# Patient Record
Sex: FEMALE | Race: BLACK OR AFRICAN AMERICAN | NOT HISPANIC OR LATINO | Employment: UNEMPLOYED | URBAN - METROPOLITAN AREA
[De-identification: names, ages, dates, MRNs, and addresses within clinical notes are randomized per-mention and may not be internally consistent; named-entity substitution may affect disease eponyms.]

---

## 2017-01-22 ENCOUNTER — HOSPITAL ENCOUNTER (EMERGENCY)
Facility: HOSPITAL | Age: 37
Discharge: HOME | End: 2017-01-22
Attending: SURGERY
Payer: COMMERCIAL

## 2017-01-22 VITALS
WEIGHT: 195 LBS | DIASTOLIC BLOOD PRESSURE: 79 MMHG | BODY MASS INDEX: 32.45 KG/M2 | SYSTOLIC BLOOD PRESSURE: 132 MMHG | HEART RATE: 76 BPM | RESPIRATION RATE: 16 BRPM | TEMPERATURE: 98 F

## 2017-01-22 DIAGNOSIS — B86 SCABIES: Primary | ICD-10-CM

## 2017-01-22 PROCEDURE — 99283 EMERGENCY DEPT VISIT LOW MDM: CPT

## 2017-01-22 RX ORDER — PERMETHRIN 50 MG/G
CREAM TOPICAL
Qty: 60 G | Refills: 0 | Status: SHIPPED | OUTPATIENT
Start: 2017-01-22 | End: 2017-02-01

## 2017-01-23 NOTE — ED PROVIDER NOTES
"[facility]                                        January 22, 2017                     Chief Complaint  36 y.o. female with Rash (rash to hands)    History of Present Illness  Epifanio Wallace presents to the emergency room with a rash this week  Patient states her daughter brought of a rash after sleeping at friend's house  Patient on exam has scabies bites on the hands, extremities and trunk today  Patient has no signs of cellulitis, no signs of purulent drainage or abscess  Patient states the "rash" itches, motor and son had the exact same "rash"  Patient is afebrile and stable, stating "I think we all have bedbugs"     The history is provided by the patient    Past Medical History   -- Asthma    -- Chronic pelvic pain in female    -- Fibromyalgia    -- GERD (gastroesophageal reflux disease)    -- Osteoarthritis      Past Surgical History   -- Tubal ligation     -- Appendectomy     -- Co 2 laser     -- Pelvic laparoscopy     -- Tonsillectomy     -- Adenoidectomy     -- Colonoscopy        No Known Allergies     Review of Systems and Physical Exam     Review of Systems  -- Constitution - no fever, denies fatigue, no weakness, no chills  -- Eyes - no tearing or redness, no visual disturbance  -- Ear, Nose - no tinnitus or earache, no nasal congestion or discharge  -- Mouth,Throat - no sore throat, no toothache, normal voice, normal swallowing  -- Respiratory - denies cough and congestion, no shortness of breath, no PATEL  -- Cardiovascular - denies chest pain, no palpitations, denies claudication  -- Gastrointestinal - denies abdominal pain, nausea, vomiting, or diarrhea  -- Musculoskeletal - denies back pain, negative for myalgias and arthralgias   -- Neurological - no headache, denies weakness or seizure; no LOC  -- Skin - rash with 2 children, "we have bedbugs"    Vital Signs  -- Her blood pressure is 132/79 and her pulse is 76. Her respiration is 16.      Physical Exam  -- Nursing note and " vitals reviewed  -- Constitutional: Appears well-developed and well-nourished  -- Head: Atraumatic. Normocephalic. No obvious abnormality  -- Eyes: Pupils are equal and reactive to light. Normal conjunctiva and lids  -- Cardiac: Normal rate, regular rhythm and normal heart sounds  -- Pulmonary: Normal respiratory effort, breath sounds clear to auscultation  -- Abdominal: Soft, no tenderness. Normal bowel sounds. Normal liver edge  -- Musculoskeletal: Normal range of motion, no effusions. Joints stable   -- Neurological: No focal deficits. Showed good interaction with staff  -- Vascular: Posterior tibial, dorsalis pedis and radial pulses 2+ bilaterally    -- Lymphatics: No cervical or peripheral lymphadenopathy. No edema noted  -- Skin: Scabies bites on the trunk and extremities    Emergency Room Course     Diagnosis  -- The encounter diagnosis was Scabies.    Disposition and Plan  -- Disposition: home  -- Condition: stable  -- Follow-up: Patient to follow up with Alka Mccullough NP in 1-2 days.  -- I advised the patient that we have found no life threatening condition today  -- At this time, I believe the patient is clinically stable for discharge.   -- The patient acknowledges that close follow up with a MD is required   -- Patient agrees to comply with all instruction and direction given in the ER    This note is dictated on Dragon Natural Speaking word recognition program.  There are word recognition mistakes that are occasionally missed on review.                Antonino Erickson MD  01/22/17 4461

## 2017-01-23 NOTE — DISCHARGE INSTRUCTIONS
"  Scabies  Scabies is an infection of the skin caused by a tiny parasitic insect, or mite, which is too small to see directly. It can be seen under a microscope, but it is usually recognized only by the rash and symptoms it causes. This can make it difficult to diagnose since the signs and symptoms can be similar to other diseases.  The scabies mite tunnels under the skin creating a small burrow, where it deposits its eggs. These then lau and grow into adults. They then create new burrows over the next 1 to 2 weeks. The mites die in about 4 to 6 weeks.  Causes  Scabies is spread by direct skin contact. Casual, very brief contact is usually not enough. For this reason, it is more common in places with crowded living conditions such as households, institutions, schools, and nursing homes. Immunocompromised people are also at increased risk.  Symptoms   It usually takes 2 to 4 weeks to develop symptoms after you have been infected. Often, there are few "classic" signs of scabies, but there are things to look for. Remember, many things can cause the same symptoms, but are not scabies.  · It can start (or spread) anywhere but it most common on the hands, feet, armpits, thighs, abdomen, buttocks, and groin area.  · Itching usually starts in one spot, and then spreads.  · Itching can be worse at night or after a hot bath or shower.  · Redness  · Rash caused by an allergic reaction to the scabies saliva or feces  · Bumps or nodules  · [place], which look like fine lines a half an inch to a few inches long. Most often burrows are seen in the web spaces between the fingers, wrist creases, and hands and are not caused by scratching.  Preventing spread to others  Scabies is highly contagious. It is easily spread by close personal contact or by sharing bed linens or clothing used by an infected person.  It may take 4-6 weeks for symptoms to appear after being exposed. Everyone living in the house with an infected person, as " well as sexual partners of an infected person, should be treated at the same time. After the first treatment, you will no longer be contagious and you may return to work, school or .  Home care  Clothing care  · Machine wash in hot water all sheets, towels, pillowcases, underwear, pajamas and any other clothing recently worn. Use the hot cycle of a dryer or use a hot iron to sterilize.  · Items that are difficult to wash such as coats, jackets, blankets and spreads can be sealed in a plastic trash bag for four days. (The insects die after three days off the human body.)  Medical treatment  Medications used to treat scabies are called scabicides because they kill the scabies mites. Scabicides are only available with a doctor's prescription.  Here are some general instructions for use of these medications:  · Always follow instructions provided by the doctor and pharmacist as well as the printed instructions that come with the medication.  · Use the cream on your body when your skin is cool and dry, not after a hot shower or bath.  · Usually the cream is put on your whole body from the chin all the way down to the toes.  · Leave the cream or lotion on for the recommended amount of time. This is usually 8 to 12 hours.  · Do not leave cream or lotion on the skin longer than directed and do not use more than recommended.  · Clean clothes should be worn after the treatment.  · If you wash your hands after using the cream, you will need to reapply the cream to your hands.  · If you are breast-feeding, wash off your nipples before feeding, and then re-apply the cream after breastfeeding.  · For babies or infants, you can put mittens on their hands to prevent licking the cream or lotion, or scratching themselves because of the itching.  Precautions  · Do not use the medication around your eyes. If it gets in your eyes, wash them out thoroughly.  · Do not use the medication inside the nose, ear, mouth, vagina, the tip  of the penis, or on the eyebrows or eyelashes.  · Pregnant or breastfeeding women and children under 2 years of age should not use the medication until discussing it with your doctor. This won't prevent treatment, but you may be given additional instructions.  The most common causes of failed treatment are:   · Not following the directions correctly  · Not repeating the treatment when necessary  · Not cleaning the house and clothes  · Not having everyone in the house treated  Medications  Itching probably causes the most discomfort. Benadryl (diphenhydramine) is an antihistamine available at drug stores. Use the oral Benedryl, not the cream. Unless a prescription antihistamine was given, Benadryl may be used to reduce itching if large areas of skin are involved. Do not use Benadryl if you have glaucoma or if you are a man with trouble urinating due to an enlarged prostate.  If you were given antibiotics due to a bacterial infection, take them until they are finished. It is important to finish the antibiotics even if the wound looks better to make sure the infection has cleared.  Follow-up care  Follow up with your doctor or as directed if your symptoms do not improve after 1 week, or if new burrows or rashes appear.  When to seek medical care  Get prompt medical attention if any of the following occur:  · Increasing redness of the skin  · Yellow-brown crusts or drainage from the sores  · Other signs of infection, increasing redness, swelling, pain, or pus  · Fever of 100.4°F (38ºC) or higher, or as directed by your health care provider  © 8296-9486 [employer]. [address] [place] [address], [place] [address]. All rights reserved. This information is not intended as a substitute for professional medical care. Always follow your healthcare professional's instructions.

## 2017-04-17 ENCOUNTER — HOSPITAL ENCOUNTER (EMERGENCY)
Facility: HOSPITAL | Age: 37
Discharge: HOME | End: 2017-04-17
Attending: FAMILY MEDICINE
Payer: COMMERCIAL

## 2017-04-17 VITALS
RESPIRATION RATE: 18 BRPM | SYSTOLIC BLOOD PRESSURE: 146 MMHG | DIASTOLIC BLOOD PRESSURE: 73 MMHG | BODY MASS INDEX: 32.12 KG/M2 | WEIGHT: 196 LBS | TEMPERATURE: 99 F | HEART RATE: 89 BPM

## 2017-04-17 DIAGNOSIS — R10.31 CHRONIC GROIN PAIN, RIGHT: Primary | ICD-10-CM

## 2017-04-17 DIAGNOSIS — G89.29 CHRONIC GROIN PAIN, RIGHT: Primary | ICD-10-CM

## 2017-04-17 PROCEDURE — 96372 THER/PROPH/DIAG INJ SC/IM: CPT

## 2017-04-17 PROCEDURE — 63600175 PHARM REV CODE 636 W HCPCS: Performed by: FAMILY MEDICINE

## 2017-04-17 PROCEDURE — 99283 EMERGENCY DEPT VISIT LOW MDM: CPT

## 2017-04-17 RX ORDER — KETOROLAC TROMETHAMINE 30 MG/ML
60 INJECTION, SOLUTION INTRAMUSCULAR; INTRAVENOUS
Status: COMPLETED | OUTPATIENT
Start: 2017-04-17 | End: 2017-04-17

## 2017-04-17 RX ORDER — DICLOFENAC SODIUM 75 MG/1
75 TABLET, DELAYED RELEASE ORAL 2 TIMES DAILY
Qty: 20 TABLET | Refills: 1 | Status: SHIPPED | OUTPATIENT
Start: 2017-04-17 | End: 2017-08-27

## 2017-04-17 RX ADMIN — KETOROLAC TROMETHAMINE 60 MG: 30 INJECTION, SOLUTION INTRAMUSCULAR at 05:04

## 2017-04-17 NOTE — ED PROVIDER NOTES
Encounter Date: 4/17/2017       History     Chief Complaint   Patient presents with    Groin Pain     Review of patient's allergies indicates:  No Known Allergies  Patient is a 36 y.o. female presenting with the following complaint: general illness. The history is provided by the patient. No  was used.   General Illness    The current episode started several weeks ago. The problem has been unchanged. The pain is at a severity of 5/10. Nothing relieves the symptoms. Nothing aggravates the symptoms. Pertinent negatives include no fever, no diarrhea, no nausea, no vomiting, no muscle aches, no cough, no shortness of breath and no rash. She has received no recent medical care.     Patient complains of pain in the right groin and right SI joint off and on for the past 3 years.  She's had workup including lab work and MRI with no diagnosis.  This time it became slightly worse and she came to the ED.  No fever chills nausea or vomiting.  The pain is sharp in nature.  Standing makes it worse any movement makes it worse.    Past Medical History:   Diagnosis Date    Asthma     Chronic pelvic pain in female     Fibromyalgia     GERD (gastroesophageal reflux disease)     Osteoarthritis      Past Surgical History:   Procedure Laterality Date    ADENOIDECTOMY      APPENDECTOMY      laparoscopic    co 2 laser      to cervix about 5 years ago    COLONOSCOPY N/A 12/21/2016    Procedure: COLONOSCOPY;  Surgeon: Aylin García MD;  [facility];  Service: Endoscopy;  Laterality: N/A;    PELVIC LAPAROSCOPY  8/11/14    normal    TONSILLECTOMY      TUBAL LIGATION      UPPER GASTROINTESTINAL ENDOSCOPY       Family History   Problem Relation Age of Onset    Lupus Mother     Hypertension Father      Social History   Substance Use Topics    Smoking status: Never Smoker    Smokeless tobacco: Never Used    Alcohol use 0.0 oz/week     0 Standard drinks or equivalent per week      Comment:  occasionally      Review of Systems   Constitutional: Negative for fever.   Respiratory: Negative for cough and shortness of breath.    Gastrointestinal: Negative for diarrhea, nausea and vomiting.   Skin: Negative for rash.       Physical Exam   Initial Vitals   BP Pulse Resp Temp SpO2   04/17/17 1657 04/17/17 1657 04/17/17 1657 04/17/17 1657 --   146/73 89 18 98.5 °F (36.9 °C)      Physical Exam    Nursing note and vitals reviewed.  Constitutional: She appears well-developed and well-nourished.   HENT:   Head: Normocephalic and atraumatic.   Eyes: Conjunctivae and EOM are normal. Pupils are equal, round, and reactive to light.   Cardiovascular: Normal rate and regular rhythm.   Pulmonary/Chest: Breath sounds normal.   Abdominal: Soft. Bowel sounds are normal.       Musculoskeletal:        Back:    Neurological: She is alert and oriented to person, place, and time.   Psychiatric: She has a normal mood and affect.         ED Course   Procedures  Labs Reviewed - No data to display                            ED Course     Clinical Impression:   The encounter diagnosis was Chronic groin pain, right.          Sreekanth Cain MD  04/17/17 8278

## 2017-04-17 NOTE — ED AVS SNAPSHOT
[facility] [address] [place] [address]               Epifanio Wallace   2017  4:57 PM   ED    Description:  Female : 1980   Department:  [facility]           Your Care was Coordinated By:     Provider Role From To    Sreekanth Cain MD Attending Provider 17 1360 --      Reason for Visit     Groin Pain           Diagnoses this Visit        Comments    Chronic groin pain, right    -  Primary       ED Disposition     ED Disposition Condition Comment    Discharge             To Do List           Follow-up Information     Follow up with Alka Mccullough NP. Schedule an appointment as soon as possible for a visit in 2 days.    Specialty:  Family Medicine    Why:  If symptoms worsen    Contact information:     [address]  [place] [address]  [phone]         These Medications        Disp Refills Start End    diclofenac (VOLTAREN) 75 MG EC tablet 20 tablet 1 2017     Take 1 tablet (75 mg total) by mouth 2 (two) times daily. - Oral    Ph[facility]acy: [facility] [address] Ph #: [phone]         [facility]     [facility] Nurse Care Line -  Assistance  Unless otherwise directed by your provider, please contact [facility], our nurse care line that is available for  assistance.     Registered nurses in the [facility] provide: appointment scheduling, clinical advisement, health education, and other advisory services.  Call: [phone] (toll free)               Medications           Message regarding Medications     Verify the changes and/or additions to your medication regime listed below are the same as discussed with your clinician today.  If any of these changes or additions are incorrect, please notify your healthcare provider.        START taking these NEW medications        Refills    diclofenac (VOLTAREN) 75 MG EC tablet 1    Sig:  Take 1 tablet (75 mg total) by mouth 2 (two) times daily.    Class: Normal    Route: Oral      These medications were administered today        Dose Freq    ketorolac injection 60 mg 60 mg ED 1 Time    Sig: Inject 2 mLs (60 mg total) into the muscle ED 1 Time.    Class: Normal    Route: Intramuscular           Verify that the below list of medications is an accurate representation of the medications you are currently taking.  If none reported, the list may be blank. If incorrect, please contact your healthcare provider. Carry this list with you in case of emergency.           Current Medications     albuterol (ACCUNEB) 0.63 mg/3 mL Nebu Take 3 mLs (0.63 mg total) by nebulization every 6 (six) hours as needed.    albuterol 90 mcg/actuation inhaler Inhale 2 puffs into the lungs every 6 (six) hours as needed for Wheezing.    desonide (DESOWEN) 0.05 % lotion Apply topically 2 (two) times daily.    diclofenac (VOLTAREN) 75 MG EC tablet Take 1 tablet (75 mg total) by mouth 2 (two) times daily.    furosemide (LASIX) 20 MG tablet Take 1 tablet (20 mg total) by mouth daily as needed.    hydrocodone-acetaminophen 5-325mg (NORCO) 5-325 mg per tablet Take 1 tablet by mouth every 4 to 6 hours as needed.    linaclotide 290 mcg Cap Take 1 capsule (290 mcg total) by mouth once daily.    pantoprazole (PROTONIX) 40 MG tablet Take 1 tablet (40 mg total) by mouth once daily.    potassium chloride SA (K-DUR,KLOR-CON) 20 MEQ tablet Take 1 tablet (20 mEq total) by mouth once daily.    promethazine (PHENERGAN) 25 MG tablet Take 1 tablet (25 mg total) by mouth every 6 (six) hours as needed for Nausea.           Clinical Reference Information           Your Vitals Were     BP Pulse Temp Resp Weight Last Period    146/73 89 98.5 °F (36.9 °C) 18 88.9 kg (196 lb) 04/16/2017 (Approximate)    BMI                32.12 kg/m2          Allergies as of 4/17/2017     No Known Allergies      Immunizations Administered on Date of Encounter - 4/17/2017      None      ED Micro, Lab, POCT     None      ED Imaging Orders     None       [facility] complies with applicable Federal civil rights laws and does not discriminate on the basis of race, color, national origin, age, disability, or sex.        Language Assistance Services     ATTENTION: Language assistance services are available, free of charge. Please call [phone].      ATENCIÓN: Si habla español, tiene a gonzalez disposición servicios gratuitos de asistencia lingüística. Llame al [phone].     CHÚ Ý: N?u b?n nói Ti?ng Vi?t, có các d?ch v? h? tr? ngôn ng? mi?n phí dành cho b?n. G?i s? [phone].

## 2017-08-27 ENCOUNTER — HOSPITAL ENCOUNTER (EMERGENCY)
Facility: HOSPITAL | Age: 37
Discharge: HOME | End: 2017-08-27
Attending: EMERGENCY MEDICINE
Payer: COMMERCIAL

## 2017-08-27 VITALS
DIASTOLIC BLOOD PRESSURE: 70 MMHG | SYSTOLIC BLOOD PRESSURE: 135 MMHG | RESPIRATION RATE: 15 BRPM | TEMPERATURE: 98 F | BODY MASS INDEX: 29.83 KG/M2 | WEIGHT: 182 LBS | HEART RATE: 75 BPM | OXYGEN SATURATION: 98 %

## 2017-08-27 DIAGNOSIS — N39.0 URINARY TRACT INFECTION WITHOUT HEMATURIA, SITE UNSPECIFIED: Primary | ICD-10-CM

## 2017-08-27 LAB
B-HCG UR QL: NEGATIVE
BACTERIA #/AREA URNS HPF: ABNORMAL /HPF
BILIRUB UR QL STRIP: NEGATIVE
CLARITY UR: CLEAR
COLOR UR: YELLOW
GLUCOSE UR QL STRIP: NEGATIVE
HGB UR QL STRIP: NEGATIVE
KETONES UR QL STRIP: NEGATIVE
LEUKOCYTE ESTERASE UR QL STRIP: ABNORMAL
MICROSCOPIC COMMENT: ABNORMAL
NITRITE UR QL STRIP: NEGATIVE
PH UR STRIP: 7 [PH] (ref 5–8)
PROT UR QL STRIP: NEGATIVE
RBC #/AREA URNS HPF: 1 /HPF (ref 0–4)
SP GR UR STRIP: 1.02 (ref 1–1.03)
SQUAMOUS #/AREA URNS HPF: 10 /HPF
URN SPEC COLLECT METH UR: ABNORMAL
UROBILINOGEN UR STRIP-ACNC: 1 EU/DL
WBC #/AREA URNS HPF: 20 /HPF (ref 0–5)

## 2017-08-27 PROCEDURE — 81000 URINALYSIS NONAUTO W/SCOPE: CPT

## 2017-08-27 PROCEDURE — 81025 URINE PREGNANCY TEST: CPT

## 2017-08-27 PROCEDURE — 63600175 PHARM REV CODE 636 W HCPCS: Performed by: EMERGENCY MEDICINE

## 2017-08-27 PROCEDURE — 99283 EMERGENCY DEPT VISIT LOW MDM: CPT | Mod: 25

## 2017-08-27 PROCEDURE — 96372 THER/PROPH/DIAG INJ SC/IM: CPT

## 2017-08-27 PROCEDURE — 25000003 PHARM REV CODE 250: Performed by: EMERGENCY MEDICINE

## 2017-08-27 RX ORDER — KETOROLAC TROMETHAMINE 30 MG/ML
60 INJECTION, SOLUTION INTRAMUSCULAR; INTRAVENOUS
Status: COMPLETED | OUTPATIENT
Start: 2017-08-27 | End: 2017-08-27

## 2017-08-27 RX ORDER — KETOROLAC TROMETHAMINE 10 MG/1
10 TABLET, FILM COATED ORAL EVERY 6 HOURS
Qty: 20 TABLET | Refills: 0 | Status: SHIPPED | OUTPATIENT
Start: 2017-08-27 | End: 2017-09-06

## 2017-08-27 RX ORDER — SULFAMETHOXAZOLE AND TRIMETHOPRIM 800; 160 MG/1; MG/1
1 TABLET ORAL 2 TIMES DAILY
Qty: 14 TABLET | Refills: 0 | Status: SHIPPED | OUTPATIENT
Start: 2017-08-27 | End: 2017-09-03

## 2017-08-27 RX ORDER — SULFAMETHOXAZOLE AND TRIMETHOPRIM 800; 160 MG/1; MG/1
1 TABLET ORAL
Status: COMPLETED | OUTPATIENT
Start: 2017-08-27 | End: 2017-08-27

## 2017-08-27 RX ADMIN — SULFAMETHOXAZOLE AND TRIMETHOPRIM 1 TABLET: 800; 160 TABLET ORAL at 09:08

## 2017-08-27 RX ADMIN — KETOROLAC TROMETHAMINE 60 MG: 30 INJECTION, SOLUTION INTRAMUSCULAR at 09:08

## 2017-08-28 NOTE — ED PROVIDER NOTES
[facility]                                                  Chief Complaint  36 y.o. female with Muscle Pain (left sided rib pain x 4 days)    History of Present Illness  Epifanio Wallace presents to the emergency room with several day history of left-sided rib pain.  Patient denies urinary symptoms.  She denies pregnancy.  She denies trauma or fall.  She has no fever or difficulty breathing.      Past Medical History:   Diagnosis Date    Asthma     Chronic pelvic pain in female     Delayed gastric emptying     Fibromyalgia     GERD (gastroesophageal reflux disease)     Osteoarthritis      Past Surgical History:   Procedure Laterality Date    ADENOIDECTOMY      APPENDECTOMY      laparoscopic    co 2 laser      to cervix about 5 years ago    COLONOSCOPY N/A 12/21/2016    Procedure: COLONOSCOPY;  Surgeon: Aylin García MD;  [facility];  Service: Endoscopy;  Laterality: N/A;    PELVIC LAPAROSCOPY  8/11/14    normal    TONSILLECTOMY      TUBAL LIGATION      UPPER GASTROINTESTINAL ENDOSCOPY        Review of patient's allergies indicates:  No Known Allergies     Review of Systems and Physical Exam     Review of Systems  -- Constitution - no fever, denies fatigue, no weakness, no chills  -- Eyes - no tearing or redness, no visual disturbance  -- Ear, Nose - no tinnitus or earache, no nasal congestion or discharge  -- Mouth,Throat - no sore throat, no toothache, normal voice, normal swallowing  -- Respiratory - denies cough and congestion, no shortness of breath, no wheezing  -- Cardiovascular - denies chest pain, no palpitations, denies claudication  -- Gastrointestinal - denies abdominal pain, nausea, vomiting, or diarrhea  -- Genitourinary - no dysuria, no denies flank pain, no hematuria or frequency   -- Musculoskeletal - denies back pain, negative for myalgias and arthralgias  reports left-sided rib pain  -- Neurological - no headache, denies weakness or seizure;  no LOC  -- Skin - denies pallor, rash, or changes in skin. no hives or welts noted    Vital Signs   weight is 82.6 kg (182 lb). Her oral temperature is 98.3 °F (36.8 °C). Her blood pressure is 137/74 and her pulse is 77. Her respiration is 16 and oxygen saturation is 98%.      Physical Exam  -- Nursing note and vitals reviewed  -- Constitutional: Appears well-developed and well-nourished  -- Head: Atraumatic. Normocephalic. No obvious abnormality  -- Eyes: Pupils are equal and reactive to light. Normal conjunctiva and lids  -- Nose: Nose normal in appearance, nares grossly normal. No discharge  -- Throat: Mucous membranes moist, pharynx normal, normal tonsils. No lesions   -- Ears: External ears and TM normal bilaterally. Normal hearing and no drainage  -- Neck: Normal range of motion. Neck supple. No masses, trachea midline  -- Cardiac: Normal rate, regular rhythm and normal heart sounds  -- Pulmonary: Normal respiratory effort, breath sounds clear to auscultation  -- Abdominal: Soft, no tenderness. Normal bowel sounds. Normal liver edge  -- Musculoskeletal: Normal range of motion, no effusions. Joints stable left-sided ribs and soft tissue diffusely tender to palpation over multiple areas  -- Neurological: No focal deficits. Showed good interaction with staff  -- Vascular: Posterior tibial, dorsalis pedis and radial pulses 2+ bilaterally    -- Lymphatics: No cervical or peripheral lymphadenopathy. No edema noted  -- Skin: Warm and dry. No evidence of rash or cellulitis  -- Psychiatric: Normal mood and affect. Bedside behavior is appropriate    Emergency Room Course     Treatment and Evaluation  1.  Physical exam consistent with musculoskeletal pain  2.  Her pregnancy test negative  3.  Urinalysis positive for leukocytes and bacteria  4.  Toradol 60 IM  5.  Bactrim DS one tablet  7.  DC home follow up PCP    Abnormal lab values  Labs Reviewed   URINALYSIS   PREGNANCY TEST, URINE RAPID             Diagnosis  --  UTI    Disposition and Plan  -- Disposition: home  -- Condition: stable  -- Follow-up: Patient to follow up with Alka Mccullough NP in 1-2 days.  -- I advised the patient that we have found no life threatening condition today  -- At this time, I believe the patient is clinically stable for discharge.   -- The patient acknowledges that close follow up with a MD is required   -- Patient agrees to comply with all instruction and direction given in the ER  -- Patient counseled on strict return precautions as discussed       Michelle Sanabria MD  08/27/17 1595

## 2017-09-04 ENCOUNTER — HOSPITAL ENCOUNTER (EMERGENCY)
Facility: HOSPITAL | Age: 37
Discharge: HOME | End: 2017-09-04
Attending: FAMILY MEDICINE
Payer: COMMERCIAL

## 2017-09-04 VITALS
SYSTOLIC BLOOD PRESSURE: 120 MMHG | BODY MASS INDEX: 29.83 KG/M2 | DIASTOLIC BLOOD PRESSURE: 70 MMHG | WEIGHT: 182 LBS | RESPIRATION RATE: 17 BRPM | TEMPERATURE: 98 F | HEART RATE: 80 BPM

## 2017-09-04 DIAGNOSIS — R10.9 LEFT FLANK PAIN: ICD-10-CM

## 2017-09-04 DIAGNOSIS — R10.9 CHRONIC ABDOMINAL PAIN: Primary | ICD-10-CM

## 2017-09-04 DIAGNOSIS — G89.29 CHRONIC ABDOMINAL PAIN: Primary | ICD-10-CM

## 2017-09-04 LAB
ALBUMIN SERPL BCP-MCNC: 3.3 G/DL
ALP SERPL-CCNC: 67 U/L
ALT SERPL W/O P-5'-P-CCNC: 13 U/L
AMORPH CRY URNS QL MICRO: ABNORMAL
ANION GAP SERPL CALC-SCNC: 7 MMOL/L
AST SERPL-CCNC: 12 U/L
B-HCG UR QL: NEGATIVE
BACTERIA #/AREA URNS HPF: ABNORMAL /HPF
BASOPHILS # BLD AUTO: 0.02 K/UL
BASOPHILS NFR BLD: 0.2 %
BILIRUB SERPL-MCNC: 0.3 MG/DL
BILIRUB UR QL STRIP: NEGATIVE
BUN SERPL-MCNC: 10 MG/DL
CALCIUM SERPL-MCNC: 8.8 MG/DL
CHLORIDE SERPL-SCNC: 110 MMOL/L
CLARITY UR: ABNORMAL
CO2 SERPL-SCNC: 24 MMOL/L
COLOR UR: YELLOW
CREAT SERPL-MCNC: 0.7 MG/DL
DIFFERENTIAL METHOD: ABNORMAL
EOSINOPHIL # BLD AUTO: 0.5 K/UL
EOSINOPHIL NFR BLD: 5.7 %
ERYTHROCYTE [DISTWIDTH] IN BLOOD BY AUTOMATED COUNT: 18.2 %
EST. GFR  (AFRICAN AMERICAN): >60 ML/MIN/1.73 M^2
EST. GFR  (NON AFRICAN AMERICAN): >60 ML/MIN/1.73 M^2
GLUCOSE SERPL-MCNC: 93 MG/DL
GLUCOSE UR QL STRIP: NEGATIVE
HCT VFR BLD AUTO: 34.8 %
HGB BLD-MCNC: 11.2 G/DL
HGB UR QL STRIP: ABNORMAL
KETONES UR QL STRIP: NEGATIVE
LEUKOCYTE ESTERASE UR QL STRIP: NEGATIVE
LYMPHOCYTES # BLD AUTO: 2.8 K/UL
LYMPHOCYTES NFR BLD: 34.6 %
MCH RBC QN AUTO: 25.1 PG
MCHC RBC AUTO-ENTMCNC: 32.2 G/DL
MCV RBC AUTO: 78 FL
MICROSCOPIC COMMENT: ABNORMAL
MONOCYTES # BLD AUTO: 0.7 K/UL
MONOCYTES NFR BLD: 8.7 %
NEUTROPHILS # BLD AUTO: 4.1 K/UL
NEUTROPHILS NFR BLD: 50.8 %
NITRITE UR QL STRIP: NEGATIVE
PH UR STRIP: 6 [PH] (ref 5–8)
PLATELET # BLD AUTO: 378 K/UL
PMV BLD AUTO: 10.1 FL
POTASSIUM SERPL-SCNC: 3.6 MMOL/L
PROT SERPL-MCNC: 7.7 G/DL
PROT UR QL STRIP: NEGATIVE
RBC # BLD AUTO: 4.47 M/UL
RBC #/AREA URNS HPF: >100 /HPF (ref 0–4)
SODIUM SERPL-SCNC: 141 MMOL/L
SP GR UR STRIP: 1.01 (ref 1–1.03)
SQUAMOUS #/AREA URNS HPF: 1 /HPF
URN SPEC COLLECT METH UR: ABNORMAL
UROBILINOGEN UR STRIP-ACNC: 1 EU/DL
WBC # BLD AUTO: 8.01 K/UL
WBC #/AREA URNS HPF: 1 /HPF (ref 0–5)

## 2017-09-04 PROCEDURE — 25500020 PHARM REV CODE 255: Performed by: FAMILY MEDICINE

## 2017-09-04 PROCEDURE — 99284 EMERGENCY DEPT VISIT MOD MDM: CPT | Mod: 25

## 2017-09-04 PROCEDURE — 81025 URINE PREGNANCY TEST: CPT

## 2017-09-04 PROCEDURE — 80053 COMPREHEN METABOLIC PANEL: CPT

## 2017-09-04 PROCEDURE — 63600175 PHARM REV CODE 636 W HCPCS: Performed by: FAMILY MEDICINE

## 2017-09-04 PROCEDURE — 96374 THER/PROPH/DIAG INJ IV PUSH: CPT

## 2017-09-04 PROCEDURE — 25000003 PHARM REV CODE 250: Performed by: FAMILY MEDICINE

## 2017-09-04 PROCEDURE — 85025 COMPLETE CBC W/AUTO DIFF WBC: CPT

## 2017-09-04 PROCEDURE — 36415 COLL VENOUS BLD VENIPUNCTURE: CPT

## 2017-09-04 PROCEDURE — 81000 URINALYSIS NONAUTO W/SCOPE: CPT

## 2017-09-04 RX ORDER — DICYCLOMINE HYDROCHLORIDE 20 MG/1
20 TABLET ORAL 4 TIMES DAILY PRN
Qty: 15 TABLET | Refills: 0 | Status: SHIPPED | OUTPATIENT
Start: 2017-09-04 | End: 2017-10-04

## 2017-09-04 RX ORDER — ONDANSETRON 4 MG/1
4 TABLET, ORALLY DISINTEGRATING ORAL EVERY 8 HOURS PRN
Qty: 20 TABLET | Refills: 0 | OUTPATIENT
Start: 2017-09-04 | End: 2018-11-14

## 2017-09-04 RX ORDER — KETOROLAC TROMETHAMINE 30 MG/ML
30 INJECTION, SOLUTION INTRAMUSCULAR; INTRAVENOUS
Status: COMPLETED | OUTPATIENT
Start: 2017-09-04 | End: 2017-09-04

## 2017-09-04 RX ORDER — ONDANSETRON 4 MG/1
4 TABLET, ORALLY DISINTEGRATING ORAL
Status: COMPLETED | OUTPATIENT
Start: 2017-09-04 | End: 2017-09-04

## 2017-09-04 RX ADMIN — ONDANSETRON 4 MG: 4 TABLET, ORALLY DISINTEGRATING ORAL at 05:09

## 2017-09-04 RX ADMIN — IOHEXOL 30 ML: 350 INJECTION, SOLUTION INTRAVENOUS at 03:09

## 2017-09-04 RX ADMIN — KETOROLAC TROMETHAMINE 30 MG: 30 INJECTION, SOLUTION INTRAMUSCULAR at 03:09

## 2017-09-04 RX ADMIN — IOHEXOL 75 ML: 350 INJECTION, SOLUTION INTRAVENOUS at 05:09

## 2017-09-04 NOTE — ED PROVIDER NOTES
Encounter Date: 9/4/2017       History     Chief Complaint   Patient presents with    Flank Pain     The history is provided by the patient. No  was used.   Abdominal Pain   The current episode started several weeks ago. The onset of the illness was gradual. The problem has been gradually worsening. The abdominal pain is located in the left flank. The abdominal pain does not radiate. The severity of the abdominal pain is 7/10. The abdominal pain is relieved by nothing. The abdominal pain is exacerbated by movement and certain positions. The other symptoms of the illness include vomiting and vaginal bleeding. The other symptoms of the illness do not include fever, fatigue, jaundice, melena, nausea, diarrhea, dysuria, hematemesis, hematochezia or vaginal discharge.   Vaginal bleeding was first noticed more than 2 days ago.   The vomiting began today. Vomiting occurs 2 to 5 times per day. The emesis contains stomach contents.   The patient states that she believes she is currently not pregnant. The patient has not had a change in bowel habit. Symptoms associated with the illness do not include chills, anorexia, diaphoresis, heartburn, constipation, urgency, hematuria, frequency or back pain.     Patient reports 3 weeks of intermittent left flank pain.  Pain is burning and sharp in nature.  It is gotten worse in the last 2 or 3 days.  Patient was seen approximately 10 days ago given antibiotics for urinary tract infection which did not improve the symptoms.  No fever or chills.  Nausea but vomiting ×4 today.  No diarrhea.  Only medication she reports is Tylenol or Aleve and Motrin  Patient is a history of chronic pain issues.  Patient is currently having her menstrual cycle at this time.    Review of patient's allergies indicates:  No Known Allergies  Past Medical History:   Diagnosis Date    Asthma     Chronic pelvic pain in female     Delayed gastric emptying     Fibromyalgia     GERD  (gastroesophageal reflux disease)     Osteoarthritis      Past Surgical History:   Procedure Laterality Date    ADENOIDECTOMY      APPENDECTOMY      laparoscopic    co 2 laser      to cervix about 5 years ago    COLONOSCOPY N/A 12/21/2016    Procedure: COLONOSCOPY;  Surgeon: Aylin García MD;  Location: [facility];  Service: Endoscopy;  Laterality: N/A;    PELVIC LAPAROSCOPY  8/11/14    normal    TONSILLECTOMY      TUBAL LIGATION      UPPER GASTROINTESTINAL ENDOSCOPY       Family History   Problem Relation Age of Onset    Lupus Mother     Hypertension Father      Social History   Substance Use Topics    Smoking status: Never Smoker    Smokeless tobacco: Never Used    Alcohol use 0.0 oz/week      Comment: occasionally      Review of Systems   Constitutional: Negative for chills, diaphoresis, fatigue and fever.   Gastrointestinal: Positive for abdominal pain and vomiting. Negative for anorexia, constipation, diarrhea, heartburn, hematemesis, hematochezia, jaundice, melena and nausea.   Genitourinary: Positive for vaginal bleeding. Negative for dysuria, frequency, hematuria, urgency and vaginal discharge.   Musculoskeletal: Negative for back pain.       Physical Exam     Initial Vitals [09/04/17 0241]   BP Pulse Resp Temp SpO2   119/72 84 17 97.6 °F (36.4 °C) --      MAP       87.67         Physical Exam    Nursing note and vitals reviewed.  Constitutional: She appears well-developed and well-nourished.   Overweight female in no acute distress.   HENT:   Head: Normocephalic and atraumatic.   Right Ear: External ear normal.   Left Ear: External ear normal.   Nose: Nose normal.   Mouth/Throat: Oropharynx is clear and moist.   Eyes: Conjunctivae and EOM are normal. Pupils are equal, round, and reactive to light.   Neck: Normal range of motion. Neck supple.   Cardiovascular: Normal rate, regular rhythm and normal heart sounds.   Pulmonary/Chest: Breath sounds normal.   Abdominal: Soft. Bowel sounds are  normal. She exhibits no distension and no mass. There is no hepatosplenomegaly. There is tenderness. There is no rebound, no guarding, no tenderness at McBurney's point and negative Gallegos's sign.       Musculoskeletal: Normal range of motion.   Neurological: She is alert and oriented to person, place, and time. She has normal strength.   Skin: Skin is warm and dry.   Psychiatric: She has a normal mood and affect.         ED Course   Procedures  Labs Reviewed   CBC W/ AUTO DIFFERENTIAL - Abnormal; Notable for the following:        Result Value    Hemoglobin 11.2 (*)     Hematocrit 34.8 (*)     MCV 78 (*)     MCH 25.1 (*)     RDW 18.2 (*)     Platelets 378 (*)     All other components within normal limits   COMPREHENSIVE METABOLIC PANEL - Abnormal; Notable for the following:     Albumin 3.3 (*)     Anion Gap 7 (*)     All other components within normal limits   URINALYSIS - Abnormal; Notable for the following:     Appearance, UA Hazy (*)     Occult Blood UA 2+ (*)     All other components within normal limits   URINALYSIS MICROSCOPIC - Abnormal; Notable for the following:     RBC, UA >100 (*)     All other components within normal limits   PREGNANCY TEST, URINE RAPID                               ED Course      Clinical Impression:   The primary encounter diagnosis was Chronic abdominal pain. A diagnosis of Left flank pain was also pertinent to this visit.          I took over this patient from Dr. Cain at 6 AM shift change  Patient complains of left-sided abdominal pain last couple days  Normal white count and electrolytes, negative pregnancy test noted  Patient had a CT scan with no acute findings noted today in the ER  Review of patient's chart shows chronic abdominal pain issues   Patient has been to gastroenterology at [facility]; other workups  Patient is encouraged to follow up with her PCP this week                   Antonino Erickson MD  09/04/17 7611

## 2017-11-26 ENCOUNTER — HOSPITAL ENCOUNTER (EMERGENCY)
Facility: HOSPITAL | Age: 37
Discharge: HOME | End: 2017-11-26
Attending: SURGERY
Payer: COMMERCIAL

## 2017-11-26 VITALS
DIASTOLIC BLOOD PRESSURE: 92 MMHG | HEART RATE: 76 BPM | RESPIRATION RATE: 18 BRPM | TEMPERATURE: 97 F | SYSTOLIC BLOOD PRESSURE: 148 MMHG | WEIGHT: 193 LBS | BODY MASS INDEX: 31.63 KG/M2

## 2017-11-26 DIAGNOSIS — R10.9 LEFT FLANK PAIN: ICD-10-CM

## 2017-11-26 DIAGNOSIS — R10.9 CHRONIC FLANK PAIN: Primary | ICD-10-CM

## 2017-11-26 DIAGNOSIS — G89.29 CHRONIC FLANK PAIN: Primary | ICD-10-CM

## 2017-11-26 LAB
ALBUMIN SERPL BCP-MCNC: 3.4 G/DL
ALP SERPL-CCNC: 67 U/L
ALT SERPL W/O P-5'-P-CCNC: 12 U/L
AMPHET+METHAMPHET UR QL: NEGATIVE
ANION GAP SERPL CALC-SCNC: 6 MMOL/L
AST SERPL-CCNC: 12 U/L
B-HCG UR QL: NEGATIVE
BACTERIA #/AREA URNS HPF: NORMAL /HPF
BARBITURATES UR QL SCN>200 NG/ML: NEGATIVE
BASOPHILS # BLD AUTO: 0.02 K/UL
BASOPHILS NFR BLD: 0.3 %
BENZODIAZ UR QL SCN>200 NG/ML: NEGATIVE
BILIRUB SERPL-MCNC: 0.3 MG/DL
BILIRUB UR QL STRIP: NEGATIVE
BUN SERPL-MCNC: 8 MG/DL
BZE UR QL SCN: NEGATIVE
CALCIUM SERPL-MCNC: 8.9 MG/DL
CANNABINOIDS UR QL SCN: NEGATIVE
CHLORIDE SERPL-SCNC: 109 MMOL/L
CK MB SERPL-MCNC: 0.8 NG/ML
CK MB SERPL-RTO: 0.4 %
CK SERPL-CCNC: 200 U/L
CK SERPL-CCNC: 200 U/L
CLARITY UR: CLEAR
CO2 SERPL-SCNC: 25 MMOL/L
COLOR UR: YELLOW
CREAT SERPL-MCNC: 0.7 MG/DL
CREAT UR-MCNC: 151.3 MG/DL
DIFFERENTIAL METHOD: ABNORMAL
EOSINOPHIL # BLD AUTO: 0.5 K/UL
EOSINOPHIL NFR BLD: 8.1 %
ERYTHROCYTE [DISTWIDTH] IN BLOOD BY AUTOMATED COUNT: 18.1 %
EST. GFR  (AFRICAN AMERICAN): >60 ML/MIN/1.73 M^2
EST. GFR  (NON AFRICAN AMERICAN): >60 ML/MIN/1.73 M^2
GLUCOSE SERPL-MCNC: 89 MG/DL
GLUCOSE UR QL STRIP: NEGATIVE
HCT VFR BLD AUTO: 33.5 %
HGB BLD-MCNC: 10.5 G/DL
HGB UR QL STRIP: NEGATIVE
KETONES UR QL STRIP: NEGATIVE
LEUKOCYTE ESTERASE UR QL STRIP: ABNORMAL
LIPASE SERPL-CCNC: 21 U/L
LYMPHOCYTES # BLD AUTO: 1.6 K/UL
LYMPHOCYTES NFR BLD: 26.5 %
MCH RBC QN AUTO: 23.9 PG
MCHC RBC AUTO-ENTMCNC: 31.3 G/DL
MCV RBC AUTO: 76 FL
METHADONE UR QL SCN>300 NG/ML: NEGATIVE
MICROSCOPIC COMMENT: NORMAL
MONOCYTES # BLD AUTO: 0.5 K/UL
MONOCYTES NFR BLD: 7.3 %
NEUTROPHILS # BLD AUTO: 3.6 K/UL
NEUTROPHILS NFR BLD: 57.8 %
NITRITE UR QL STRIP: NEGATIVE
OPIATES UR QL SCN: NEGATIVE
PCP UR QL SCN>25 NG/ML: NEGATIVE
PH UR STRIP: 6 [PH] (ref 5–8)
PLATELET # BLD AUTO: 414 K/UL
PMV BLD AUTO: 9.2 FL
POTASSIUM SERPL-SCNC: 3.8 MMOL/L
PROT SERPL-MCNC: 7.5 G/DL
PROT UR QL STRIP: NEGATIVE
RBC # BLD AUTO: 4.4 M/UL
RBC #/AREA URNS HPF: 0 /HPF (ref 0–4)
SODIUM SERPL-SCNC: 140 MMOL/L
SP GR UR STRIP: 1.02 (ref 1–1.03)
TOXICOLOGY INFORMATION: NORMAL
TROPONIN I SERPL DL<=0.01 NG/ML-MCNC: 0.01 NG/ML
URN SPEC COLLECT METH UR: ABNORMAL
UROBILINOGEN UR STRIP-ACNC: NEGATIVE EU/DL
WBC # BLD AUTO: 6.15 K/UL
WBC #/AREA URNS HPF: 2 /HPF (ref 0–5)

## 2017-11-26 PROCEDURE — 80307 DRUG TEST PRSMV CHEM ANLYZR: CPT

## 2017-11-26 PROCEDURE — 82553 CREATINE MB FRACTION: CPT

## 2017-11-26 PROCEDURE — 81025 URINE PREGNANCY TEST: CPT

## 2017-11-26 PROCEDURE — 96372 THER/PROPH/DIAG INJ SC/IM: CPT

## 2017-11-26 PROCEDURE — 63600175 PHARM REV CODE 636 W HCPCS: Performed by: SURGERY

## 2017-11-26 PROCEDURE — 83690 ASSAY OF LIPASE: CPT

## 2017-11-26 PROCEDURE — 85025 COMPLETE CBC W/AUTO DIFF WBC: CPT

## 2017-11-26 PROCEDURE — 80053 COMPREHEN METABOLIC PANEL: CPT

## 2017-11-26 PROCEDURE — 81000 URINALYSIS NONAUTO W/SCOPE: CPT

## 2017-11-26 PROCEDURE — 93005 ELECTROCARDIOGRAM TRACING: CPT

## 2017-11-26 PROCEDURE — 84484 ASSAY OF TROPONIN QUANT: CPT

## 2017-11-26 PROCEDURE — 99284 EMERGENCY DEPT VISIT MOD MDM: CPT | Mod: 25

## 2017-11-26 PROCEDURE — 36415 COLL VENOUS BLD VENIPUNCTURE: CPT

## 2017-11-26 PROCEDURE — 93010 ELECTROCARDIOGRAM REPORT: CPT | Mod: ,,, | Performed by: INTERNAL MEDICINE

## 2017-11-26 RX ORDER — TRAMADOL HYDROCHLORIDE 50 MG/1
50 TABLET ORAL EVERY 6 HOURS PRN
Qty: 20 TABLET | Refills: 0 | Status: SHIPPED | OUTPATIENT
Start: 2017-11-26 | End: 2017-12-06

## 2017-11-26 RX ORDER — ONDANSETRON 2 MG/ML
4 INJECTION INTRAMUSCULAR; INTRAVENOUS
Status: COMPLETED | OUTPATIENT
Start: 2017-11-26 | End: 2017-11-26

## 2017-11-26 RX ORDER — HYDROMORPHONE HYDROCHLORIDE 1 MG/ML
1 INJECTION, SOLUTION INTRAMUSCULAR; INTRAVENOUS; SUBCUTANEOUS
Status: COMPLETED | OUTPATIENT
Start: 2017-11-26 | End: 2017-11-26

## 2017-11-26 RX ADMIN — HYDROMORPHONE HYDROCHLORIDE 1 MG: 1 INJECTION, SOLUTION INTRAMUSCULAR; INTRAVENOUS; SUBCUTANEOUS at 11:11

## 2017-11-26 RX ADMIN — ONDANSETRON 4 MG: 2 INJECTION INTRAMUSCULAR; INTRAVENOUS at 11:11

## 2017-11-26 NOTE — ED NOTES
Patient resting at present.  Updated on plan of care.  Refer to flow sheet or progress note for assessment and vital signs.  Call bell in reach.  Bed locked and in lowest position, side rail up X1.  Patient in no acute distress.  Awaiting additional orders.  Will continue to monitor.

## 2017-11-26 NOTE — ED NOTES
No s/s adverse reaction to medications given.  Discharge instructions/escript instructions given to patient, she voiced understanding.  Discharged to home in stable condition/ambulatory w steady gait out of ER.

## 2017-11-26 NOTE — ED PROVIDER NOTES
[facility]                                     November 26, 2017                   Chief Complaint  37 y.o. female with Flank Pain (left)    History of Present Illness  Epifanio Grubbs presents to the emergency room with left flank pain today  Patient has had ongoing left flank pain and chronic pelvic pain issues for years now  Patient is also mental she has gastroparesis and fibromyalgia, denies dysuria today  Patient on exam has no flank pain or abdominal pain, no suprapubic pain on exam  Patient has a completely benign physical exam, denies any STD risk on interview  The patient has had pelvic pain and left flank pain for years despite several workups    The history is provided by the patient     Past Medical History   -- Asthma     -- Chronic pelvic pain in female     -- Fibromyalgia     -- GERD (gastroesophageal reflux disease)     -- Osteoarthritis        Past Surgical History   -- Tubal ligation       -- Appendectomy       -- Co 2 laser       -- Pelvic laparoscopy       -- Tonsillectomy       -- Adenoidectomy       -- Colonoscopy          No Known Allergies     Review of Systems and Physical Exam     Review of Systems  -- Constitution - no fever, denies fatigue, no weakness, no chills  -- Eyes - no tearing or redness, no visual disturbance  -- Ear, Nose - no tinnitus or earache, no nasal congestion or discharge  -- Mouth,Throat - no sore throat, no toothache, normal voice, normal swallowing  -- Respiratory - denies cough and congestion, no shortness of breath, no PATEL  -- Cardiovascular - denies chest pain, no palpitations, denies claudication  -- Gastrointestinal - denies abdominal pain, nausea, vomiting, or diarrhea  -- Genitourinary - left flank pain, no hematuria or frequency   -- Musculoskeletal - denies back pain, negative for myalgias and arthralgias   -- Neurological - no headache, denies weakness or seizure; no LOC  -- Skin - denies pallor, rash, or changes in skin. no  hives or welts noted    Vital Signs  -- Her oral temperature is 97.3 °F (36.3 °C).   -- Her blood pressure is 148/92 and her pulse is 76.   -- Her respiration is 18.      Physical Exam  -- Nursing note and vitals reviewed  -- Head: Atraumatic. Normocephalic. No obvious abnormality  -- Eyes: Pupils are equal and reactive to light. Normal conjunctiva and lids  -- Cardiac: Normal rate, regular rhythm and normal heart sounds  -- Pulmonary: Normal respiratory effort, breath sounds clear to auscultation  -- Abdominal: Soft, no tenderness. Normal bowel sounds. Normal liver edge  -- Genitourinary: no flank pain on exam, no suprapubic pain by palpation   -- Musculoskeletal: Normal range of motion, no effusions. Joints stable   -- Neurological: No focal deficits. Showed good interaction with staff  -- Vascular: Posterior tibial, dorsalis pedis and radial pulses 2+ bilaterally      Emergency Room Course     Labs  --    -- K 3.8   --    -- CO2 25   -- BUN 8   -- CREATININE 0.7   -- GLU 89   -- ALKPHOS 67   -- AST 12   -- ALT 12   -- BILITOT 0.3   -- ALBUMIN 3.4 (L)   -- PROT 7.5   -- WBC 6.15   -- HGB 10.5 (L)   -- HCT 33.5 (L)   --  (H)   --  (H)   --  (H)   -- CPKMB 0.8   -- TROPONINI 0.006     Urinalysis  -- Urinalysis performed during this ER visit showed no signs of infection      EKG  -- The EKG findings today were without concerning findings from baseline    Radiology  -- The CT stone protocol performed in the ER today was negative for acute pathology    Medications Given  -- IM 1 mg Dilaudid given today in the ER  -- IM 4 mg Zofran given today in the ER      Diagnosis  -- The primary encounter diagnosis was Chronic flank pain.   -- A diagnosis of Left flank pain was also pertinent to this visit.    Disposition and Plan  -- Disposition: home  -- Condition: stable  -- Follow-up: Patient to follow up with Alka Mccullough NP in 1-2 days.  -- I advised the patient that we have found no life  threatening condition today  -- At this time, I believe the patient is clinically stable for discharge.   -- The patient acknowledges that close follow up with a MD is required   -- Patient agrees to comply with all instruction and direction given in the ER    This note is dictated on Dragon Natural Speaking word recognition program.  There are word recognition mistakes that are occasionally missed on review.                  Antonino Erickson MD  11/26/17 6445

## 2017-12-28 ENCOUNTER — HOSPITAL ENCOUNTER (EMERGENCY)
Facility: HOSPITAL | Age: 37
Discharge: HOME | End: 2017-12-28
Attending: SURGERY
Payer: COMMERCIAL

## 2017-12-28 VITALS
SYSTOLIC BLOOD PRESSURE: 141 MMHG | HEART RATE: 77 BPM | DIASTOLIC BLOOD PRESSURE: 91 MMHG | RESPIRATION RATE: 18 BRPM | TEMPERATURE: 98 F

## 2017-12-28 DIAGNOSIS — K08.89 TOOTHACHE: Primary | ICD-10-CM

## 2017-12-28 PROCEDURE — 63600175 PHARM REV CODE 636 W HCPCS: Performed by: NURSE PRACTITIONER

## 2017-12-28 PROCEDURE — 99283 EMERGENCY DEPT VISIT LOW MDM: CPT | Mod: 25

## 2017-12-28 PROCEDURE — 96372 THER/PROPH/DIAG INJ SC/IM: CPT

## 2017-12-28 RX ORDER — AMOXICILLIN 875 MG/1
875 TABLET, FILM COATED ORAL 2 TIMES DAILY
Qty: 14 TABLET | Refills: 0 | Status: SHIPPED | OUTPATIENT
Start: 2017-12-28 | End: 2018-03-05

## 2017-12-28 RX ORDER — KETOROLAC TROMETHAMINE 30 MG/ML
60 INJECTION, SOLUTION INTRAMUSCULAR; INTRAVENOUS
Status: COMPLETED | OUTPATIENT
Start: 2017-12-28 | End: 2017-12-28

## 2017-12-28 RX ORDER — IBUPROFEN 800 MG/1
800 TABLET ORAL EVERY 6 HOURS PRN
Qty: 20 TABLET | Refills: 0 | Status: SHIPPED | OUTPATIENT
Start: 2017-12-28 | End: 2018-01-02

## 2017-12-28 RX ADMIN — KETOROLAC TROMETHAMINE 60 MG: 30 INJECTION, SOLUTION INTRAMUSCULAR at 12:12

## 2017-12-28 NOTE — DISCHARGE INSTRUCTIONS
**Our goal in the emergency department is to always give you outstanding care and exceptional service. You may receive a survey by mail or e-mail in the next week regarding your experience in our ED. We would greatly appreciate your completing and returning the survey. Your feedback provides us with a way to recognize our staff who give very good care and it helps us learn how to improve when your experience was below our aspiration of excellence.     **Return to the ER as needed.

## 2017-12-28 NOTE — ED PROVIDER NOTES
Encounter Date: 12/28/2017       History     Chief Complaint   Patient presents with    Dental Pain     c/o of toothache to left lower area, onset 3 days ago.     The history is provided by the patient.   Dental Pain   Primary symptoms do not include oral lesions, headaches, fever, shortness of breath, sore throat or cough. Primary symptoms comment: toothache to L lower. Illness onset: 1 week ago. The symptoms are worsening. The symptoms are new. The symptoms occur constantly (worse with chewing/eating; rated 8/10).   Additional symptoms include: gum tenderness and jaw pain. Additional symptoms do not include: gum swelling, facial swelling, trouble swallowing, pain with swallowing, drooling, ear pain and fatigue.   Patient reports that she has tried OTC tylenol/motrin with mild relief.     Review of patient's allergies indicates:  No Known Allergies  Past Medical History:   Diagnosis Date    Asthma     Chronic pelvic pain in female     Delayed gastric emptying     Fibromyalgia     GERD (gastroesophageal reflux disease)     Osteoarthritis      Past Surgical History:   Procedure Laterality Date    ADENOIDECTOMY      APPENDECTOMY      laparoscopic    co 2 laser      to cervix about 5 years ago    COLONOSCOPY N/A 12/21/2016    Procedure: COLONOSCOPY;  Surgeon: Aylin García MD;  Location: [facility];  Service: Endoscopy;  Laterality: N/A;    COLPOSCOPY      PELVIC LAPAROSCOPY  8/11/14    normal    TONSILLECTOMY      TUBAL LIGATION      UPPER GASTROINTESTINAL ENDOSCOPY       Family History   Problem Relation Age of Onset    Lupus Mother     Hypertension Father      Social History   Substance Use Topics    Smoking status: Never Smoker    Smokeless tobacco: Never Used    Alcohol use 0.0 oz/week      Comment: occasionally      Review of Systems   Constitutional: Negative for chills, fatigue and fever.   HENT: Positive for dental problem. Negative for congestion, drooling, ear pain, facial swelling,  rhinorrhea, sinus pressure, sore throat and trouble swallowing.    Eyes: Negative for pain, discharge, redness and visual disturbance.   Respiratory: Negative for cough, chest tightness, shortness of breath and wheezing.    Cardiovascular: Negative for chest pain, palpitations and leg swelling.   Gastrointestinal: Negative for abdominal pain, constipation, diarrhea, nausea and vomiting.   Genitourinary: Negative for difficulty urinating, dysuria, flank pain, frequency, hematuria and urgency.   Musculoskeletal: Negative for arthralgias, back pain and myalgias.   Skin: Negative for color change, pallor and rash.   Neurological: Negative for seizures, weakness and headaches.   Psychiatric/Behavioral: Negative.        Physical Exam     Initial Vitals [12/28/17 1218]   BP Pulse Resp Temp SpO2   (!) 141/91 77 18 98.2 °F (36.8 °C) --      MAP       107.67         Physical Exam    Nursing note and vitals reviewed.  HENT:   Head: Normocephalic and atraumatic.   Right Ear: External ear normal.   Left Ear: External ear normal.   Nose: Nose normal.   Mouth/Throat: Oropharynx is clear and moist.   No obvious oral lesion, oral swelling, dental abscess, caries, or cracked tooth.   Eyes: Conjunctivae, EOM and lids are normal. Pupils are equal, round, and reactive to light.   Neck: Normal range of motion. Neck supple.   Cardiovascular: Normal rate, regular rhythm, S1 normal, S2 normal and intact distal pulses.   Pulmonary/Chest: Effort normal and breath sounds normal. No respiratory distress.   Abdominal: Soft. Bowel sounds are normal. She exhibits no distension. There is no tenderness.   Musculoskeletal: Normal range of motion.   Lymphadenopathy:     She has no cervical adenopathy.   Neurological: She is alert and oriented to person, place, and time. She has normal strength.   Skin: Skin is warm and dry. Capillary refill takes less than 2 seconds. No rash noted.   Psychiatric: She has a normal mood and affect. Her speech is normal  and behavior is normal.         ED Course   Procedures  Labs Reviewed - No data to display              Medications   ketorolac injection 60 mg (60 mg Intramuscular Given 12/28/17 5941)                      ED Course      Clinical Impression:   The encounter diagnosis was Toothache.    New Prescriptions    AMOXICILLIN (AMOXIL) 875 MG TABLET    Take 1 tablet (875 mg total) by mouth 2 (two) times daily.    IBUPROFEN (ADVIL,MOTRIN) 800 MG TABLET    Take 1 tablet (800 mg total) by mouth every 6 (six) hours as needed for Pain.         Disposition:   Disposition: Discharged  Condition: Stable  The patient acknowledges that close follow up with medical provider is required. The patient agrees to comply with all instruction and directions given in the ER.                           Bella Herrera NP  12/28/17 8731

## 2018-07-12 ENCOUNTER — HOSPITAL ENCOUNTER (EMERGENCY)
Facility: HOSPITAL | Age: 38
Discharge: HOME | End: 2018-07-12
Attending: SURGERY
Payer: COMMERCIAL

## 2018-07-12 VITALS
OXYGEN SATURATION: 100 % | RESPIRATION RATE: 20 BRPM | SYSTOLIC BLOOD PRESSURE: 124 MMHG | TEMPERATURE: 99 F | DIASTOLIC BLOOD PRESSURE: 79 MMHG | HEART RATE: 80 BPM

## 2018-07-12 DIAGNOSIS — M25.571 RIGHT ANKLE PAIN: ICD-10-CM

## 2018-07-12 PROCEDURE — 96372 THER/PROPH/DIAG INJ SC/IM: CPT | Performed by: SURGERY

## 2018-07-12 PROCEDURE — 63600175 PHARM REV CODE 636 W HCPCS: Performed by: SURGERY

## 2018-07-12 PROCEDURE — 99283 EMERGENCY DEPT VISIT LOW MDM: CPT | Mod: 25 | Performed by: SURGERY

## 2018-07-12 RX ORDER — ONDANSETRON 2 MG/ML
4 INJECTION INTRAMUSCULAR; INTRAVENOUS
Status: COMPLETED | OUTPATIENT
Start: 2018-07-12 | End: 2018-07-12

## 2018-07-12 RX ORDER — KETOROLAC TROMETHAMINE 10 MG/1
10 TABLET, FILM COATED ORAL EVERY 6 HOURS PRN
Qty: 15 TABLET | Refills: 0 | Status: SHIPPED | OUTPATIENT
Start: 2018-07-12 | End: 2018-07-30

## 2018-07-12 RX ORDER — MORPHINE SULFATE 2 MG/ML
2 INJECTION, SOLUTION INTRAMUSCULAR; INTRAVENOUS
Status: COMPLETED | OUTPATIENT
Start: 2018-07-12 | End: 2018-07-12

## 2018-07-12 RX ORDER — CYCLOBENZAPRINE HCL 10 MG
10 TABLET ORAL 3 TIMES DAILY PRN
Qty: 10 TABLET | Refills: 0 | Status: SHIPPED | OUTPATIENT
Start: 2018-07-12 | End: 2018-07-17

## 2018-07-12 RX ADMIN — ONDANSETRON 4 MG: 2 INJECTION INTRAMUSCULAR; INTRAVENOUS at 03:07

## 2018-07-12 RX ADMIN — MORPHINE SULFATE 2 MG: 2 INJECTION, SOLUTION INTRAMUSCULAR; INTRAVENOUS at 03:07

## 2018-07-12 NOTE — ED PROVIDER NOTES
[facility]                                                 Chief Complaint  37 y.o. female with Ankle Pain (right ankle)    History of Present Illness  Epifanio Grubbs presents to the emergency room with right ankle pain  Patient states that she woke up with right ankle pain, denies any trauma or fall  Patient states she has had no history of right ankle pain but has chronic pain  Pt states she has suffers from osteoarthritis and fibromyalgia on a daily basis  Patient has a normal right ankle without bruising swelling tenderness or erythema  Patient has good distal pulses and capillary refill, all x-rays negative for fracture  Patient has no history of gout, no insect bite or complication on ER evaluation    The history is provided by the patient   device was not used during this ER visit    Past Medical History   -- Asthma     -- Chronic pelvic pain in female     -- Fibromyalgia     -- GERD (gastroesophageal reflux disease)     -- Osteoarthritis        Past Surgical History   -- Tubal ligation       -- Appendectomy       -- Co 2 laser       -- Pelvic laparoscopy       -- Tonsillectomy       -- Adenoidectomy       -- Colonoscopy       No Known Allergies     Review of Systems and Physical Exam      Review of Systems  -- Constitution - no fever, denies fatigue, no weakness, no chills  -- Eyes - no tearing or redness, no visual disturbance  -- Ear, Nose - no tinnitus or earache, no nasal congestion or discharge  -- Mouth,Throat - no sore throat, no toothache, normal voice, normal swallowing  -- Respiratory - denies cough and congestion, no shortness of breath, no PATEL  -- Cardiovascular - denies chest pain, no palpitations, denies claudication  -- Gastrointestinal - denies abdominal pain, nausea, vomiting, or diarrhea  -- Musculoskeletal - right ankle pain, negative for myalgias and arthralgias   -- Neurological - no headache, denies weakness or seizure; no LOC  -- Skin  - denies pallor, rash, or changes in skin. no hives or welts noted  -- Psychiatric - Denies SI or HI, no psychosis or fractured thought noted     /79  Pulse 80   Temp 98.5 °F (36.9 °C) (Oral)   Resp 20    Physical Exam  -- Nursing note and vitals reviewed  -- Constitutional: Appears well-developed and well-nourished  -- Head: Atraumatic. Normocephalic. No obvious abnormality  -- Eyes: Pupils are equal and reactive to light. Normal conjunctiva and lids  -- Cardiac: Normal rate, regular rhythm and normal heart sounds  -- Pulmonary: Normal respiratory effort, breath sounds clear to auscultation  -- Abdominal: Soft, no tenderness. Normal bowel sounds. Normal liver edge  -- Musculoskeletal: Normal range of motion, no effusions. Joints stable   -- Neurological: No focal deficits. Showed good interaction with staff  -- Vascular: Posterior tibial, dorsalis pedis and radial pulses 2+ bilaterally      Emergency Room Course      Treatment and Evaluation  -- Preliminary ER x-ray readings showed no evidence of fracture or dislocation  -- All x-rays are reviewed with a final disposition given by the radiologist  -- An ankle ace wrap is placed on the affected ankle by the CNA  -- Crutches were also given and taught for ambulation     -- IM 2 mg Morphine given in the ER  -- IM 4 mg Zofran given today in the ER       Diagnosis  -- The encounter diagnosis was Right ankle pain.    Disposition and Plan  -- Disposition: home  -- Condition: stable  -- Follow-up: Patient to follow up with Alka Mccullough NP in 1-2 days.  -- I advised the patient that we have found no life threatening condition today  -- At this time, I believe the patient is clinically stable for discharge.   -- The patient acknowledges that close follow up with a MD is required   -- Patient agrees to comply with all instruction and direction given in the ER    This note is dictated on Dragon Natural Speaking word recognition program.  There are word recognition  mistakes that are occasionally missed on review.          Antonino Erickson MD  07/12/18 6633

## 2018-11-12 PROBLEM — D50.9 IRON DEFICIENCY ANEMIA: Status: ACTIVE | Noted: 2018-11-12

## 2018-11-14 ENCOUNTER — HOSPITAL ENCOUNTER (EMERGENCY)
Facility: HOSPITAL | Age: 38
Discharge: HOME | End: 2018-11-14
Attending: SURGERY
Payer: COMMERCIAL

## 2018-11-14 VITALS
SYSTOLIC BLOOD PRESSURE: 130 MMHG | DIASTOLIC BLOOD PRESSURE: 77 MMHG | TEMPERATURE: 97 F | BODY MASS INDEX: 30.16 KG/M2 | HEART RATE: 71 BPM | HEIGHT: 65 IN | RESPIRATION RATE: 16 BRPM | OXYGEN SATURATION: 100 % | WEIGHT: 181 LBS

## 2018-11-14 DIAGNOSIS — R10.9 FLANK PAIN: ICD-10-CM

## 2018-11-14 DIAGNOSIS — R10.9 LEFT FLANK PAIN: ICD-10-CM

## 2018-11-14 DIAGNOSIS — F45.42 PAIN DISORDER ASSOCIATED WITH PSYCHOLOGICAL AND PHYSICAL FACTORS: Primary | ICD-10-CM

## 2018-11-14 LAB
ALBUMIN SERPL BCP-MCNC: 3.2 G/DL
ALP SERPL-CCNC: 58 U/L
ALT SERPL W/O P-5'-P-CCNC: 23 U/L
AMPHET+METHAMPHET UR QL: NEGATIVE
ANION GAP SERPL CALC-SCNC: 6 MMOL/L
AST SERPL-CCNC: 16 U/L
B-HCG UR QL: NEGATIVE
BARBITURATES UR QL SCN>200 NG/ML: NEGATIVE
BASOPHILS # BLD AUTO: 0.02 K/UL
BASOPHILS NFR BLD: 0.3 %
BENZODIAZ UR QL SCN>200 NG/ML: NEGATIVE
BILIRUB SERPL-MCNC: 0.3 MG/DL
BILIRUB UR QL STRIP: NEGATIVE
BUN SERPL-MCNC: 7 MG/DL
BZE UR QL SCN: NEGATIVE
CALCIUM SERPL-MCNC: 8.8 MG/DL
CANNABINOIDS UR QL SCN: NEGATIVE
CHLORIDE SERPL-SCNC: 110 MMOL/L
CK MB SERPL-MCNC: 0.6 NG/ML
CK MB SERPL-RTO: 1.8 %
CK SERPL-CCNC: 34 U/L
CK SERPL-CCNC: 34 U/L
CLARITY UR: CLEAR
CO2 SERPL-SCNC: 25 MMOL/L
COLOR UR: YELLOW
CREAT SERPL-MCNC: 0.7 MG/DL
CREAT UR-MCNC: 48.6 MG/DL
DIFFERENTIAL METHOD: ABNORMAL
EOSINOPHIL # BLD AUTO: 0.2 K/UL
EOSINOPHIL NFR BLD: 2.1 %
ERYTHROCYTE [DISTWIDTH] IN BLOOD BY AUTOMATED COUNT: 18.9 %
EST. GFR  (AFRICAN AMERICAN): >60 ML/MIN/1.73 M^2
EST. GFR  (NON AFRICAN AMERICAN): >60 ML/MIN/1.73 M^2
GLUCOSE SERPL-MCNC: 83 MG/DL
GLUCOSE UR QL STRIP: NEGATIVE
HCT VFR BLD AUTO: 33 %
HGB BLD-MCNC: 10.5 G/DL
HGB UR QL STRIP: NEGATIVE
KETONES UR QL STRIP: NEGATIVE
LEUKOCYTE ESTERASE UR QL STRIP: NEGATIVE
LIPASE SERPL-CCNC: 17 U/L
LYMPHOCYTES # BLD AUTO: 1.8 K/UL
LYMPHOCYTES NFR BLD: 24.9 %
MCH RBC QN AUTO: 24.5 PG
MCHC RBC AUTO-ENTMCNC: 31.8 G/DL
MCV RBC AUTO: 77 FL
METHADONE UR QL SCN>300 NG/ML: NEGATIVE
MONOCYTES # BLD AUTO: 0.5 K/UL
MONOCYTES NFR BLD: 7.1 %
NEUTROPHILS # BLD AUTO: 4.7 K/UL
NEUTROPHILS NFR BLD: 65.6 %
NITRITE UR QL STRIP: NEGATIVE
OPIATES UR QL SCN: NEGATIVE
PCP UR QL SCN>25 NG/ML: NEGATIVE
PH UR STRIP: 6 [PH] (ref 5–8)
PLATELET # BLD AUTO: 406 K/UL
PMV BLD AUTO: 9.1 FL
POTASSIUM SERPL-SCNC: 4.1 MMOL/L
PROT SERPL-MCNC: 7 G/DL
PROT UR QL STRIP: NEGATIVE
RBC # BLD AUTO: 4.28 M/UL
SODIUM SERPL-SCNC: 141 MMOL/L
SP GR UR STRIP: 1.01 (ref 1–1.03)
TOXICOLOGY INFORMATION: NORMAL
TROPONIN I SERPL DL<=0.01 NG/ML-MCNC: <0.006 NG/ML
URN SPEC COLLECT METH UR: NORMAL
UROBILINOGEN UR STRIP-ACNC: NEGATIVE EU/DL
WBC # BLD AUTO: 7.22 K/UL

## 2018-11-14 PROCEDURE — 81025 URINE PREGNANCY TEST: CPT

## 2018-11-14 PROCEDURE — 80053 COMPREHEN METABOLIC PANEL: CPT

## 2018-11-14 PROCEDURE — 82550 ASSAY OF CK (CPK): CPT

## 2018-11-14 PROCEDURE — 85025 COMPLETE CBC W/AUTO DIFF WBC: CPT

## 2018-11-14 PROCEDURE — 99284 EMERGENCY DEPT VISIT MOD MDM: CPT | Mod: 25

## 2018-11-14 PROCEDURE — 93010 ELECTROCARDIOGRAM REPORT: CPT | Mod: ,,, | Performed by: INTERNAL MEDICINE

## 2018-11-14 PROCEDURE — 82553 CREATINE MB FRACTION: CPT

## 2018-11-14 PROCEDURE — 96374 THER/PROPH/DIAG INJ IV PUSH: CPT

## 2018-11-14 PROCEDURE — 81003 URINALYSIS AUTO W/O SCOPE: CPT | Mod: 59

## 2018-11-14 PROCEDURE — 83690 ASSAY OF LIPASE: CPT

## 2018-11-14 PROCEDURE — 36415 COLL VENOUS BLD VENIPUNCTURE: CPT

## 2018-11-14 PROCEDURE — 96375 TX/PRO/DX INJ NEW DRUG ADDON: CPT

## 2018-11-14 PROCEDURE — 96376 TX/PRO/DX INJ SAME DRUG ADON: CPT

## 2018-11-14 PROCEDURE — 80307 DRUG TEST PRSMV CHEM ANLYZR: CPT

## 2018-11-14 PROCEDURE — 84484 ASSAY OF TROPONIN QUANT: CPT

## 2018-11-14 PROCEDURE — 63600175 PHARM REV CODE 636 W HCPCS: Performed by: SURGERY

## 2018-11-14 PROCEDURE — 93005 ELECTROCARDIOGRAM TRACING: CPT

## 2018-11-14 RX ORDER — MORPHINE SULFATE 2 MG/ML
2 INJECTION, SOLUTION INTRAMUSCULAR; INTRAVENOUS
Status: COMPLETED | OUTPATIENT
Start: 2018-11-14 | End: 2018-11-14

## 2018-11-14 RX ORDER — FAMOTIDINE 20 MG/1
20 TABLET, FILM COATED ORAL 2 TIMES DAILY
Qty: 20 TABLET | Refills: 0 | Status: SHIPPED | OUTPATIENT
Start: 2018-11-14 | End: 2019-01-07 | Stop reason: ALTCHOICE

## 2018-11-14 RX ORDER — IBUPROFEN 800 MG/1
800 TABLET ORAL EVERY 6 HOURS PRN
Qty: 20 TABLET | Refills: 0 | OUTPATIENT
Start: 2018-11-14 | End: 2020-10-11

## 2018-11-14 RX ORDER — ONDANSETRON 2 MG/ML
4 INJECTION INTRAMUSCULAR; INTRAVENOUS
Status: COMPLETED | OUTPATIENT
Start: 2018-11-14 | End: 2018-11-14

## 2018-11-14 RX ORDER — ONDANSETRON 4 MG/1
4 TABLET, ORALLY DISINTEGRATING ORAL EVERY 8 HOURS PRN
Qty: 20 TABLET | Refills: 0 | Status: SHIPPED | OUTPATIENT
Start: 2018-11-14 | End: 2019-05-13

## 2018-11-14 RX ADMIN — MORPHINE SULFATE 2 MG: 2 INJECTION, SOLUTION INTRAMUSCULAR; INTRAVENOUS at 11:11

## 2018-11-14 RX ADMIN — MORPHINE SULFATE 2 MG: 2 INJECTION, SOLUTION INTRAMUSCULAR; INTRAVENOUS at 08:11

## 2018-11-14 RX ADMIN — ONDANSETRON 4 MG: 2 INJECTION INTRAMUSCULAR; INTRAVENOUS at 08:11

## 2018-11-14 NOTE — ED NOTES
The patient is awake, alert and cooperative with a calm affect, patient is aware of environment, airway is open and patent, respirations are spontaneous, normal effort and rate noted, skin warm and dry, moves all extremities well, appearance: no apparent distress noted, bed placed in low position, side rails up x 2, call light is within reach of patient or family, explanation of care provided to pt,  plan of care: observe and reassure, position of comfort, patient offers no complaints at this time, awaiting results, will continue to monitor.

## 2018-11-14 NOTE — ED PROVIDER NOTES
[facility]                                                 Chief Complaint  38 y.o. female with Abdominal Pain    History of Present Illness  Epifanio Grubbs presents to the emergency room with left flank pain  Patient has had chronic left flank pain since 2013, no known diagnosis noted  Patient states she has chronic left flank pain and left lower quadrant pain monthly  Patient states that this is not related to menstrual cycle, history of appendectomy  History of tubal ligation and has had pelvic laparoscopy for possible diagnosis  Patient has a no flank pain on exam and a soft abdomen, afebrile and stable     The history is provided by the patient   device was not used during this ER visit    Past Medical History   -- Asthma     -- Chronic pelvic pain in female     -- Fibromyalgia     -- GERD (gastroesophageal reflux disease)     -- Osteoarthritis        Past Surgical History   -- Tubal ligation       -- Appendectomy       -- Co 2 laser       -- Pelvic laparoscopy       -- Tonsillectomy       -- Adenoidectomy       -- Colonoscopy          No Known Allergies     Review of Systems and Physical Exam      Review of Systems  -- Constitution - no fever, denies fatigue, no weakness, no chills  -- Eyes - no tearing or redness, no visual disturbance  -- Ear, Nose - no tinnitus or earache, no nasal congestion or discharge  -- Mouth,Throat - no sore throat, no toothache, normal voice, normal swallowing  -- Respiratory - denies cough and congestion, no shortness of breath, no PATEL  -- Cardiovascular - denies chest pain, no palpitations, denies claudication  -- Gastrointestinal - denies abdominal pain, nausea, vomiting, or diarrhea  -- Genitourinary - left flank pain, no hematuria, no STD risk  -- Musculoskeletal - denies back pain, negative for myalgias and arthralgias   -- Neurological - no headache, denies weakness or seizure; no LOC  -- Skin - denies pallor, rash, or  changes in skin. no hives or welts noted  -- Psychiatric - Denies SI or HI, no psychosis or fractured thought noted     Vital Signs  Her oral temperature is 96.9 °F (36.1 °C).   Her blood pressure is 130/77 and her pulse is 71.   Her respiration is 16 and oxygen saturation is 100%.     Physical Exam  -- Nursing note and vitals reviewed  -- Constitutional: Appears well-developed and well-nourished  -- Head: Atraumatic. Normocephalic. No obvious abnormality  -- Eyes: Pupils are equal and reactive to light. Normal conjunctiva and lids  -- Cardiac: Normal rate, regular rhythm and normal heart sounds  -- Pulmonary: Normal respiratory effort, breath sounds clear to auscultation  -- Abdominal: Soft, no tenderness. Normal bowel sounds. Normal liver edge  -- Genitourinary: no flank pain on exam, no suprapubic pain by palpation   -- Musculoskeletal: Normal range of motion, no effusions. Joints stable   -- Neurological: No focal deficits. Showed good interaction with staff  -- Vascular: Posterior tibial, dorsalis pedis and radial pulses 2+ bilaterally      Emergency Room Course      Lab Results     K 4.1      CO2 25   BUN 7   CREATININE 0.7   GLU 83   ALKPHOS 58   AST 16   ALT 23   BILITOT 0.3   ALBUMIN 3.2 (L)   PROT 7.0   WBC 7.22   HGB 10.5 (L)   HCT 33.0 (L)    (H)   CPK 34   CPK 34   CPKMB 0.6   TROPONINI <0.006   MG 2.0     Urinalysis  -- Urinalysis performed during this ER visit showed no signs of infection    -- The urine pregnancy test today was negative; patient informed of the results     US GYN  Slightly thickened endometrial stripe with echogenic focus in the endometrial cavity in the upper uterine segment measuring 1.3 cm, possibly a polyp or blood clot.  GYN follow-up is recommended.   3 cm right adnexal cyst.     Medications Given  ondansetron injection 4 mg (4 mg Intramuscular Given 11/14/18 0817)   morphine injection 2 mg (2 mg Intramuscular Given 11/14/18 0817)   morphine injection 2 mg (2  mg Intramuscular Given 11/14/18 7690)     Diagnosis  -- Pain disorder associated with psychological and physical factors  -- Left flank pain and Flank pain were also pertinent to this visit.    Disposition and Plan  -- Disposition: home  -- Condition: stable  -- Follow-up: Patient to follow up with Alka Mccullough NP in 1-2 days.  -- I advised the patient that we have found no life threatening condition today  -- At this time, I believe the patient is clinically stable for discharge.   -- The patient acknowledges that close follow up with a MD is required   -- Patient agrees to comply with all instruction and direction given in the ER    This note is dictated on Dragon Natural Speaking word recognition program.  There are word recognition mistakes that are occasionally missed on review.         Antonino Erickson MD  11/14/18 4765

## 2018-11-14 NOTE — ED NOTES
Hourly Rounding complete. Pt resting quietly in room respirations even and unlabored CÉSAR pt has no needs or complaints at this time bed locked and in lowest position call bell in reach pt instructed to call for needs

## 2018-11-14 NOTE — ED NOTES
The patient was seen, evaluated and discharged by Dr Erickson. All questions were asked and/or answered and the pt was discharged with written and verbal instructions.  Discharged to home/self care.    - Condition at discharge: Good  - Mode of Discharge: Ambulatory  - The patient left the ED accompanied by a family member.  - The discharge instructions were discussed with the patient.Pt instructed to avoid driving today because she received Morphine for pain, family at bedside  - They state an understanding of the discharge instructions.  - Walked pt to the discharge station.

## 2018-11-20 PROBLEM — M54.17 LUMBOSACRAL RADICULOPATHY: Status: ACTIVE | Noted: 2018-11-20

## 2018-11-20 PROBLEM — M51.26 PROTRUDED LUMBAR DISC: Status: ACTIVE | Noted: 2018-11-20

## 2018-12-27 ENCOUNTER — HOSPITAL ENCOUNTER (EMERGENCY)
Facility: HOSPITAL | Age: 38
Discharge: HOME | End: 2018-12-27
Attending: SURGERY
Payer: COMMERCIAL

## 2018-12-27 VITALS
HEART RATE: 75 BPM | WEIGHT: 185 LBS | OXYGEN SATURATION: 98 % | SYSTOLIC BLOOD PRESSURE: 164 MMHG | TEMPERATURE: 97 F | BODY MASS INDEX: 30.79 KG/M2 | RESPIRATION RATE: 20 BRPM | DIASTOLIC BLOOD PRESSURE: 89 MMHG

## 2018-12-27 DIAGNOSIS — G43.911 INTRACTABLE MIGRAINE WITH STATUS MIGRAINOSUS, UNSPECIFIED MIGRAINE TYPE: Primary | ICD-10-CM

## 2018-12-27 PROCEDURE — 96372 THER/PROPH/DIAG INJ SC/IM: CPT

## 2018-12-27 PROCEDURE — 63600175 PHARM REV CODE 636 W HCPCS: Performed by: NURSE PRACTITIONER

## 2018-12-27 PROCEDURE — 99284 EMERGENCY DEPT VISIT MOD MDM: CPT | Mod: 25

## 2018-12-27 PROCEDURE — 63600175 PHARM REV CODE 636 W HCPCS: Performed by: SURGERY

## 2018-12-27 RX ORDER — KETOROLAC TROMETHAMINE 30 MG/ML
60 INJECTION, SOLUTION INTRAMUSCULAR; INTRAVENOUS
Status: COMPLETED | OUTPATIENT
Start: 2018-12-27 | End: 2018-12-27

## 2018-12-27 RX ORDER — MORPHINE SULFATE 4 MG/ML
4 INJECTION, SOLUTION INTRAMUSCULAR; INTRAVENOUS
Status: COMPLETED | OUTPATIENT
Start: 2018-12-27 | End: 2018-12-27

## 2018-12-27 RX ORDER — BUTALBITAL, ACETAMINOPHEN AND CAFFEINE 50; 325; 40 MG/1; MG/1; MG/1
1 TABLET ORAL EVERY 4 HOURS PRN
Qty: 15 TABLET | Refills: 0 | Status: SHIPPED | OUTPATIENT
Start: 2018-12-27 | End: 2019-01-26

## 2018-12-27 RX ORDER — ONDANSETRON 2 MG/ML
4 INJECTION INTRAMUSCULAR; INTRAVENOUS
Status: COMPLETED | OUTPATIENT
Start: 2018-12-27 | End: 2018-12-27

## 2018-12-27 RX ADMIN — MORPHINE SULFATE 4 MG: 4 INJECTION, SOLUTION INTRAMUSCULAR; INTRAVENOUS at 07:12

## 2018-12-27 RX ADMIN — KETOROLAC TROMETHAMINE 60 MG: 30 INJECTION, SOLUTION INTRAMUSCULAR; INTRAVENOUS at 07:12

## 2018-12-27 RX ADMIN — ONDANSETRON 4 MG: 2 INJECTION INTRAMUSCULAR; INTRAVENOUS at 07:12

## 2018-12-28 NOTE — ED NOTES
Patient provided D/C instructions at the bedside.  Patient was provided the opportunity to review D/C summary and diagnosis.  Patient has no further questions or concerns in regards to treatment/care they have received today in the emergency department.  Patient acknowledged discharge teachings and follow-up appointment as directed.  Patient had steady gait while exiting the emergency department.

## 2018-12-28 NOTE — DISCHARGE INSTRUCTIONS
**Follow up with PCP in 24-48 hours. Return to ER with worsening of symptoms.     **Our goal in the emergency department is to always give you outstanding care and exceptional service. You may receive a survey by mail or e-mail in the next week regarding your experience in our ED. We would greatly appreciate your completing and returning the survey. Your feedback provides us with a way to recognize our staff who give very good care and it helps us learn how to improve when your experience was below our aspiration of excellence.

## 2018-12-28 NOTE — ED TRIAGE NOTES
38 y.o. female presents to ER   Chief Complaint   Patient presents with    Headache   On and off headache with nausea since 12/25, unrelieved by motrin or tylenol. No acute distress noted.

## 2019-04-02 ENCOUNTER — HOSPITAL ENCOUNTER (EMERGENCY)
Facility: HOSPITAL | Age: 39
Discharge: HOME | End: 2019-04-03
Attending: EMERGENCY MEDICINE
Payer: COMMERCIAL

## 2019-04-02 DIAGNOSIS — Z79.899: Primary | ICD-10-CM

## 2019-04-02 DIAGNOSIS — R52 GENERALIZED BODY ACHES: ICD-10-CM

## 2019-04-02 LAB
B-HCG UR QL: NEGATIVE
BASOPHILS # BLD AUTO: 0.01 K/UL (ref 0–0.2)
BASOPHILS NFR BLD: 0.2 % (ref 0–1.9)
BILIRUB UR QL STRIP: NEGATIVE
CLARITY UR: CLEAR
COLOR UR: YELLOW
DIFFERENTIAL METHOD: ABNORMAL
EOSINOPHIL # BLD AUTO: 0.1 K/UL (ref 0–0.5)
EOSINOPHIL NFR BLD: 0.9 % (ref 0–8)
ERYTHROCYTE [DISTWIDTH] IN BLOOD BY AUTOMATED COUNT: 14.4 % (ref 11.5–14.5)
GLUCOSE UR QL STRIP: NEGATIVE
HCT VFR BLD AUTO: 38.3 % (ref 37–48.5)
HGB BLD-MCNC: 12.7 G/DL (ref 12–16)
HGB UR QL STRIP: NEGATIVE
INFLUENZA A, MOLECULAR: NEGATIVE
INFLUENZA B, MOLECULAR: NEGATIVE
KETONES UR QL STRIP: NEGATIVE
LEUKOCYTE ESTERASE UR QL STRIP: NEGATIVE
LYMPHOCYTES # BLD AUTO: 1 K/UL (ref 1–4.8)
LYMPHOCYTES NFR BLD: 17.5 % (ref 18–48)
MCH RBC QN AUTO: 29.7 PG (ref 27–31)
MCHC RBC AUTO-ENTMCNC: 33.2 G/DL (ref 32–36)
MCV RBC AUTO: 90 FL (ref 82–98)
MONOCYTES # BLD AUTO: 0.3 K/UL (ref 0.3–1)
MONOCYTES NFR BLD: 5.9 % (ref 4–15)
NEUTROPHILS # BLD AUTO: 4.1 K/UL (ref 1.8–7.7)
NEUTROPHILS NFR BLD: 75.5 % (ref 38–73)
NITRITE UR QL STRIP: NEGATIVE
PH UR STRIP: 7 [PH] (ref 5–8)
PLATELET # BLD AUTO: 223 K/UL (ref 150–350)
PMV BLD AUTO: 9.8 FL (ref 9.2–12.9)
PROT UR QL STRIP: ABNORMAL
RBC # BLD AUTO: 4.28 M/UL (ref 4–5.4)
SP GR UR STRIP: 1.02 (ref 1–1.03)
SPECIMEN SOURCE: NORMAL
URN SPEC COLLECT METH UR: ABNORMAL
UROBILINOGEN UR STRIP-ACNC: ABNORMAL EU/DL
WBC # BLD AUTO: 5.44 K/UL (ref 3.9–12.7)

## 2019-04-02 PROCEDURE — 85025 COMPLETE CBC W/AUTO DIFF WBC: CPT

## 2019-04-02 PROCEDURE — 25000003 PHARM REV CODE 250: Performed by: EMERGENCY MEDICINE

## 2019-04-02 PROCEDURE — 80053 COMPREHEN METABOLIC PANEL: CPT

## 2019-04-02 PROCEDURE — 81025 URINE PREGNANCY TEST: CPT

## 2019-04-02 PROCEDURE — 36415 COLL VENOUS BLD VENIPUNCTURE: CPT

## 2019-04-02 PROCEDURE — 81003 URINALYSIS AUTO W/O SCOPE: CPT

## 2019-04-02 PROCEDURE — 99284 EMERGENCY DEPT VISIT MOD MDM: CPT | Mod: 25

## 2019-04-02 PROCEDURE — 87502 INFLUENZA DNA AMP PROBE: CPT

## 2019-04-02 PROCEDURE — 96372 THER/PROPH/DIAG INJ SC/IM: CPT

## 2019-04-02 RX ORDER — IBUPROFEN 600 MG/1
600 TABLET ORAL
Status: COMPLETED | OUTPATIENT
Start: 2019-04-03 | End: 2019-04-03

## 2019-04-02 RX ORDER — IBUPROFEN 600 MG/1
600 TABLET ORAL
Status: DISCONTINUED | OUTPATIENT
Start: 2019-04-02 | End: 2019-04-02

## 2019-04-02 RX ORDER — ACETAMINOPHEN 500 MG
1000 TABLET ORAL
Status: COMPLETED | OUTPATIENT
Start: 2019-04-02 | End: 2019-04-02

## 2019-04-02 RX ADMIN — ACETAMINOPHEN 1000 MG: 500 TABLET, FILM COATED ORAL at 10:04

## 2019-04-03 VITALS
HEART RATE: 74 BPM | TEMPERATURE: 99 F | WEIGHT: 189.63 LBS | RESPIRATION RATE: 20 BRPM | BODY MASS INDEX: 31.55 KG/M2 | DIASTOLIC BLOOD PRESSURE: 61 MMHG | OXYGEN SATURATION: 99 % | SYSTOLIC BLOOD PRESSURE: 113 MMHG

## 2019-04-03 LAB
ALBUMIN SERPL BCP-MCNC: 3.3 G/DL (ref 3.5–5.2)
ALP SERPL-CCNC: 67 U/L (ref 55–135)
ALT SERPL W/O P-5'-P-CCNC: 13 U/L (ref 10–44)
ANION GAP SERPL CALC-SCNC: 7 MMOL/L (ref 8–16)
AST SERPL-CCNC: 10 U/L (ref 10–40)
BILIRUB SERPL-MCNC: 0.4 MG/DL (ref 0.1–1)
BUN SERPL-MCNC: 7 MG/DL (ref 6–20)
CALCIUM SERPL-MCNC: 8.7 MG/DL (ref 8.7–10.5)
CHLORIDE SERPL-SCNC: 112 MMOL/L (ref 95–110)
CO2 SERPL-SCNC: 19 MMOL/L (ref 23–29)
CREAT SERPL-MCNC: 0.7 MG/DL (ref 0.5–1.4)
EST. GFR  (AFRICAN AMERICAN): >60 ML/MIN/1.73 M^2
EST. GFR  (NON AFRICAN AMERICAN): >60 ML/MIN/1.73 M^2
GLUCOSE SERPL-MCNC: 103 MG/DL (ref 70–110)
POTASSIUM SERPL-SCNC: 3.4 MMOL/L (ref 3.5–5.1)
PROT SERPL-MCNC: 6.7 G/DL (ref 6–8.4)
SODIUM SERPL-SCNC: 138 MMOL/L (ref 136–145)

## 2019-04-03 PROCEDURE — 63600175 PHARM REV CODE 636 W HCPCS: Performed by: EMERGENCY MEDICINE

## 2019-04-03 PROCEDURE — 25000003 PHARM REV CODE 250: Performed by: EMERGENCY MEDICINE

## 2019-04-03 RX ORDER — CEFTRIAXONE 1 G/1
1 INJECTION, POWDER, FOR SOLUTION INTRAMUSCULAR; INTRAVENOUS
Status: COMPLETED | OUTPATIENT
Start: 2019-04-03 | End: 2019-04-03

## 2019-04-03 RX ORDER — CYCLOBENZAPRINE HCL 10 MG
10 TABLET ORAL 3 TIMES DAILY PRN
Qty: 15 TABLET | Refills: 0 | Status: SHIPPED | OUTPATIENT
Start: 2019-04-03 | End: 2019-04-08

## 2019-04-03 RX ORDER — OSELTAMIVIR PHOSPHATE 75 MG/1
75 CAPSULE ORAL 2 TIMES DAILY
Qty: 10 CAPSULE | Refills: 0 | Status: SHIPPED | OUTPATIENT
Start: 2019-04-03 | End: 2019-04-08

## 2019-04-03 RX ADMIN — IBUPROFEN 600 MG: 600 TABLET ORAL at 12:04

## 2019-04-03 RX ADMIN — CEFTRIAXONE SODIUM 1 G: 1 INJECTION, POWDER, FOR SOLUTION INTRAMUSCULAR; INTRAVENOUS at 12:04

## 2019-04-03 NOTE — ED PROVIDER NOTES
Encounter Date: 4/2/2019       History     Chief Complaint   Patient presents with    Generalized Body Aches     The history is provided by the patient.   General Illness    The current episode started today. The problem has been unchanged. The pain is at a severity of 2/10. Nothing relieves the symptoms. Nothing aggravates the symptoms. Associated symptoms include a fever and muscle aches. Pertinent negatives include no double vision, no abdominal pain, no constipation, no diarrhea, no nausea, no vomiting, no congestion, no ear discharge, no ear pain, no headaches, no mouth sores, no rhinorrhea, no stridor, no swollen glands, no neck pain, no neck stiffness, no cough, no shortness of breath, no rash, no discharge, no pain and no eye redness.     Review of patient's allergies indicates:  No Known Allergies  Past Medical History:   Diagnosis Date    Asthma     Bulging lumbar disc 2015    Chronic pelvic pain in female     Delayed gastric emptying     Fibromyalgia     GERD (gastroesophageal reflux disease)     Lumbosacral radiculopathy     Osteoarthritis      Past Surgical History:   Procedure Laterality Date    ADENOIDECTOMY      APPENDECTOMY      laparoscopic    co 2 laser      to cervix about 5 years ago    COLONOSCOPY N/A 12/21/2016    [facility] at [facility]    COLPOSCOPY      ESOPHAGOGASTRODUODENOSCOPY (EGD) N/A 12/21/2016    Performed by [facility] at [facility] ENDO    LAPAROSCOPY-DIAGNOSTIC N/A 8/11/2014    Performed by Emmett Macedo [facility] at [facility] OR    PELVIC LAPAROSCOPY  8/11/14    normal    TONSILLECTOMY      TUBAL LIGATION      UPPER GASTROINTESTINAL ENDOSCOPY       Family History   Problem Relation Age of Onset    Lupus Mother     Hypertension Father      Social History     Tobacco Use    Smoking status: Never Smoker    Smokeless tobacco: Never Used   Substance Use Topics    Alcohol use: No     Alcohol/week: 0.0 oz    Drug use: No     Review of Systems    Constitutional: Positive for fever.   HENT: Negative for congestion, ear discharge, ear pain, mouth sores and rhinorrhea.    Eyes: Negative for double vision, pain, discharge and redness.   Respiratory: Negative for cough, shortness of breath and stridor.    Gastrointestinal: Negative for abdominal pain, constipation, diarrhea, nausea and vomiting.   Genitourinary: Negative for dysuria and enuresis.   Musculoskeletal: Negative for back pain and neck pain.   Skin: Negative for rash.   Neurological: Negative for seizures and headaches.       Physical Exam     Initial Vitals [04/02/19 2240]   BP Pulse Resp Temp SpO2   133/80 (!) 116 20 (!) 101 °F (38.3 °C) 98 %      MAP       --         Physical Exam    Nursing note and vitals reviewed.  Constitutional: She appears well-developed and well-nourished. She is not diaphoretic. No distress.   HENT:   Head: Normocephalic and atraumatic.   Mouth/Throat: No oropharyngeal exudate.   Eyes: EOM are normal. Pupils are equal, round, and reactive to light.   Neck: Normal range of motion. Neck supple. No JVD present.   Pulmonary/Chest: Breath sounds normal. No respiratory distress. She has no wheezes. She has no rhonchi. She has no rales.   Abdominal: Soft. Bowel sounds are normal. She exhibits no distension. There is no tenderness. There is no rebound and no guarding.   Musculoskeletal: Normal range of motion. She exhibits no edema or tenderness.   Neurological: She is alert and oriented to person, place, and time. No sensory deficit.   Skin: No rash noted.         ED Course   Procedures  Labs Reviewed   INFLUENZA A & B BY MOLECULAR   URINALYSIS   PREGNANCY TEST, URINE RAPID          Imaging Results    None                               Clinical Impression:       ICD-10-CM ICD-9-CM   1. Long term current use of aralen Z79.899 V58.69   2. Generalized body aches R52 780.96         Disposition:   Disposition: Discharged  Condition: Stable                        Chriss Case  MD  04/03/19 0014

## 2019-05-13 ENCOUNTER — HOSPITAL ENCOUNTER (EMERGENCY)
Facility: HOSPITAL | Age: 39
Discharge: HOME | End: 2019-05-13
Attending: SURGERY
Payer: COMMERCIAL

## 2019-05-13 VITALS
SYSTOLIC BLOOD PRESSURE: 123 MMHG | OXYGEN SATURATION: 99 % | HEART RATE: 70 BPM | TEMPERATURE: 97 F | RESPIRATION RATE: 16 BRPM | DIASTOLIC BLOOD PRESSURE: 82 MMHG

## 2019-05-13 DIAGNOSIS — G43.809 OTHER MIGRAINE WITHOUT STATUS MIGRAINOSUS, NOT INTRACTABLE: ICD-10-CM

## 2019-05-13 DIAGNOSIS — R53.83 FATIGUE: ICD-10-CM

## 2019-05-13 DIAGNOSIS — F41.9 ANXIETY: Primary | ICD-10-CM

## 2019-05-13 LAB
ALBUMIN SERPL BCP-MCNC: 4 G/DL (ref 3.5–5.2)
ALP SERPL-CCNC: 75 U/L (ref 55–135)
ALT SERPL W/O P-5'-P-CCNC: 20 U/L (ref 10–44)
ANION GAP SERPL CALC-SCNC: 11 MMOL/L (ref 8–16)
APTT BLDCRRT: 26.4 SEC (ref 21–32)
AST SERPL-CCNC: 14 U/L (ref 10–40)
BASOPHILS # BLD AUTO: 0.01 K/UL (ref 0–0.2)
BASOPHILS NFR BLD: 0.1 % (ref 0–1.9)
BILIRUB SERPL-MCNC: 0.5 MG/DL (ref 0.1–1)
BNP SERPL-MCNC: 36 PG/ML (ref 0–99)
BUN SERPL-MCNC: 8 MG/DL (ref 6–20)
CALCIUM SERPL-MCNC: 9.7 MG/DL (ref 8.7–10.5)
CHLORIDE SERPL-SCNC: 108 MMOL/L (ref 95–110)
CK MB SERPL-MCNC: 0.5 NG/ML (ref 0.1–6.5)
CK MB SERPL-MCNC: 0.5 NG/ML (ref 0.1–6.5)
CK MB SERPL-RTO: 1.1 % (ref 0–5)
CK MB SERPL-RTO: 1.2 % (ref 0–5)
CK SERPL-CCNC: 41 U/L (ref 20–180)
CK SERPL-CCNC: 41 U/L (ref 20–180)
CK SERPL-CCNC: 44 U/L (ref 20–180)
CK SERPL-CCNC: 44 U/L (ref 20–180)
CO2 SERPL-SCNC: 20 MMOL/L (ref 23–29)
CREAT SERPL-MCNC: 0.7 MG/DL (ref 0.5–1.4)
D DIMER PPP IA.FEU-MCNC: 0.34 MG/L FEU
DIFFERENTIAL METHOD: NORMAL
EOSINOPHIL # BLD AUTO: 0.2 K/UL (ref 0–0.5)
EOSINOPHIL NFR BLD: 2.3 % (ref 0–8)
ERYTHROCYTE [DISTWIDTH] IN BLOOD BY AUTOMATED COUNT: 14.2 % (ref 11.5–14.5)
EST. GFR  (AFRICAN AMERICAN): >60 ML/MIN/1.73 M^2
EST. GFR  (NON AFRICAN AMERICAN): >60 ML/MIN/1.73 M^2
GLUCOSE SERPL-MCNC: 96 MG/DL (ref 70–110)
HCT VFR BLD AUTO: 41.9 % (ref 37–48.5)
HGB BLD-MCNC: 14 G/DL (ref 12–16)
INR PPP: 1 (ref 0.8–1.2)
LYMPHOCYTES # BLD AUTO: 1.9 K/UL (ref 1–4.8)
LYMPHOCYTES NFR BLD: 25.3 % (ref 18–48)
MAGNESIUM SERPL-MCNC: 1.9 MG/DL (ref 1.6–2.6)
MCH RBC QN AUTO: 29.7 PG (ref 27–31)
MCHC RBC AUTO-ENTMCNC: 33.4 G/DL (ref 32–36)
MCV RBC AUTO: 89 FL (ref 82–98)
MONOCYTES # BLD AUTO: 0.4 K/UL (ref 0.3–1)
MONOCYTES NFR BLD: 5.5 % (ref 4–15)
NEUTROPHILS # BLD AUTO: 5 K/UL (ref 1.8–7.7)
NEUTROPHILS NFR BLD: 66.8 % (ref 38–73)
PHOSPHATE SERPL-MCNC: 1.7 MG/DL (ref 2.7–4.5)
PLATELET # BLD AUTO: 311 K/UL (ref 150–350)
PMV BLD AUTO: 9.9 FL (ref 9.2–12.9)
POTASSIUM SERPL-SCNC: 3.2 MMOL/L (ref 3.5–5.1)
PROT SERPL-MCNC: 7.9 G/DL (ref 6–8.4)
PROTHROMBIN TIME: 10.6 SEC (ref 9–12.5)
RBC # BLD AUTO: 4.71 M/UL (ref 4–5.4)
SODIUM SERPL-SCNC: 139 MMOL/L (ref 136–145)
TROPONIN I SERPL DL<=0.01 NG/ML-MCNC: <0.006 NG/ML (ref 0–0.03)
TROPONIN I SERPL DL<=0.01 NG/ML-MCNC: <0.006 NG/ML (ref 0–0.03)
TSH SERPL DL<=0.005 MIU/L-ACNC: 0.87 UIU/ML (ref 0.4–4)
WBC # BLD AUTO: 7.44 K/UL (ref 3.9–12.7)

## 2019-05-13 PROCEDURE — 96361 HYDRATE IV INFUSION ADD-ON: CPT

## 2019-05-13 PROCEDURE — 84484 ASSAY OF TROPONIN QUANT: CPT

## 2019-05-13 PROCEDURE — 96375 TX/PRO/DX INJ NEW DRUG ADDON: CPT

## 2019-05-13 PROCEDURE — 82550 ASSAY OF CK (CPK): CPT | Mod: 91

## 2019-05-13 PROCEDURE — 83735 ASSAY OF MAGNESIUM: CPT

## 2019-05-13 PROCEDURE — 25000003 PHARM REV CODE 250: Performed by: SURGERY

## 2019-05-13 PROCEDURE — 85025 COMPLETE CBC W/AUTO DIFF WBC: CPT

## 2019-05-13 PROCEDURE — 83880 ASSAY OF NATRIURETIC PEPTIDE: CPT

## 2019-05-13 PROCEDURE — 93010 EKG 12-LEAD: ICD-10-PCS | Mod: ,,, | Performed by: INTERNAL MEDICINE

## 2019-05-13 PROCEDURE — 63600175 PHARM REV CODE 636 W HCPCS: Performed by: SURGERY

## 2019-05-13 PROCEDURE — 85379 FIBRIN DEGRADATION QUANT: CPT

## 2019-05-13 PROCEDURE — 80053 COMPREHEN METABOLIC PANEL: CPT

## 2019-05-13 PROCEDURE — 82553 CREATINE MB FRACTION: CPT

## 2019-05-13 PROCEDURE — 84443 ASSAY THYROID STIM HORMONE: CPT

## 2019-05-13 PROCEDURE — 85730 THROMBOPLASTIN TIME PARTIAL: CPT

## 2019-05-13 PROCEDURE — 84100 ASSAY OF PHOSPHORUS: CPT

## 2019-05-13 PROCEDURE — 99285 EMERGENCY DEPT VISIT HI MDM: CPT | Mod: 25

## 2019-05-13 PROCEDURE — 93005 ELECTROCARDIOGRAM TRACING: CPT

## 2019-05-13 PROCEDURE — 93010 ELECTROCARDIOGRAM REPORT: CPT | Mod: ,,, | Performed by: INTERNAL MEDICINE

## 2019-05-13 PROCEDURE — 96365 THER/PROPH/DIAG IV INF INIT: CPT

## 2019-05-13 PROCEDURE — 85610 PROTHROMBIN TIME: CPT

## 2019-05-13 PROCEDURE — 36415 COLL VENOUS BLD VENIPUNCTURE: CPT

## 2019-05-13 RX ORDER — ONDANSETRON 4 MG/1
4 TABLET, ORALLY DISINTEGRATING ORAL EVERY 8 HOURS PRN
Qty: 20 TABLET | Refills: 0 | OUTPATIENT
Start: 2019-05-13 | End: 2020-10-11

## 2019-05-13 RX ORDER — KETOROLAC TROMETHAMINE 30 MG/ML
30 INJECTION, SOLUTION INTRAMUSCULAR; INTRAVENOUS
Status: COMPLETED | OUTPATIENT
Start: 2019-05-13 | End: 2019-05-13

## 2019-05-13 RX ORDER — SODIUM CHLORIDE 9 MG/ML
1000 INJECTION, SOLUTION INTRAVENOUS
Status: COMPLETED | OUTPATIENT
Start: 2019-05-13 | End: 2019-05-13

## 2019-05-13 RX ORDER — ONDANSETRON 2 MG/ML
4 INJECTION INTRAMUSCULAR; INTRAVENOUS
Status: COMPLETED | OUTPATIENT
Start: 2019-05-13 | End: 2019-05-13

## 2019-05-13 RX ADMIN — SODIUM CHLORIDE 1000 ML: 0.9 INJECTION, SOLUTION INTRAVENOUS at 09:05

## 2019-05-13 RX ADMIN — ONDANSETRON 4 MG: 2 INJECTION INTRAMUSCULAR; INTRAVENOUS at 07:05

## 2019-05-13 RX ADMIN — PROMETHAZINE HYDROCHLORIDE 12.5 MG: 25 INJECTION INTRAMUSCULAR; INTRAVENOUS at 09:05

## 2019-05-13 RX ADMIN — KETOROLAC TROMETHAMINE 30 MG: 30 INJECTION, SOLUTION INTRAMUSCULAR at 07:05

## 2019-05-13 RX ADMIN — SODIUM CHLORIDE 1000 ML: 0.9 INJECTION, SOLUTION INTRAVENOUS at 07:05

## 2019-05-13 NOTE — ED TRIAGE NOTES
38 y.o. female presents to ER ED 05/ED 05   Chief Complaint   Patient presents with    Fatigue   Pt reports fatigue onset this morning, reports vomiting x2 today. No acute distress noted.

## 2019-05-14 NOTE — ED PROVIDER NOTES
[facility]                                                 Chief Complaint  38 y.o. female with Fatigue    History of Present Illness  Epifanio Grubbs presents to the emergency room with headache today  Patient has anxiety and chronic headache, long history of migraine headaches  Patient also has fibromyalgia with chronic pain, chronic nausea issues per history  Patient is alert appropriate, normal vision, no photophobia, no active emesis here  Patient states she gets migraine attacks and chronic pain from time to time now  Patient denies any chest pain or shortness of breath, no abdominal pain per ROS  Patient states she feels tired all the time, afebrile with good stable vital signs now    The history is provided by the patient   device was not used during this ER visit     Past Medical History   -- Asthma     -- Chronic pelvic pain in female     -- Fibromyalgia     -- GERD (gastroesophageal reflux disease)     -- Osteoarthritis        Past Surgical History   -- Tubal ligation       -- Appendectomy       -- Co 2 laser       -- Pelvic laparoscopy       -- Tonsillectomy       -- Adenoidectomy       -- Colonoscopy          No Known Allergies     I have reviewed all of this patient's past medical, surgical, family, and social   histories as well as active allergies and medications documented in the  electronic medical record    Review of Systems and Physical Exam      Review of Systems  -- Constitution - no fever, denies fatigue, no weakness, no chills  -- Eyes - no tearing or redness, no visual disturbance  -- Ear, Nose - no tinnitus or earache, no nasal congestion or discharge  -- Mouth,Throat - no sore throat, no toothache, normal voice, normal swallowing  -- Respiratory - denies cough and congestion, no shortness of breath, no PATEL  -- Cardiovascular - denies chest pain, no palpitations, denies claudication  -- Gastrointestinal - nausea, vomiting, no abdominal pain  or diarrhea  -- Genitourinary - no dysuria, denies flank pain, no hematuria, no STD risk  -- Musculoskeletal - denies back pain, negative for trauma or injury  -- Neurological - headache, denies weakness or seizure; no LOC  -- Skin - denies pallor, rash, or changes in skin. no hives or welts noted  -- Psychiatric - Denies SI or HI, no psychosis or fractured thought noted     Vital Signs  Her blood pressure is 143/93 and her pulse is 82.   Her respiration is 19 and oxygen saturation is 98%.     Physical Exam  -- Nursing note and vitals reviewed  -- Constitutional: Appears well-developed and well-nourished  -- Head: Atraumatic. Normocephalic. No obvious abnormality  -- Eyes: Pupils are equal and reactive to light. Normal conjunctiva and lids  -- Nose: Nose normal in appearance, nares grossly normal. No discharge  -- Throat: Mucous membranes moist, pharynx normal, normal tonsils. No lesions   -- Ears: External ears and TM normal bilaterally. Normal hearing and no drainage  -- Neck: Normal range of motion. Neck supple. No masses, trachea midline  -- Cardiac: Normal rate, regular rhythm and normal heart sounds  -- Pulmonary: Normal respiratory effort, breath sounds clear to auscultation  -- Abdominal: Soft, no tenderness. Normal bowel sounds. Normal liver edge  -- Musculoskeletal: Normal range of motion, no effusions. Joints stable   -- Neurological: No focal deficits. Showed good interaction with staff  -- Vascular: Posterior tibial, dorsalis pedis and radial pulses 2+ bilaterally      Emergency Room Course      Lab Results     K 3.2 (L)      CO2 20 (L)   BUN 8   CREATININE 0.7   GLU 96   ALKPHOS 75   AST 14   ALT 20   BILITOT 0.5   ALBUMIN 4.0   PROT 7.9   WBC 7.44   HGB 14.0   HCT 41.9      CPK 41   CPK 41   CPKMB 0.5   TROPONINI <0.006   INR 1.0   BNP 36   DDIMER 0.34   MG 1.9   TSH 0.871     EKG   -- The EKG findings today were without concerning findings from baseline    Radiology  -- The CT of  the head performed in the ER today was negative for acute pathology     Medications Given  0.9%  NaCl infusion (0 mLs Intravenous Stopped 5/13/19 2100)   ketorolac injection 30 mg (30 mg Intravenous Given 5/13/19 1916)   ondansetron injection 4 mg (4 mg Intravenous Given 5/13/19 1917)   0.9%  NaCl infusion (1,000 mLs Intravenous New Bag 5/13/19 2126)   promethazine (PHENERGAN) 12.5 mg in dextrose 5 % 50 mL IVPB      Diagnosis  -- Anxiety  -- Fatigue   -- Other migraine     Disposition and Plan  -- Disposition: home  -- Condition: stable  -- Follow-up: Patient to follow up with Alka Mccullough NP in 1-2 days.  -- I advised the patient that we have found no life threatening condition today  -- At this time, I believe the patient is clinically stable for discharge.   -- The patient acknowledges that close follow up with a MD is required   -- Patient agrees to comply with all instruction and direction given in the ER    This note is dictated on M*Modal word recognition program.  There are word recognition mistakes that are occasionally missed on review.         Antonino Erickson MD  05/13/19 6527

## 2019-06-17 ENCOUNTER — HOSPITAL ENCOUNTER (EMERGENCY)
Facility: HOSPITAL | Age: 39
Discharge: HOME | End: 2019-06-17
Attending: SURGERY
Payer: COMMERCIAL

## 2019-06-17 VITALS
TEMPERATURE: 98 F | SYSTOLIC BLOOD PRESSURE: 130 MMHG | OXYGEN SATURATION: 99 % | RESPIRATION RATE: 20 BRPM | HEART RATE: 80 BPM | DIASTOLIC BLOOD PRESSURE: 80 MMHG

## 2019-06-17 DIAGNOSIS — T14.8XXA DRAINAGE FROM WOUND: Primary | ICD-10-CM

## 2019-06-17 PROCEDURE — S0077 INJECTION, CLINDAMYCIN PHOSP: HCPCS | Performed by: SURGERY

## 2019-06-17 PROCEDURE — 25000003 PHARM REV CODE 250: Performed by: SURGERY

## 2019-06-17 PROCEDURE — 96372 THER/PROPH/DIAG INJ SC/IM: CPT

## 2019-06-17 PROCEDURE — 99284 EMERGENCY DEPT VISIT MOD MDM: CPT | Mod: 25

## 2019-06-17 RX ORDER — CLINDAMYCIN HYDROCHLORIDE 300 MG/1
300 CAPSULE ORAL 4 TIMES DAILY
Qty: 28 CAPSULE | Refills: 0 | Status: SHIPPED | OUTPATIENT
Start: 2019-06-17 | End: 2019-06-24

## 2019-06-17 RX ORDER — CLINDAMYCIN PHOSPHATE 150 MG/ML
600 INJECTION, SOLUTION INTRAVENOUS
Status: COMPLETED | OUTPATIENT
Start: 2019-06-17 | End: 2019-06-17

## 2019-06-17 RX ORDER — MUPIROCIN 20 MG/G
OINTMENT TOPICAL 3 TIMES DAILY
Qty: 15 G | Refills: 0 | Status: SHIPPED | OUTPATIENT
Start: 2019-06-17 | End: 2019-06-27

## 2019-06-17 RX ADMIN — CLINDAMYCIN PHOSPHATE 600 MG: 150 INJECTION, SOLUTION INTRAMUSCULAR; INTRAVENOUS at 10:06

## 2019-06-17 NOTE — ED TRIAGE NOTES
38 y.o. female presents to ER ED 03 /ED 03A   Chief Complaint   Patient presents with    Wound Check   Pt s/p lumbar infusion 3 weeks ago, concerned about wound to lower abdomen. No acute distress noted.

## 2019-06-17 NOTE — ED PROVIDER NOTES
[facility]                                                 Chief Complaint  38 y.o. female with Wound Check    History of Present Illness  Epifanio Grubbs presents to the emergency room for a wound check  Patient had lower lumbar surgery with an anterior approach couple weeks ago  Noticed that her abdominal wound has a small granulated area at lower aspect  On exam, patient has minor granulation tissue with no obvious dehiscence noted  The wound is not open, the wound is intact, the wound appears to be healing now  Patient has no fever or complicating issues, the patient is not a diabetic by HX  Pt has no signs of cellulitis, no fluctuance, no drainage, wound is clean/healing    The history is provided by the patient   device was not used during this ER visit     Past Medical History   -- Asthma     -- Chronic pelvic pain in female     -- Fibromyalgia     -- GERD (gastroesophageal reflux disease)     -- Osteoarthritis        Past Surgical History   -- Tubal ligation       -- Appendectomy       -- Co 2 laser       -- Pelvic laparoscopy       -- Tonsillectomy       -- Adenoidectomy       -- Colonoscopy          No Known Allergies     I have reviewed all of this patient's past medical, surgical, family, and social   histories as well as active allergies and medications documented in the  electronic medical record    Review of Systems and Physical Exam      Review of Systems  -- Constitution - no fever, denies fatigue, no weakness, no chills  -- Eyes - no tearing or redness, no visual disturbance  -- Ear, Nose - no tinnitus or earache, no nasal congestion or discharge  -- Mouth,Throat - no sore throat, no toothache, normal voice, normal swallowing  -- Respiratory - denies cough and congestion, no shortness of breath, no PATEL  -- Cardiovascular - denies chest pain, no palpitations, denies claudication  -- Gastrointestinal - denies abdominal pain, nausea, vomiting, or  diarrhea  -- Genitourinary - no dysuria, no hematuria, no flank pain, no bladder pain  -- Musculoskeletal - denies back pain, negative for trauma or injury  -- Neurological - no headache, denies weakness or seizure; no LOC  -- Skin - small area of induration at the lower aspect of the abdominal wound    Vital Signs  Oral temperature is 97.8 °F (36.6 °C).   Her blood pressure is 134/89 and her pulse is 86.   Her respiration is 20 and oxygen saturation is 100%.     Physical Exam  -- Nursing note and vitals reviewed  -- Constitutional: Appears well-developed and well-nourished  -- Head: Atraumatic. Normocephalic. No obvious abnormality  -- Eyes: Pupils are equal and reactive to light. Normal conjunctiva and lids  -- Cardiac: Normal rate, regular rhythm and normal heart sounds  -- Pulmonary: Normal respiratory effort, breath sounds clear to auscultation  -- Abdominal: Soft, no tenderness. Normal bowel sounds. Normal liver edge  -- Musculoskeletal: Normal range of motion, no effusions. Joints stable   -- Neurological: No focal deficits. Showed good interaction with staff  -- Skin:  Mild area of induration to the lower aspect of the abdominal wound    Emergency Room Course      Medications Given  clindamycin injection 600 mg (600 mg Intramuscular Given 6/17/19 1007)     Diagnosis  -- The encounter diagnosis was Drainage from wound.    Disposition and Plan  -- Disposition: home  -- Condition: stable  -- Follow-up: Patient to follow up with MD in 1-2 days  -- I advised the patient that we have found no life threatening condition today  -- At this time, I believe the patient is clinically stable for discharge.   -- The patient acknowledges that close follow up with a MD is required   -- Patient agrees to comply with all instruction and direction given in the ER    This note is dictated on M*Modal word recognition program.  There are word recognition mistakes that are occasionally missed on review.          Antonino Erickson,  MD  06/17/19 6706

## 2019-12-18 ENCOUNTER — HOSPITAL ENCOUNTER (EMERGENCY)
Facility: HOSPITAL | Age: 39
Discharge: HOME | End: 2019-12-18
Attending: SURGERY
Payer: COMMERCIAL

## 2019-12-18 VITALS
DIASTOLIC BLOOD PRESSURE: 70 MMHG | RESPIRATION RATE: 16 BRPM | SYSTOLIC BLOOD PRESSURE: 122 MMHG | OXYGEN SATURATION: 100 % | WEIGHT: 205.5 LBS | BODY MASS INDEX: 34.19 KG/M2 | TEMPERATURE: 99 F | HEART RATE: 81 BPM

## 2019-12-18 DIAGNOSIS — R51.9 CHRONIC NONINTRACTABLE HEADACHE, UNSPECIFIED HEADACHE TYPE: ICD-10-CM

## 2019-12-18 DIAGNOSIS — F45.42 PAIN DISORDER ASSOCIATED WITH PSYCHOLOGICAL FACTORS AND MEDICAL CONDITION: Primary | ICD-10-CM

## 2019-12-18 DIAGNOSIS — G89.29 CHRONIC NONINTRACTABLE HEADACHE, UNSPECIFIED HEADACHE TYPE: ICD-10-CM

## 2019-12-18 PROCEDURE — 96361 HYDRATE IV INFUSION ADD-ON: CPT

## 2019-12-18 PROCEDURE — 96374 THER/PROPH/DIAG INJ IV PUSH: CPT

## 2019-12-18 PROCEDURE — 63600175 PHARM REV CODE 636 W HCPCS: Performed by: NURSE PRACTITIONER

## 2019-12-18 PROCEDURE — 99284 EMERGENCY DEPT VISIT MOD MDM: CPT | Mod: 25

## 2019-12-18 PROCEDURE — 96372 THER/PROPH/DIAG INJ SC/IM: CPT | Mod: 59

## 2019-12-18 RX ORDER — KETOROLAC TROMETHAMINE 30 MG/ML
60 INJECTION, SOLUTION INTRAMUSCULAR; INTRAVENOUS
Status: COMPLETED | OUTPATIENT
Start: 2019-12-18 | End: 2019-12-18

## 2019-12-18 RX ORDER — METOCLOPRAMIDE HYDROCHLORIDE 5 MG/ML
10 INJECTION INTRAMUSCULAR; INTRAVENOUS
Status: COMPLETED | OUTPATIENT
Start: 2019-12-18 | End: 2019-12-18

## 2019-12-18 RX ORDER — PROMETHAZINE HYDROCHLORIDE 25 MG/ML
25 INJECTION, SOLUTION INTRAMUSCULAR; INTRAVENOUS
Status: COMPLETED | OUTPATIENT
Start: 2019-12-18 | End: 2019-12-18

## 2019-12-18 RX ADMIN — KETOROLAC TROMETHAMINE 60 MG: 30 INJECTION, SOLUTION INTRAMUSCULAR at 01:12

## 2019-12-18 RX ADMIN — SODIUM CHLORIDE 1000 ML: 0.9 INJECTION, SOLUTION INTRAVENOUS at 02:12

## 2019-12-18 RX ADMIN — METOCLOPRAMIDE 10 MG: 5 INJECTION, SOLUTION INTRAMUSCULAR; INTRAVENOUS at 02:12

## 2019-12-18 RX ADMIN — PROMETHAZINE HYDROCHLORIDE 25 MG: 25 INJECTION INTRAMUSCULAR; INTRAVENOUS at 01:12

## 2019-12-18 NOTE — ED PROVIDER NOTES
Encounter Date: 12/18/2019       History     Chief Complaint   Patient presents with    Headache     The history is provided by the patient.   Headache    This is a chronic problem. The current episode started in the past 7 days. The problem occurs constantly. The problem has been unchanged. The pain is located in the right unilateral and frontal region. The pain quality is similar to prior headaches. The pain is at a severity of 10/10. Associated symptoms include blurred vision, nausea, phonophobia and photophobia. Pertinent negatives include no abdominal pain, back pain, coughing, dizziness, ear pain, eye pain, eye redness, eye watering, fever, neck pain, numbness, rhinorrhea, scalp tenderness, seizures, sinus pressure, sore throat, vomiting or weakness. The symptoms are aggravated by bright light and noise. She has tried NSAIDs for the symptoms. The treatment provided no relief. Her past medical history is significant for migraine headaches.     Review of patient's allergies indicates:  No Known Allergies  Past Medical History:   Diagnosis Date    Asthma     Bulging lumbar disc 2015    Chronic pelvic pain in female     Delayed gastric emptying     Fibromyalgia     GERD (gastroesophageal reflux disease)     Lumbosacral radiculopathy     Osteoarthritis      Past Surgical History:   Procedure Laterality Date    ADENOIDECTOMY      APPENDECTOMY      laparoscopic    co 2 laser      to cervix about 5 years ago    COLONOSCOPY N/A 12/21/2016    Procedure: COLONOSCOPY;  Surgeon: Aylin García MD;  Location: [facility];  Service: Endoscopy;  Laterality: N/A;    COLPOSCOPY      PELVIC LAPAROSCOPY  8/11/14    normal    TONSILLECTOMY      TUBAL LIGATION      UPPER GASTROINTESTINAL ENDOSCOPY       Family History   Problem Relation Age of Onset    Lupus Mother     Hypertension Father      Social History     Tobacco Use    Smoking status: Never Smoker    Smokeless tobacco: Never Used   Substance Use  Topics    Alcohol use: No     Alcohol/week: 0.0 standard drinks    Drug use: No     Review of Systems   Constitutional: Negative for fever.   HENT: Negative for congestion, ear pain, rhinorrhea, sinus pressure, sore throat and trouble swallowing.    Eyes: Positive for blurred vision and photophobia. Negative for pain and redness.   Respiratory: Negative for cough and shortness of breath.    Cardiovascular: Negative for chest pain.   Gastrointestinal: Positive for nausea. Negative for abdominal pain and vomiting.   Genitourinary: Negative for difficulty urinating and dysuria.   Musculoskeletal: Negative for arthralgias, back pain, myalgias and neck pain.   Skin: Negative for rash and wound.   Neurological: Positive for headaches. Negative for dizziness, seizures, weakness and numbness.   Psychiatric/Behavioral: Negative.        Physical Exam     Initial Vitals [12/18/19 1249]   BP Pulse Resp Temp SpO2   126/78 85 17 98.5 °F (36.9 °C) 100 %      MAP       --         Physical Exam    Nursing note and vitals reviewed.  Constitutional: No distress.   HENT:   Head: Normocephalic and atraumatic.   Right Ear: Tympanic membrane, external ear and ear canal normal.   Left Ear: Tympanic membrane, external ear and ear canal normal.   Nose: Nose normal.   Mouth/Throat: Oropharynx is clear and moist and mucous membranes are normal.   Eyes: Conjunctivae, EOM and lids are normal. Pupils are equal, round, and reactive to light.   Neck: Neck supple.   Cardiovascular: Normal rate, regular rhythm, S1 normal, S2 normal, normal heart sounds and intact distal pulses.   Pulmonary/Chest: Effort normal and breath sounds normal. No respiratory distress.   Abdominal: Soft. Bowel sounds are normal. There is no tenderness.   Musculoskeletal: Normal range of motion.   Neurological: She is alert and oriented to person, place, and time. She has normal strength. No cranial nerve deficit or sensory deficit. GCS eye subscore is 4. GCS verbal subscore  is 5. GCS motor subscore is 6.   Skin: Skin is warm and dry. Capillary refill takes less than 2 seconds. No rash noted.   Psychiatric: She has a normal mood and affect. Her speech is normal and behavior is normal.         ED Course   Procedures  Labs Reviewed - No data to display       Imaging Results    None                    Medications   ketorolac injection 60 mg (60 mg Intramuscular Given 12/18/19 1304)   promethazine injection 25 mg (25 mg Intramuscular Given 12/18/19 1304)   sodium chloride 0.9% bolus 1,000 mL (0 mLs Intravenous Stopped 12/18/19 1515)   metoclopramide HCl injection 10 mg (10 mg Intravenous Given 12/18/19 1415)              ED Course as of Dec 18 1544   Wed Dec 18, 2019   1513 Patient reports improvement in chronic headache.  No neurological deficits are noted on exam.    [EW]   1514 Head CT from 05/13/2019 reviewed.  No acute findings at that time.    [EW]      ED Course User Index  [EW] Bella Herrera NP                Clinical Impression:       ICD-10-CM ICD-9-CM   1. 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 Chronic nonintractable headache, unspecified headache type R51 784.0         Disposition:   Disposition: Discharged  Condition: Stable    The patient acknowledges that close follow up with medical provider is required. Instructed to follow up with PCP within 2 days. Patient was given specific return precautions. The patient agrees to comply with all instruction and directions given in the ER.                          Bella Herrera NP  12/18/19 6996

## 2019-12-18 NOTE — ED TRIAGE NOTES
39 y.o. female presents to ER ED 02/ED 02A   Chief Complaint   Patient presents with    Headache   Pt reports headache for week and a half. Pt reports she tried a few different medications at home with no relief. No acute distress noted.

## 2019-12-18 NOTE — DISCHARGE INSTRUCTIONS
**Follow up with PCP in 24-48 hours. Return to ER with worsening of symptoms.     **Over the counter tylenol or motrin as needed for pain and/or fever as directed on package insert. Drink plenty fluids. Get plenty rest. Wash hands frequently.     **Our goal in the emergency department is to always give you outstanding care and exceptional service. You may receive a survey by mail or e-mail in the next week regarding your experience in our ED. We would greatly appreciate your completing and returning the survey. Your feedback provides us with a way to recognize our staff who give very good care and it helps us learn how to improve when your experience was below our aspiration of excellence.

## 2020-04-19 ENCOUNTER — HOSPITAL ENCOUNTER (EMERGENCY)
Facility: HOSPITAL | Age: 40
Discharge: HOME | End: 2020-04-19
Attending: SURGERY
Payer: COMMERCIAL

## 2020-04-19 VITALS
WEIGHT: 182 LBS | HEIGHT: 65 IN | SYSTOLIC BLOOD PRESSURE: 128 MMHG | BODY MASS INDEX: 30.32 KG/M2 | TEMPERATURE: 98 F | OXYGEN SATURATION: 99 % | RESPIRATION RATE: 18 BRPM | DIASTOLIC BLOOD PRESSURE: 84 MMHG | HEART RATE: 88 BPM

## 2020-04-19 DIAGNOSIS — J32.9 SINUSITIS, UNSPECIFIED CHRONICITY, UNSPECIFIED LOCATION: Primary | ICD-10-CM

## 2020-04-19 LAB
ALBUMIN SERPL BCP-MCNC: 3.6 G/DL (ref 3.5–5.2)
ALP SERPL-CCNC: 67 U/L (ref 55–135)
ALT SERPL W/O P-5'-P-CCNC: 23 U/L (ref 10–44)
AMPHET+METHAMPHET UR QL: NEGATIVE
ANION GAP SERPL CALC-SCNC: 8 MMOL/L (ref 8–16)
AST SERPL-CCNC: 12 U/L (ref 10–40)
B-HCG UR QL: NEGATIVE
BACTERIA #/AREA URNS HPF: ABNORMAL /HPF
BARBITURATES UR QL SCN>200 NG/ML: NEGATIVE
BASOPHILS # BLD AUTO: 0.03 K/UL (ref 0–0.2)
BASOPHILS NFR BLD: 0.3 % (ref 0–1.9)
BENZODIAZ UR QL SCN>200 NG/ML: NEGATIVE
BILIRUB SERPL-MCNC: 0.3 MG/DL (ref 0.1–1)
BILIRUB UR QL STRIP: NEGATIVE
BNP SERPL-MCNC: <10 PG/ML (ref 0–99)
BUN SERPL-MCNC: 8 MG/DL (ref 6–20)
BZE UR QL SCN: NEGATIVE
CALCIUM SERPL-MCNC: 9 MG/DL (ref 8.7–10.5)
CANNABINOIDS UR QL SCN: NEGATIVE
CHLORIDE SERPL-SCNC: 108 MMOL/L (ref 95–110)
CK MB SERPL-MCNC: 0.3 NG/ML (ref 0.1–6.5)
CK MB SERPL-RTO: 0.8 % (ref 0–5)
CK SERPL-CCNC: 37 U/L (ref 20–180)
CK SERPL-CCNC: 37 U/L (ref 20–180)
CLARITY UR: CLEAR
CO2 SERPL-SCNC: 25 MMOL/L (ref 23–29)
COLOR UR: YELLOW
CREAT SERPL-MCNC: 0.8 MG/DL (ref 0.5–1.4)
CREAT UR-MCNC: 167 MG/DL (ref 15–325)
DIFFERENTIAL METHOD: ABNORMAL
EOSINOPHIL # BLD AUTO: 0.3 K/UL (ref 0–0.5)
EOSINOPHIL NFR BLD: 3.5 % (ref 0–8)
ERYTHROCYTE [DISTWIDTH] IN BLOOD BY AUTOMATED COUNT: 16 % (ref 11.5–14.5)
EST. GFR  (AFRICAN AMERICAN): >60 ML/MIN/1.73 M^2
EST. GFR  (NON AFRICAN AMERICAN): >60 ML/MIN/1.73 M^2
GLUCOSE SERPL-MCNC: 96 MG/DL (ref 70–110)
GLUCOSE UR QL STRIP: NEGATIVE
GROUP A STREP, MOLECULAR: NEGATIVE
HCT VFR BLD AUTO: 40.7 % (ref 37–48.5)
HGB BLD-MCNC: 13 G/DL (ref 12–16)
HGB UR QL STRIP: ABNORMAL
IMM GRANULOCYTES # BLD AUTO: 0.02 K/UL (ref 0–0.04)
IMM GRANULOCYTES NFR BLD AUTO: 0.2 % (ref 0–0.5)
INFLUENZA A, MOLECULAR: NEGATIVE
INFLUENZA B, MOLECULAR: NEGATIVE
KETONES UR QL STRIP: ABNORMAL
LEUKOCYTE ESTERASE UR QL STRIP: ABNORMAL
LYMPHOCYTES # BLD AUTO: 1.9 K/UL (ref 1–4.8)
LYMPHOCYTES NFR BLD: 21.2 % (ref 18–48)
MAGNESIUM SERPL-MCNC: 2.1 MG/DL (ref 1.6–2.6)
MCH RBC QN AUTO: 27.2 PG (ref 27–31)
MCHC RBC AUTO-ENTMCNC: 31.9 G/DL (ref 32–36)
MCV RBC AUTO: 85 FL (ref 82–98)
METHADONE UR QL SCN>300 NG/ML: NEGATIVE
MICROSCOPIC COMMENT: ABNORMAL
MONOCYTES # BLD AUTO: 0.6 K/UL (ref 0.3–1)
MONOCYTES NFR BLD: 6.3 % (ref 4–15)
NEUTROPHILS # BLD AUTO: 6.1 K/UL (ref 1.8–7.7)
NEUTROPHILS NFR BLD: 68.5 % (ref 38–73)
NITRITE UR QL STRIP: NEGATIVE
NRBC BLD-RTO: 0 /100 WBC
OPIATES UR QL SCN: NEGATIVE
PCP UR QL SCN>25 NG/ML: NEGATIVE
PH UR STRIP: 7 [PH] (ref 5–8)
PHOSPHATE SERPL-MCNC: 3.1 MG/DL (ref 2.7–4.5)
PLATELET # BLD AUTO: 342 K/UL (ref 150–350)
PMV BLD AUTO: 9.7 FL (ref 9.2–12.9)
POTASSIUM SERPL-SCNC: 3.9 MMOL/L (ref 3.5–5.1)
PROT SERPL-MCNC: 7.7 G/DL (ref 6–8.4)
PROT UR QL STRIP: NEGATIVE
RBC # BLD AUTO: 4.78 M/UL (ref 4–5.4)
RBC #/AREA URNS HPF: 30 /HPF (ref 0–4)
SARS-COV-2 RDRP RESP QL NAA+PROBE: NEGATIVE
SODIUM SERPL-SCNC: 141 MMOL/L (ref 136–145)
SP GR UR STRIP: 1.02 (ref 1–1.03)
SPECIMEN SOURCE: NORMAL
TOXICOLOGY INFORMATION: NORMAL
TROPONIN I SERPL DL<=0.01 NG/ML-MCNC: <0.006 NG/ML (ref 0–0.03)
TSH SERPL DL<=0.005 MIU/L-ACNC: 0.78 UIU/ML (ref 0.4–4)
URN SPEC COLLECT METH UR: ABNORMAL
UROBILINOGEN UR STRIP-ACNC: 1 EU/DL
WBC # BLD AUTO: 8.86 K/UL (ref 3.9–12.7)
WBC #/AREA URNS HPF: 5 /HPF (ref 0–5)

## 2020-04-19 PROCEDURE — 84484 ASSAY OF TROPONIN QUANT: CPT

## 2020-04-19 PROCEDURE — 82553 CREATINE MB FRACTION: CPT

## 2020-04-19 PROCEDURE — 81025 URINE PREGNANCY TEST: CPT

## 2020-04-19 PROCEDURE — 80307 DRUG TEST PRSMV CHEM ANLYZR: CPT

## 2020-04-19 PROCEDURE — 83735 ASSAY OF MAGNESIUM: CPT

## 2020-04-19 PROCEDURE — 85025 COMPLETE CBC W/AUTO DIFF WBC: CPT

## 2020-04-19 PROCEDURE — U0002 COVID-19 LAB TEST NON-CDC: HCPCS

## 2020-04-19 PROCEDURE — 93005 ELECTROCARDIOGRAM TRACING: CPT

## 2020-04-19 PROCEDURE — 84100 ASSAY OF PHOSPHORUS: CPT

## 2020-04-19 PROCEDURE — 83880 ASSAY OF NATRIURETIC PEPTIDE: CPT

## 2020-04-19 PROCEDURE — 87651 STREP A DNA AMP PROBE: CPT

## 2020-04-19 PROCEDURE — 82550 ASSAY OF CK (CPK): CPT

## 2020-04-19 PROCEDURE — 93010 EKG 12-LEAD: ICD-10-PCS | Mod: ,,, | Performed by: INTERNAL MEDICINE

## 2020-04-19 PROCEDURE — 99285 EMERGENCY DEPT VISIT HI MDM: CPT | Mod: 25

## 2020-04-19 PROCEDURE — 80053 COMPREHEN METABOLIC PANEL: CPT

## 2020-04-19 PROCEDURE — 87502 INFLUENZA DNA AMP PROBE: CPT

## 2020-04-19 PROCEDURE — 81000 URINALYSIS NONAUTO W/SCOPE: CPT | Mod: 59

## 2020-04-19 PROCEDURE — 84443 ASSAY THYROID STIM HORMONE: CPT

## 2020-04-19 PROCEDURE — 93010 ELECTROCARDIOGRAM REPORT: CPT | Mod: ,,, | Performed by: INTERNAL MEDICINE

## 2020-04-19 PROCEDURE — 36415 COLL VENOUS BLD VENIPUNCTURE: CPT

## 2020-04-19 PROCEDURE — 96372 THER/PROPH/DIAG INJ SC/IM: CPT | Mod: 59

## 2020-04-19 PROCEDURE — 63600175 PHARM REV CODE 636 W HCPCS: Performed by: SURGERY

## 2020-04-19 RX ORDER — ONDANSETRON 2 MG/ML
4 INJECTION INTRAMUSCULAR; INTRAVENOUS
Status: COMPLETED | OUTPATIENT
Start: 2020-04-19 | End: 2020-04-19

## 2020-04-19 RX ORDER — MORPHINE SULFATE 2 MG/ML
2 INJECTION, SOLUTION INTRAMUSCULAR; INTRAVENOUS
Status: COMPLETED | OUTPATIENT
Start: 2020-04-19 | End: 2020-04-19

## 2020-04-19 RX ORDER — TRAMADOL HYDROCHLORIDE 50 MG/1
50 TABLET ORAL EVERY 6 HOURS PRN
Qty: 7 TABLET | Refills: 0 | Status: SHIPPED | OUTPATIENT
Start: 2020-04-19 | End: 2020-04-21

## 2020-04-19 RX ORDER — AMOXICILLIN AND CLAVULANATE POTASSIUM 875; 125 MG/1; MG/1
1 TABLET, FILM COATED ORAL 2 TIMES DAILY
Qty: 20 TABLET | Refills: 0 | Status: SHIPPED | OUTPATIENT
Start: 2020-04-19 | End: 2020-04-29

## 2020-04-19 RX ORDER — METHYLPREDNISOLONE 4 MG/1
TABLET ORAL
Qty: 1 PACKAGE | Refills: 0 | OUTPATIENT
Start: 2020-04-19 | End: 2020-10-11

## 2020-04-19 RX ORDER — METHYLPREDNISOLONE SOD SUCC 125 MG
125 VIAL (EA) INJECTION
Status: COMPLETED | OUTPATIENT
Start: 2020-04-19 | End: 2020-04-19

## 2020-04-19 RX ADMIN — MORPHINE SULFATE 2 MG: 2 INJECTION, SOLUTION INTRAMUSCULAR; INTRAVENOUS at 05:04

## 2020-04-19 RX ADMIN — METHYLPREDNISOLONE SODIUM SUCCINATE 125 MG: 125 INJECTION, POWDER, FOR SOLUTION INTRAMUSCULAR; INTRAVENOUS at 05:04

## 2020-04-19 RX ADMIN — ONDANSETRON 4 MG: 2 INJECTION INTRAMUSCULAR; INTRAVENOUS at 05:04

## 2020-04-19 NOTE — ED PROVIDER NOTES
[facility]                                                 Chief Complaint  39 y.o. female with Headache     History of Present Illness  Epifanio Grubbs presents to the emergency room with headache  Patient with headache and sinus pressure for last 2 weeks, seen in urgent care  Patient was diagnosed with sinusitis and prescribed antibiotics at urgent care  Patient got better for 2 days and had recurrent sinus pressure and headache  Patient is alert appropriate and normotensive, normal neurologic exam today  Patient does have clear nasal drainage with nasal mucosa erythema on exam  States that she gets chronic migraine headaches frequently with sinusitis    The history is provided by the patient   device was not used during this ER visit    Past Medical History   -- Asthma     -- Chronic pelvic pain in female     -- Fibromyalgia     -- GERD (gastroesophageal reflux disease)     -- Osteoarthritis        Past Surgical History   -- Tubal ligation       -- Appendectomy       -- Co 2 laser       -- Pelvic laparoscopy       -- Tonsillectomy       -- Adenoidectomy       -- Colonoscopy          No Known Allergies     I have reviewed all of this patient's past medical, surgical, family, and social   histories as well as active allergies and medications documented in the  electronic medical record    Review of Systems and Physical Exam      Review of Systems  -- Constitution - no fever, denies fatigue, no weakness, no chills  -- Eyes - no tearing or redness, no visual disturbance  -- Ear, Nose - sneezing, nasal congestion and clear discharge   -- Mouth,Throat - no sore throat, no toothache, normal voice, normal swallowing  -- Respiratory - denies cough and congestion, no shortness of breath, no PATEL  -- Cardiovascular - denies chest pain, no palpitations, denies claudication  -- Gastrointestinal - denies abdominal pain, nausea, vomiting, or diarrhea  -- Genitourinary - no  dysuria, denies flank pain, no hematuria, no STD risk  -- Musculoskeletal - denies back pain, negative for trauma or injury  -- Neurological - headache, denies weakness or seizure; no LOC  -- Skin - denies pallor, rash, or changes in skin. no hives or welts noted  -- Psychiatric - Denies SI or HI, no psychosis or fractured thought noted     Vital Signs  Her oral temperature is 98.2 °F (36.8 °C).   Her blood pressure is 134/98 and her pulse is 90.   Her respiration is 18 and oxygen saturation is 98%.     Physical Exam  -- Nursing note and vitals reviewed  -- Constitutional: Appears well-developed and well-nourished  -- Head: Atraumatic. Normocephalic. No obvious abnormality  -- Eyes: Pupils are equal and reactive to light. Normal conjunctiva and lids  -- Nose: nasal mucosa erythema and edema; clear nasal discharge noted   -- Throat: Mucous membranes moist, pharynx normal, normal tonsils. No lesions   -- Ears: External ears and TM normal bilaterally. Normal hearing and no drainage  -- Neck: Normal range of motion. Neck supple. No masses, trachea midline  -- Cardiac: Normal rate, regular rhythm and normal heart sounds  -- Pulmonary: Normal respiratory effort, breath sounds clear to auscultation  -- Abdominal: Soft, no tenderness. Normal bowel sounds. Normal liver edge  -- Musculoskeletal: Normal range of motion, no effusions. Joints stable   -- Neurological: No focal deficits. Showed good interaction with staff  -- Vascular: Posterior tibial, dorsalis pedis and radial pulses 2+ bilaterally      Emergency Room Course      Lab Results     K 3.9      CO2 25   BUN 8   CREATININE 0.8   GLU 96   ALKPHOS 67   AST 12   ALT 23   BILITOT 0.3   ALBUMIN 3.6   PROT 7.7   WBC 8.86   HGB 13.0   HCT 40.7      CPK 37   CPK 37   CPKMB 0.3   TROPONINI <0.006   BNP <10   MG 2.1   TSH 0.782     EKG  -- The EKG findings today were without concerning findings from baseline     Radiology  -- The CT of the head performed in  the ER today was negative for acute pathology  -- Chest x-ray showed no infiltrate and showed no acute pathology     Additional Work up  -- the patient tested negative for influenza   -- The strep screen was negative  -- rapid Coronavirus PCR in the emergency room was negative     Medications Given  methylPREDNISolone sodium succinate injection 125 mg (has no administration in time range)   morphine injection 2 mg (has no administration in time range)   ondansetron injection 4 mg (has no administration in time range)     ED Physician Management  -- Diagnosis management comments: 39 y.o. female with sinus headache today  -- patient was sinus pressure and headache for last couple weeks, finished ABX  -- patient had a negative workup today including lab work and head CT in ER  -- patient feeling sinus pressure and congestion, steroids given in the ER  -- pain medicine given in the ER, Ultram prescribed on discharge today  -- on med prescribed for sinusitis, follow-up with PCP this on ER discharge  -- return to the ER with any concerning symptoms after discharge today    Diagnosis  -- The encounter diagnosis was Sinusitis, unspecified chronicity, unspecified location.    Disposition and Plan  -- Disposition: home  -- Condition: stable  -- Follow-up: Patient to follow up with Alka Mccullough NP (Inactive) in 1-2 days.  -- I advised the patient that we have found no life threatening condition today  -- At this time, I believe the patient is clinically stable for discharge.   -- The patient acknowledges that close follow up with a MD is required   -- Patient agrees to comply with all instruction and direction given in the ER    This note is dictated on M*Modal word recognition program.  There are word recognition mistakes that are occasionally missed on review.         Antonino Erickson MD  04/19/20 [id number]

## 2020-10-11 ENCOUNTER — HOSPITAL ENCOUNTER (EMERGENCY)
Facility: HOSPITAL | Age: 40
Discharge: HOME | End: 2020-10-11
Attending: SURGERY
Payer: COMMERCIAL

## 2020-10-11 VITALS
RESPIRATION RATE: 20 BRPM | BODY MASS INDEX: 34.14 KG/M2 | TEMPERATURE: 99 F | SYSTOLIC BLOOD PRESSURE: 139 MMHG | OXYGEN SATURATION: 100 % | DIASTOLIC BLOOD PRESSURE: 90 MMHG | WEIGHT: 205.13 LBS | HEART RATE: 76 BPM

## 2020-10-11 DIAGNOSIS — U07.1 COVID-19 VIRUS DETECTED: ICD-10-CM

## 2020-10-11 DIAGNOSIS — U07.1 COVID-19 VIRUS INFECTION: Primary | ICD-10-CM

## 2020-10-11 DIAGNOSIS — R11.0 NAUSEA: ICD-10-CM

## 2020-10-11 LAB
ALBUMIN SERPL BCP-MCNC: 3.6 G/DL (ref 3.5–5.2)
ALP SERPL-CCNC: 67 U/L (ref 55–135)
ALT SERPL W/O P-5'-P-CCNC: 31 U/L (ref 10–44)
AMPHET+METHAMPHET UR QL: NEGATIVE
ANION GAP SERPL CALC-SCNC: 11 MMOL/L (ref 8–16)
AST SERPL-CCNC: 19 U/L (ref 10–40)
B-HCG UR QL: NEGATIVE
BARBITURATES UR QL SCN>200 NG/ML: NEGATIVE
BASOPHILS # BLD AUTO: 0 K/UL (ref 0–0.2)
BASOPHILS NFR BLD: 0 % (ref 0–1.9)
BENZODIAZ UR QL SCN>200 NG/ML: NEGATIVE
BILIRUB SERPL-MCNC: 0.5 MG/DL (ref 0.1–1)
BILIRUB UR QL STRIP: NEGATIVE
BUN SERPL-MCNC: 6 MG/DL (ref 6–20)
BZE UR QL SCN: NEGATIVE
CALCIUM SERPL-MCNC: 8.5 MG/DL (ref 8.7–10.5)
CANNABINOIDS UR QL SCN: NEGATIVE
CHLORIDE SERPL-SCNC: 108 MMOL/L (ref 95–110)
CK MB SERPL-MCNC: 0.2 NG/ML (ref 0.1–6.5)
CK MB SERPL-RTO: 0.3 % (ref 0–5)
CK SERPL-CCNC: 61 U/L (ref 20–180)
CK SERPL-CCNC: 61 U/L (ref 20–180)
CLARITY UR: CLEAR
CO2 SERPL-SCNC: 21 MMOL/L (ref 23–29)
COLOR UR: YELLOW
CREAT SERPL-MCNC: 0.8 MG/DL (ref 0.5–1.4)
CREAT UR-MCNC: 284.2 MG/DL (ref 15–325)
CRP SERPL-MCNC: 16.7 MG/L (ref 0–8.2)
DIFFERENTIAL METHOD: ABNORMAL
EOSINOPHIL # BLD AUTO: 0 K/UL (ref 0–0.5)
EOSINOPHIL NFR BLD: 0.4 % (ref 0–8)
ERYTHROCYTE [DISTWIDTH] IN BLOOD BY AUTOMATED COUNT: 19.8 % (ref 11.5–14.5)
ERYTHROCYTE [SEDIMENTATION RATE] IN BLOOD BY WESTERGREN METHOD: 20 MM/HR (ref 0–20)
EST. GFR  (AFRICAN AMERICAN): >60 ML/MIN/1.73 M^2
EST. GFR  (NON AFRICAN AMERICAN): >60 ML/MIN/1.73 M^2
FERRITIN SERPL-MCNC: 40 NG/ML (ref 20–300)
GLUCOSE SERPL-MCNC: 106 MG/DL (ref 70–110)
GLUCOSE UR QL STRIP: NEGATIVE
HCT VFR BLD AUTO: 36.7 % (ref 37–48.5)
HGB BLD-MCNC: 13 G/DL (ref 12–16)
HGB UR QL STRIP: ABNORMAL
IMM GRANULOCYTES # BLD AUTO: 0 K/UL (ref 0–0.04)
IMM GRANULOCYTES NFR BLD AUTO: 0 % (ref 0–0.5)
KETONES UR QL STRIP: NEGATIVE
LDH SERPL L TO P-CCNC: 205 U/L (ref 110–260)
LEUKOCYTE ESTERASE UR QL STRIP: NEGATIVE
LIPASE SERPL-CCNC: 28 U/L (ref 4–60)
LYMPHOCYTES # BLD AUTO: 1.2 K/UL (ref 1–4.8)
LYMPHOCYTES NFR BLD: 45.5 % (ref 18–48)
MCH RBC QN AUTO: 30.4 PG (ref 27–31)
MCHC RBC AUTO-ENTMCNC: 35.4 G/DL (ref 32–36)
MCV RBC AUTO: 86 FL (ref 82–98)
METHADONE UR QL SCN>300 NG/ML: NEGATIVE
MONOCYTES # BLD AUTO: 0.3 K/UL (ref 0.3–1)
MONOCYTES NFR BLD: 10.6 % (ref 4–15)
NEUTROPHILS # BLD AUTO: 1.1 K/UL (ref 1.8–7.7)
NEUTROPHILS NFR BLD: 43.5 % (ref 38–73)
NITRITE UR QL STRIP: NEGATIVE
NRBC BLD-RTO: 0 /100 WBC
OPIATES UR QL SCN: NEGATIVE
PCP UR QL SCN>25 NG/ML: NEGATIVE
PH UR STRIP: 6 [PH] (ref 5–8)
PLATELET # BLD AUTO: 208 K/UL (ref 150–350)
PMV BLD AUTO: 10.1 FL (ref 9.2–12.9)
POTASSIUM SERPL-SCNC: 3.5 MMOL/L (ref 3.5–5.1)
PROCALCITONIN SERPL IA-MCNC: 0.02 NG/ML
PROT SERPL-MCNC: 7.5 G/DL (ref 6–8.4)
PROT UR QL STRIP: ABNORMAL
RBC # BLD AUTO: 4.28 M/UL (ref 4–5.4)
SARS-COV-2 RDRP RESP QL NAA+PROBE: POSITIVE
SODIUM SERPL-SCNC: 140 MMOL/L (ref 136–145)
SP GR UR STRIP: 1.02 (ref 1–1.03)
TOXICOLOGY INFORMATION: NORMAL
TROPONIN I SERPL DL<=0.01 NG/ML-MCNC: <0.006 NG/ML (ref 0–0.03)
URN SPEC COLLECT METH UR: ABNORMAL
UROBILINOGEN UR STRIP-ACNC: 1 EU/DL
WBC # BLD AUTO: 2.55 K/UL (ref 3.9–12.7)

## 2020-10-11 PROCEDURE — U0002 COVID-19 LAB TEST NON-CDC: HCPCS

## 2020-10-11 PROCEDURE — 96365 THER/PROPH/DIAG IV INF INIT: CPT

## 2020-10-11 PROCEDURE — 82550 ASSAY OF CK (CPK): CPT

## 2020-10-11 PROCEDURE — 80307 DRUG TEST PRSMV CHEM ANLYZR: CPT

## 2020-10-11 PROCEDURE — 93005 ELECTROCARDIOGRAM TRACING: CPT

## 2020-10-11 PROCEDURE — 85025 COMPLETE CBC W/AUTO DIFF WBC: CPT

## 2020-10-11 PROCEDURE — 93010 ELECTROCARDIOGRAM REPORT: CPT | Mod: ,,, | Performed by: INTERNAL MEDICINE

## 2020-10-11 PROCEDURE — 63600175 PHARM REV CODE 636 W HCPCS: Performed by: SURGERY

## 2020-10-11 PROCEDURE — 36415 COLL VENOUS BLD VENIPUNCTURE: CPT

## 2020-10-11 PROCEDURE — 84145 PROCALCITONIN (PCT): CPT

## 2020-10-11 PROCEDURE — 83690 ASSAY OF LIPASE: CPT

## 2020-10-11 PROCEDURE — 86140 C-REACTIVE PROTEIN: CPT

## 2020-10-11 PROCEDURE — 25000003 PHARM REV CODE 250: Performed by: SURGERY

## 2020-10-11 PROCEDURE — 82553 CREATINE MB FRACTION: CPT

## 2020-10-11 PROCEDURE — 80053 COMPREHEN METABOLIC PANEL: CPT

## 2020-10-11 PROCEDURE — 81003 URINALYSIS AUTO W/O SCOPE: CPT | Mod: 59

## 2020-10-11 PROCEDURE — 84484 ASSAY OF TROPONIN QUANT: CPT

## 2020-10-11 PROCEDURE — 83615 LACTATE (LD) (LDH) ENZYME: CPT

## 2020-10-11 PROCEDURE — 82728 ASSAY OF FERRITIN: CPT

## 2020-10-11 PROCEDURE — 99285 EMERGENCY DEPT VISIT HI MDM: CPT | Mod: 25

## 2020-10-11 PROCEDURE — 85651 RBC SED RATE NONAUTOMATED: CPT

## 2020-10-11 PROCEDURE — 81025 URINE PREGNANCY TEST: CPT

## 2020-10-11 PROCEDURE — 93010 EKG 12-LEAD: ICD-10-PCS | Mod: ,,, | Performed by: INTERNAL MEDICINE

## 2020-10-11 RX ORDER — ONDANSETRON 4 MG/1
4 TABLET, ORALLY DISINTEGRATING ORAL EVERY 8 HOURS PRN
Qty: 20 TABLET | Refills: 0 | Status: ON HOLD | OUTPATIENT
Start: 2020-10-11 | End: 2022-12-06 | Stop reason: HOSPADM

## 2020-10-11 RX ORDER — ALBUTEROL SULFATE 90 UG/1
2 AEROSOL, METERED RESPIRATORY (INHALATION) EVERY 6 HOURS PRN
Qty: 0.1 G | Refills: 0 | Status: SHIPPED | OUTPATIENT
Start: 2020-10-11 | End: 2021-01-06 | Stop reason: SDUPTHER

## 2020-10-11 RX ORDER — AZITHROMYCIN 250 MG/1
TABLET, FILM COATED ORAL
Qty: 6 TABLET | Refills: 0 | OUTPATIENT
Start: 2020-10-11 | End: 2020-11-30

## 2020-10-11 RX ORDER — IBUPROFEN 800 MG/1
800 TABLET ORAL EVERY 6 HOURS PRN
Qty: 20 TABLET | Refills: 0 | Status: SHIPPED | OUTPATIENT
Start: 2020-10-11 | End: 2021-01-06 | Stop reason: SDUPTHER

## 2020-10-11 RX ORDER — PROMETHAZINE HYDROCHLORIDE 25 MG/1
25 TABLET ORAL EVERY 6 HOURS PRN
Qty: 15 TABLET | Refills: 0 | Status: ON HOLD | OUTPATIENT
Start: 2020-10-11 | End: 2022-12-06 | Stop reason: HOSPADM

## 2020-10-11 RX ORDER — METHYLPREDNISOLONE 4 MG/1
TABLET ORAL
Qty: 1 PACKAGE | Refills: 0 | OUTPATIENT
Start: 2020-10-11 | End: 2020-11-30

## 2020-10-11 RX ADMIN — SODIUM CHLORIDE 1000 ML: 0.9 INJECTION, SOLUTION INTRAVENOUS at 11:10

## 2020-10-11 RX ADMIN — PROMETHAZINE HYDROCHLORIDE 25 MG: 25 INJECTION INTRAMUSCULAR; INTRAVENOUS at 11:10

## 2020-10-11 NOTE — Clinical Note
"Epifanio "Aide Grubbs was seen and treated in our emergency department on 10/11/2020.     COVID-19 is present in our communities across the state. There is limited testing for COVID at this time, so not all patients can be tested. In this situation, your employee meets the following criteria:    Epifanio Grubbs has met the criteria for COVID-19 testing and has a POSITIVE result. She can return to work once they are asymptomatic for 72 hours without the use of fever reducing medications AND at least ten days from the first positive result.     If you have any questions or concerns, or if I can be of further assistance, please do not hesitate to contact me.    Sincerely,              RN"

## 2020-10-11 NOTE — ED PROVIDER NOTES
[facility]                                                 Chief Complaint  40 y.o. female with Weakness (weakness, nausea, and  vomiting X 3 days ) and Abdominal Pain      History of Present Illness  Epifanio Grubbs presents to the emergency room with nausea today  Pt with viral GI symptoms, abdominal cramps this morning at home per history  Pt on exam has no abdominal pain, no signs of peritonitis or acute abdomen   Pt denies any dysuria or hematuria, denies any vaginal discharge or STD risk    The history is provided by the patient   device was not used during this ER visit    Past Medical History   -- Asthma     -- Chronic pelvic pain in female     -- Fibromyalgia     -- GERD (gastroesophageal reflux disease)     -- Osteoarthritis        Past Surgical History   -- Tubal ligation       -- Appendectomy       -- Co 2 laser       -- Pelvic laparoscopy       -- Tonsillectomy       -- Adenoidectomy       -- Colonoscopy       No Known Allergies     I have reviewed all of this patient's past medical, surgical, family, and social   histories as well as active allergies and medications documented in the  electronic medical record    Review of Systems and Physical Exam      Review of Systems  -- Constitution - fatigue, weakness, no chills or fever  -- Eyes - no tearing or redness, no visual disturbance  -- Ear, Nose - no tinnitus or earache, no nasal congestion or discharge  -- Mouth,Throat - no sore throat, no toothache, normal voice, normal swallowing  -- Respiratory - denies cough and congestion, no shortness of breath, no PATEL  -- Cardiovascular - denies chest pain, no palpitations, denies claudication  -- Gastrointestinal - nausea, vomiting, diarrhea, abdominal cramps  -- Genitourinary - no dysuria, denies flank pain, no hematuria, no STD risk  -- Musculoskeletal - denies back pain, negative for trauma or injury  -- Neurological - no headache, denies weakness or  seizure; no LOC  -- Skin - denies pallor, rash, or changes in skin. no hives or welts noted    Vital Signs  Her temperature is 99.4 °F (37.4 °C).   Her blood pressure is 126/79 and her pulse is 90.   Her respiration is 20 and oxygen saturation is 99%.     Physical Exam  -- Nursing note and vitals reviewed  -- Constitutional: Appears well-developed and well-nourished  -- Head: Atraumatic. Normocephalic. No obvious abnormality  -- Eyes: Pupils are equal and reactive to light. Normal conjunctiva and lids  -- Cardiac: Normal rate, regular rhythm and normal heart sounds  -- Pulmonary: Normal respiratory effort, breath sounds clear to auscultation  -- Abdominal: Soft, no tenderness. Normal bowel sounds. Normal liver edge  -- Musculoskeletal: Normal range of motion, no effusions. Joints stable   -- Neurological: No focal deficits. Showed good interaction with staff  -- Vascular: Posterior tibial, dorsalis pedis and radial pulses 2+ bilaterally      Emergency Room Course      Lab Results     K 3.5      CO2 21 (L)   BUN 6   CREATININE 0.8      ALKPHOS 67   AST 19   ALT 31   BILITOT 0.5   ALBUMIN 3.6   PROT 7.5   WBC 2.55 (L)   HGB 13.0   HCT 36.7 (L)      CPK 61   CPK 61   CPKMB 0.2   TROPONINI <0.006     Urinalysis  -- Urinalysis performed during this ER visit showed no signs of infection  -- The urine pregnancy test today was negative; patient informed of the results      EKG  -- The EKG findings today were without concerning findings from baseline     Chest x-ray  -- Patchy bibasilar airspace consolidation, which may reflect aspiration,   -- atelectasis or pneumonia.    Additional Work up  -- rapid Coronavirus PCR was positive  -- CRP, ESR, procalcitonin, LDH and ferritin pending    Medications Given  sodium chloride 0.9% bolus 1,000 mL (0 mLs Intravenous Stopped 10/11/20 1214)   promethazine (PHENERGAN) 25 mg in dextrose 5 % 50 mL IVPB (0 mg Intravenous Stopped 10/11/20 1147)     ED Physician  Management  -- Diagnosis management comments: 40 y.o. female with COVID virus  -- The patient states that she had cramps and nausea this weekend  -- Pt positive for COVID with negative labs in the ER this late morning  -- Informed that he/she was diagnosed with COVID during the ER visit  -- Patient voiced complete understanding will self quarantine for 10 days  -- Patient will follow-up with his primary care physician within next 48 hours  -- Tylenol and Motrin as needed for fever and symptom control going forward  -- Will return to the ER with any concerning signs or symptoms of COVID    Diagnosis  [R11.0] Nausea  [U07.1] COVID-19 virus infection (Primary)    Disposition and Plan  -- Disposition: home  -- Condition: stable  -- Follow-up: Patient to follow up with Trinity Rangel NP in 1-2 days.  -- I advised the patient that we have found no life threatening condition today  -- At this time, I believe the patient is clinically stable for discharge.   -- The patient acknowledges that close follow up with a MD is required   -- Patient agrees to comply with all instruction and direction given in the ER    This note is dictated on M*Modal word recognition program.  There are word recognition mistakes that are occasionally missed on review.           Antonino Erickson MD  10/11/20 9687

## 2020-11-30 ENCOUNTER — HOSPITAL ENCOUNTER (EMERGENCY)
Facility: HOSPITAL | Age: 40
Discharge: HOME | End: 2020-11-30
Attending: SURGERY
Payer: COMMERCIAL

## 2020-11-30 VITALS
BODY MASS INDEX: 34.97 KG/M2 | WEIGHT: 209.88 LBS | OXYGEN SATURATION: 100 % | TEMPERATURE: 99 F | DIASTOLIC BLOOD PRESSURE: 86 MMHG | SYSTOLIC BLOOD PRESSURE: 129 MMHG | HEART RATE: 94 BPM | RESPIRATION RATE: 18 BRPM | HEIGHT: 65 IN

## 2020-11-30 DIAGNOSIS — J00 ACUTE NASOPHARYNGITIS: Primary | ICD-10-CM

## 2020-11-30 PROCEDURE — 96372 THER/PROPH/DIAG INJ SC/IM: CPT

## 2020-11-30 PROCEDURE — 99284 EMERGENCY DEPT VISIT MOD MDM: CPT | Mod: 25

## 2020-11-30 PROCEDURE — 63600175 PHARM REV CODE 636 W HCPCS: Performed by: SURGERY

## 2020-11-30 RX ORDER — METHYLPREDNISOLONE SOD SUCC 125 MG
125 VIAL (EA) INJECTION
Status: COMPLETED | OUTPATIENT
Start: 2020-11-30 | End: 2020-11-30

## 2020-11-30 RX ORDER — METHYLPREDNISOLONE 4 MG/1
TABLET ORAL
Qty: 1 PACKAGE | Refills: 0 | Status: SHIPPED | OUTPATIENT
Start: 2020-11-30 | End: 2021-01-06

## 2020-11-30 RX ORDER — AZITHROMYCIN 250 MG/1
TABLET, FILM COATED ORAL
Qty: 6 TABLET | Refills: 0 | Status: SHIPPED | OUTPATIENT
Start: 2020-11-30 | End: 2021-01-06

## 2020-11-30 RX ORDER — PROMETHAZINE HYDROCHLORIDE AND DEXTROMETHORPHAN HYDROBROMIDE 6.25; 15 MG/5ML; MG/5ML
5 SYRUP ORAL EVERY 6 HOURS PRN
Qty: 118 ML | Refills: 0 | Status: SHIPPED | OUTPATIENT
Start: 2020-11-30 | End: 2020-12-10

## 2020-11-30 RX ADMIN — METHYLPREDNISOLONE SODIUM SUCCINATE 125 MG: 125 INJECTION, POWDER, FOR SOLUTION INTRAMUSCULAR; INTRAVENOUS at 04:11

## 2020-11-30 NOTE — ED PROVIDER NOTES
[facility]                                                 Chief Complaint  40 y.o. female with Cough (today)      History of Present Illness  Epifanio Grubbs presents to the emergency room with cough today  Patient with clear nasal drainage with nasal mucosa erythema noted in the ER  Patient with clear lung sounds with no wheezing or sputum identified in the ER  Patient was diagnosed with COVID 2 months ago, completely recovered then  No nausea vomiting or diarrhea, 100% oxygenation on ER triage this evening    The history is provided by the patient   device was not used during this ER visit    Past Medical History   -- Asthma     -- Chronic pelvic pain in female     -- Fibromyalgia     -- GERD (gastroesophageal reflux disease)     -- Osteoarthritis        Past Surgical History   -- Tubal ligation       -- Appendectomy       -- Co 2 laser       -- Pelvic laparoscopy       -- Tonsillectomy       -- Adenoidectomy       -- Colonoscopy       No Known Allergies     I have reviewed all of this patient's past medical, surgical, family, and social   histories as well as active allergies and medications documented in the  electronic medical record    Review of Systems and Physical Exam      Review of Systems  -- Constitution - no fever, denies fatigue, no weakness, no chills  -- Eyes - no tearing or redness, no visual disturbance  -- Ear, Nose - sneezing, nasal congestion and clear discharge   -- Mouth,Throat - sore throat, no toothache, normal voice, normal swallowing  -- Respiratory - cough and congestion, no shortness of breath, no PATEL  -- Cardiovascular - denies chest pain, no palpitations, denies claudication  -- Gastrointestinal - denies abdominal pain, nausea, vomiting, or diarrhea  -- Genitourinary - no dysuria, no hematuria, no flank pain, no bladder pain  -- Musculoskeletal - denies back pain, negative for trauma or injury  -- Neurological - no headache, denies  weakness or seizure; no LOC  -- Skin - denies pallor, rash, or changes in skin. no hives or welts noted     Vital Signs  Her temperature is 98.8 °F (37.1 °C).   Her blood pressure is 129/86 and her pulse is 94.   Her respiration is 18 and oxygen saturation is 100%.     Physical Exam  -- Nursing note and vitals reviewed  -- Constitutional: Appears well-developed and well-nourished  -- Head: Atraumatic. Normocephalic. No obvious abnormality  -- Eyes: Pupils are equal and reactive to light. Normal conjunctiva and lids  -- Nose: nasal mucosa erythema and edema; clear nasal discharge noted   -- Throat: post-nasal drip with mild posterior oropharnyx erythema  -- Ears: External ears and TM normal bilaterally. Normal hearing and no drainage  -- Neck: Normal range of motion. Neck supple. No masses, trachea midline  -- Cardiac: Normal rate, regular rhythm and normal heart sounds  -- Pulmonary: Normal respiratory effort, breath sounds clear to auscultation  -- Abdominal: Soft, no tenderness. Normal bowel sounds. Normal liver edge  -- Musculoskeletal: Normal range of motion, no effusions. Joints stable   -- Neurological: No focal deficits. Showed good interaction with staff  -- Vascular: Posterior tibial, dorsalis pedis and radial pulses 2+ bilaterally       Emergency Room Course      Treatment and Evaluation  --  mg Solumedrol given today in the ER    Diagnosis  [J00] Acute nasopharyngitis (Primary)    Disposition and Plan  -- Disposition: home  -- Condition: stable  -- Follow-up: Patient to follow up with Trinity Rangel NP in 1-2 days.  -- I advised the patient that we have found no life threatening condition today  -- At this time, I believe the patient is clinically stable for discharge.   -- The patient acknowledges that close follow up with a MD is required   -- Patient agrees to comply with all instruction and direction given in the ER    This note is dictated on M*Modal word recognition program.  There are word  recognition mistakes that are occasionally missed on review.         Antonino Erickson MD  11/30/20 2851

## 2021-03-03 NOTE — ED TRIAGE NOTES
38 y.o. female presents to ER   Chief Complaint   Patient presents with    Generalized Body Aches   Pt reports fever and body aches onset today. No tylenol or motrin PTA. No acute distress noted.    
1

## 2021-05-06 ENCOUNTER — PATIENT MESSAGE (OUTPATIENT)
Dept: RESEARCH | Facility: HOSPITAL | Age: 41
End: 2021-05-06

## 2021-05-10 ENCOUNTER — PATIENT MESSAGE (OUTPATIENT)
Dept: RESEARCH | Facility: HOSPITAL | Age: 41
End: 2021-05-10

## 2021-05-17 PROBLEM — D50.0 IRON DEFICIENCY ANEMIA DUE TO CHRONIC BLOOD LOSS: Chronic | Status: ACTIVE | Noted: 2018-11-12

## 2021-06-07 ENCOUNTER — HOSPITAL ENCOUNTER (EMERGENCY)
Facility: HOSPITAL | Age: 41
Discharge: HOME | End: 2021-06-07
Attending: FAMILY MEDICINE
Payer: COMMERCIAL

## 2021-06-07 VITALS
HEART RATE: 87 BPM | WEIGHT: 209.44 LBS | DIASTOLIC BLOOD PRESSURE: 83 MMHG | OXYGEN SATURATION: 100 % | RESPIRATION RATE: 18 BRPM | SYSTOLIC BLOOD PRESSURE: 143 MMHG | TEMPERATURE: 98 F | BODY MASS INDEX: 34.85 KG/M2

## 2021-06-07 DIAGNOSIS — J45.20 MILD INTERMITTENT ASTHMA WITHOUT COMPLICATION: ICD-10-CM

## 2021-06-07 DIAGNOSIS — J06.9 UPPER RESPIRATORY TRACT INFECTION, UNSPECIFIED TYPE: Primary | ICD-10-CM

## 2021-06-07 LAB — GROUP A STREP, MOLECULAR: NEGATIVE

## 2021-06-07 PROCEDURE — 99284 EMERGENCY DEPT VISIT MOD MDM: CPT

## 2021-06-07 PROCEDURE — 87651 STREP A DNA AMP PROBE: CPT | Performed by: FAMILY MEDICINE

## 2021-06-07 RX ORDER — ALBUTEROL SULFATE 90 UG/1
2 AEROSOL, METERED RESPIRATORY (INHALATION) EVERY 6 HOURS PRN
Qty: 18 G | Refills: 0 | Status: SHIPPED | OUTPATIENT
Start: 2021-06-07 | End: 2022-10-13 | Stop reason: SDUPTHER

## 2021-06-07 RX ORDER — BROMPHENIRAMINE MALEATE, PSEUDOEPHEDRINE HYDROCHLORIDE, AND DEXTROMETHORPHAN HYDROBROMIDE 2; 30; 10 MG/5ML; MG/5ML; MG/5ML
5 SYRUP ORAL 4 TIMES DAILY PRN
Qty: 200 ML | Refills: 0 | Status: SHIPPED | OUTPATIENT
Start: 2021-06-07 | End: 2021-06-17

## 2021-06-07 RX ORDER — PREDNISONE 20 MG/1
20 TABLET ORAL DAILY
Qty: 7 TABLET | Refills: 0 | Status: SHIPPED | OUTPATIENT
Start: 2021-06-07 | End: 2021-06-14

## 2021-06-07 RX ORDER — AZITHROMYCIN 250 MG/1
250 TABLET, FILM COATED ORAL DAILY
Qty: 6 TABLET | Refills: 0 | Status: ON HOLD | OUTPATIENT
Start: 2021-06-07 | End: 2022-12-06 | Stop reason: HOSPADM

## 2022-01-07 ENCOUNTER — HOSPITAL ENCOUNTER (EMERGENCY)
Facility: HOSPITAL | Age: 42
Discharge: HOME | End: 2022-01-07
Attending: SURGERY
Payer: COMMERCIAL

## 2022-01-07 VITALS
WEIGHT: 209.44 LBS | RESPIRATION RATE: 18 BRPM | DIASTOLIC BLOOD PRESSURE: 98 MMHG | BODY MASS INDEX: 34.85 KG/M2 | SYSTOLIC BLOOD PRESSURE: 160 MMHG | HEART RATE: 88 BPM | TEMPERATURE: 100 F | OXYGEN SATURATION: 99 %

## 2022-01-07 DIAGNOSIS — B34.9 VIRAL SYNDROME: Primary | ICD-10-CM

## 2022-01-07 PROCEDURE — U0003 INFECTIOUS AGENT DETECTION BY NUCLEIC ACID (DNA OR RNA); SEVERE ACUTE RESPIRATORY SYNDROME CORONAVIRUS 2 (SARS-COV-2) (CORONAVIRUS DISEASE [COVID-19]), AMPLIFIED PROBE TECHNIQUE, MAKING USE OF HIGH THROUGHPUT TECHNOLOGIES AS DESCRIBED BY CMS-2020-01-R: HCPCS | Performed by: SURGERY

## 2022-01-07 PROCEDURE — 99284 EMERGENCY DEPT VISIT MOD MDM: CPT

## 2022-01-07 PROCEDURE — U0005 INFEC AGEN DETEC AMPLI PROBE: HCPCS | Performed by: SURGERY

## 2022-01-07 RX ORDER — CETIRIZINE HYDROCHLORIDE 10 MG/1
10 TABLET ORAL DAILY
Qty: 30 TABLET | Refills: 0 | Status: SHIPPED | OUTPATIENT
Start: 2022-01-07 | End: 2024-01-17

## 2022-01-07 RX ORDER — BENZONATATE 100 MG/1
200 CAPSULE ORAL 3 TIMES DAILY PRN
Qty: 20 CAPSULE | Refills: 0 | Status: SHIPPED | OUTPATIENT
Start: 2022-01-07 | End: 2022-01-17

## 2022-01-07 RX ORDER — METHYLPREDNISOLONE 4 MG/1
TABLET ORAL
Qty: 1 EACH | Refills: 0 | Status: ON HOLD | OUTPATIENT
Start: 2022-01-07 | End: 2022-12-06 | Stop reason: HOSPADM

## 2022-01-07 NOTE — ED TRIAGE NOTES
41 y.o. female presents to ER Room/bed info not found   Chief Complaint   Patient presents with    Generalized Body Aches   .   C/o headache, body aches, and chills for three days

## 2022-01-08 LAB
SARS-COV-2 RNA RESP QL NAA+PROBE: DETECTED
SARS-COV-2- CYCLE NUMBER: 38

## 2022-01-08 NOTE — ED PROVIDER NOTES
[facility]                                                 I reviewed the ER triage nurse's note before evaluating the patient    Chief Complaint  41 y.o. female with Generalized Body Aches    History of Present Illness  Epifanio Grubbs presents to the emergency room with nasal congestion  Patient with clear nasal drainage with nasal mucosa erythema, clear lungs now  Patient with no nausea vomiting diarrhea, does not look toxic or dehydrated now  Patient specifically is asking for a COVID test in the emergency room this afternoon    The history is provided by the patient  Previous medical records were obtained from [facility]  Previous records are summarized from prior ER visits and hospitalizations    Past Medical History   -- Asthma     -- Chronic pelvic pain in female     -- Fibromyalgia     -- GERD (gastroesophageal reflux disease)     -- Osteoarthritis        Past Surgical History   -- Tubal ligation       -- Appendectomy       -- Co 2 laser       -- Pelvic laparoscopy       -- Tonsillectomy       -- Adenoidectomy       -- Colonoscopy       No known allergies  No significant family history  No significant social history, nonsmoker    I have reviewed all of this patient's past medical, surgical, family, and social   histories as well as active allergies and medications documented in the  electronic medical record    Review of Systems and Physical Exam      Review of Systems (all other ROS are otherwise negative)  -- Constitution - subjective fever, denies fatigue, no weakness, no chills  -- Eyes - no tearing or redness, no visual disturbance  -- Ear, Nose - sneezing, nasal congestion and clear discharge   -- Mouth,Throat - sore throat, no toothache, normal voice, normal swallowing  -- Respiratory - cough and congestion, no shortness of breath, no sputum  -- Cardiovascular - denies chest pain, no palpitations, denies claudication  -- Gastrointestinal - denies abdominal pain, nausea,  vomiting, or diarrhea  -- Genitourinary - no dysuria, no hematuria, no flank pain, no bladder pain  -- Musculoskeletal - denies back pain, negative for trauma or injury  -- Neurological - no headache, denies weakness or seizure; no LOC  -- Skin - denies pallor, rash, or changes in skin. no hives or welts noted     Vital Signs (reviewed by the physician)  Her temperature is 100.4 °F (38 °C)   Her blood pressure is 160/98 and her pulse is 88.   Her respiration is 18 and oxygen saturation is 99%.     Physical Exam  -- Nursing note and vitals reviewed  -- Constitutional: Appears well-developed and well-nourished  -- Head: Atraumatic. Normocephalic. No obvious abnormality  -- Eyes: Pupils are equal and reactive to light. Normal conjunctiva and lids  -- Nose: nasal mucosa erythema and edema; clear nasal discharge noted   -- Throat: post-nasal drip with mild posterior oropharnyx erythema  -- Ears: External ears and TM normal bilaterally. Normal hearing and no drainage  -- Neck: Normal range of motion. Neck supple. No masses, trachea midline  -- Cardiac: Normal rate, regular rhythm and normal heart sounds  -- Respiratory: Normal respiratory effort, breath sounds clear to auscultation  -- Gastrointestinal: Soft, no tenderness. Normal bowel sounds. Normal liver edge  -- Musculoskeletal: Normal range of motion, no effusions. Joints stable   -- Neurological: No focal deficits. Showed good interaction with staff  -- Vascular: Posterior tibial, dorsalis pedis and radial pulses 2+ bilaterally       Emergency Room Course      Treatment and Evaluation  -- routine coronavirus PCR is currently pending    Medical Decision Making     ED Course/ED Management  -- patient acknowledges possible COVID virus diagnosis, test pending  -- instructed  to check [facility] for test results  -- counseled extensively on COVID precautions going forward on discharge  -- Tylenol and Motrin as needed for fever and symptom control going forward  --  counseled extensively to monitor pulse ox and any signs of deterioration  -- Will return to the ER with any concerning signs or symptoms of COVID     Assessment, Disposition, & Plan      Diagnosis  [B34.9] Viral syndrome (Primary)    Disposition and Plan  -- Disposition: home  -- Condition: stable  -- Follow-up: Patient to follow up with Trinity Rangel NP in 1-2 days.  -- I advised the patient that we have found no life threatening condition today  -- At this time, I believe the patient is clinically stable for discharge.   -- Pt understands that the visit today is to address immediate concerns   -- Further workup and evaluation as an outpatient may be required  -- The patient acknowledges that close follow up with a MD is required   -- Patient agrees to comply with all instruction and direction given in the ER    This note is dictated on M*Modal word recognition program.  There are word recognition mistakes that are occasionally missed on review.          Antonino Erickson MD  01/07/22 0676

## 2022-01-31 ENCOUNTER — PATIENT MESSAGE (OUTPATIENT)
Dept: ADMINISTRATIVE | Facility: HOSPITAL | Age: 42
End: 2022-01-31
Payer: COMMERCIAL

## 2022-03-07 DIAGNOSIS — Z12.31 OTHER SCREENING MAMMOGRAM: ICD-10-CM

## 2022-04-04 ENCOUNTER — PATIENT MESSAGE (OUTPATIENT)
Dept: ADMINISTRATIVE | Facility: HOSPITAL | Age: 42
End: 2022-04-04
Payer: COMMERCIAL

## 2022-07-11 ENCOUNTER — PATIENT MESSAGE (OUTPATIENT)
Dept: ADMINISTRATIVE | Facility: HOSPITAL | Age: 42
End: 2022-07-11
Payer: COMMERCIAL

## 2022-08-24 PROBLEM — N92.0 MENORRHAGIA: Status: ACTIVE | Noted: 2022-08-24

## 2022-10-03 ENCOUNTER — PATIENT MESSAGE (OUTPATIENT)
Dept: ADMINISTRATIVE | Facility: HOSPITAL | Age: 42
End: 2022-10-03
Payer: COMMERCIAL

## 2022-11-14 ENCOUNTER — HOSPITAL ENCOUNTER (EMERGENCY)
Facility: HOSPITAL | Age: 42
Discharge: HOME | End: 2022-11-14
Attending: SURGERY
Payer: COMMERCIAL

## 2022-11-14 VITALS
SYSTOLIC BLOOD PRESSURE: 143 MMHG | HEART RATE: 71 BPM | RESPIRATION RATE: 19 BRPM | DIASTOLIC BLOOD PRESSURE: 73 MMHG | WEIGHT: 203 LBS | TEMPERATURE: 97 F | OXYGEN SATURATION: 100 % | BODY MASS INDEX: 33.78 KG/M2

## 2022-11-14 DIAGNOSIS — R07.89 NON-CARDIAC CHEST PAIN: Primary | ICD-10-CM

## 2022-11-14 LAB
ALBUMIN SERPL BCP-MCNC: 3.8 G/DL (ref 3.5–5.2)
ALP SERPL-CCNC: 70 U/L (ref 55–135)
ALT SERPL W/O P-5'-P-CCNC: 14 U/L (ref 10–44)
AMPHET+METHAMPHET UR QL: NEGATIVE
ANION GAP SERPL CALC-SCNC: 8 MMOL/L (ref 8–16)
AST SERPL-CCNC: 14 U/L (ref 10–40)
B-HCG UR QL: NEGATIVE
BARBITURATES UR QL SCN>200 NG/ML: NEGATIVE
BASOPHILS # BLD AUTO: 0.02 K/UL (ref 0–0.2)
BASOPHILS NFR BLD: 0.4 % (ref 0–1.9)
BENZODIAZ UR QL SCN>200 NG/ML: NEGATIVE
BILIRUB SERPL-MCNC: 0.4 MG/DL (ref 0.1–1)
BILIRUB UR QL STRIP: NEGATIVE
BNP SERPL-MCNC: 19 PG/ML (ref 0–99)
BUN SERPL-MCNC: 6 MG/DL (ref 6–20)
BZE UR QL SCN: NEGATIVE
CALCIUM SERPL-MCNC: 9.1 MG/DL (ref 8.7–10.5)
CANNABINOIDS UR QL SCN: NEGATIVE
CHLORIDE SERPL-SCNC: 107 MMOL/L (ref 95–110)
CK SERPL-CCNC: 88 U/L (ref 20–180)
CLARITY UR: CLEAR
CO2 SERPL-SCNC: 23 MMOL/L (ref 23–29)
COLOR UR: YELLOW
CREAT SERPL-MCNC: 0.7 MG/DL (ref 0.5–1.4)
CREAT UR-MCNC: 43.6 MG/DL (ref 15–325)
D DIMER PPP IA.FEU-MCNC: 0.4 MG/L FEU
DIFFERENTIAL METHOD: ABNORMAL
EOSINOPHIL # BLD AUTO: 0.3 K/UL (ref 0–0.5)
EOSINOPHIL NFR BLD: 6.9 % (ref 0–8)
ERYTHROCYTE [DISTWIDTH] IN BLOOD BY AUTOMATED COUNT: 17.7 % (ref 11.5–14.5)
EST. GFR  (NO RACE VARIABLE): >60 ML/MIN/1.73 M^2
GLUCOSE SERPL-MCNC: 78 MG/DL (ref 70–110)
GLUCOSE UR QL STRIP: NEGATIVE
HCT VFR BLD AUTO: 35.1 % (ref 37–48.5)
HGB BLD-MCNC: 11.6 G/DL (ref 12–16)
HGB UR QL STRIP: NEGATIVE
IMM GRANULOCYTES # BLD AUTO: 0.01 K/UL (ref 0–0.04)
IMM GRANULOCYTES NFR BLD AUTO: 0.2 % (ref 0–0.5)
KETONES UR QL STRIP: ABNORMAL
LEUKOCYTE ESTERASE UR QL STRIP: NEGATIVE
LYMPHOCYTES # BLD AUTO: 1.9 K/UL (ref 1–4.8)
LYMPHOCYTES NFR BLD: 38.8 % (ref 18–48)
MCH RBC QN AUTO: 25.7 PG (ref 27–31)
MCHC RBC AUTO-ENTMCNC: 33 G/DL (ref 32–36)
MCV RBC AUTO: 78 FL (ref 82–98)
METHADONE UR QL SCN>300 NG/ML: NEGATIVE
MONOCYTES # BLD AUTO: 0.3 K/UL (ref 0.3–1)
MONOCYTES NFR BLD: 6.7 % (ref 4–15)
NEUTROPHILS # BLD AUTO: 2.3 K/UL (ref 1.8–7.7)
NEUTROPHILS NFR BLD: 47 % (ref 38–73)
NITRITE UR QL STRIP: NEGATIVE
NRBC BLD-RTO: 0 /100 WBC
OPIATES UR QL SCN: NEGATIVE
PCP UR QL SCN>25 NG/ML: NEGATIVE
PH UR STRIP: 5 [PH] (ref 5–8)
PLATELET # BLD AUTO: 277 K/UL (ref 150–450)
PMV BLD AUTO: 9.9 FL (ref 9.2–12.9)
POTASSIUM SERPL-SCNC: 3.6 MMOL/L (ref 3.5–5.1)
PROT SERPL-MCNC: 7.5 G/DL (ref 6–8.4)
PROT UR QL STRIP: NEGATIVE
RBC # BLD AUTO: 4.51 M/UL (ref 4–5.4)
SODIUM SERPL-SCNC: 138 MMOL/L (ref 136–145)
SP GR UR STRIP: <=1.005 (ref 1–1.03)
TOXICOLOGY INFORMATION: NORMAL
TROPONIN I SERPL DL<=0.01 NG/ML-MCNC: <0.006 NG/ML (ref 0–0.03)
TROPONIN I SERPL DL<=0.01 NG/ML-MCNC: <0.006 NG/ML (ref 0–0.03)
URN SPEC COLLECT METH UR: ABNORMAL
UROBILINOGEN UR STRIP-ACNC: NEGATIVE EU/DL
WBC # BLD AUTO: 4.92 K/UL (ref 3.9–12.7)

## 2022-11-14 PROCEDURE — 80307 DRUG TEST PRSMV CHEM ANLYZR: CPT | Performed by: SURGERY

## 2022-11-14 PROCEDURE — 85025 COMPLETE CBC W/AUTO DIFF WBC: CPT | Performed by: SURGERY

## 2022-11-14 PROCEDURE — 93005 ELECTROCARDIOGRAM TRACING: CPT

## 2022-11-14 PROCEDURE — 36415 COLL VENOUS BLD VENIPUNCTURE: CPT | Performed by: SURGERY

## 2022-11-14 PROCEDURE — 25000003 PHARM REV CODE 250: Performed by: SURGERY

## 2022-11-14 PROCEDURE — 93010 ELECTROCARDIOGRAM REPORT: CPT | Mod: ,,, | Performed by: INTERNAL MEDICINE

## 2022-11-14 PROCEDURE — 83880 ASSAY OF NATRIURETIC PEPTIDE: CPT | Performed by: SURGERY

## 2022-11-14 PROCEDURE — 99285 EMERGENCY DEPT VISIT HI MDM: CPT | Mod: 25

## 2022-11-14 PROCEDURE — 84484 ASSAY OF TROPONIN QUANT: CPT | Mod: 91 | Performed by: SURGERY

## 2022-11-14 PROCEDURE — 93010 EKG 12-LEAD: ICD-10-PCS | Mod: ,,, | Performed by: INTERNAL MEDICINE

## 2022-11-14 PROCEDURE — 81025 URINE PREGNANCY TEST: CPT | Performed by: SURGERY

## 2022-11-14 PROCEDURE — 85379 FIBRIN DEGRADATION QUANT: CPT | Performed by: SURGERY

## 2022-11-14 PROCEDURE — 81003 URINALYSIS AUTO W/O SCOPE: CPT | Mod: 59 | Performed by: SURGERY

## 2022-11-14 PROCEDURE — 80053 COMPREHEN METABOLIC PANEL: CPT | Performed by: SURGERY

## 2022-11-14 PROCEDURE — 82550 ASSAY OF CK (CPK): CPT | Performed by: SURGERY

## 2022-11-14 RX ORDER — MAG HYDROX/ALUMINUM HYD/SIMETH 200-200-20
30 SUSPENSION, ORAL (FINAL DOSE FORM) ORAL ONCE
Status: COMPLETED | OUTPATIENT
Start: 2022-11-14 | End: 2022-11-14

## 2022-11-14 RX ORDER — PANTOPRAZOLE SODIUM 40 MG/1
40 TABLET, DELAYED RELEASE ORAL DAILY
Qty: 30 TABLET | Refills: 0 | Status: SHIPPED | OUTPATIENT
Start: 2022-11-14 | End: 2022-12-12

## 2022-11-14 RX ORDER — LIDOCAINE HYDROCHLORIDE 20 MG/ML
15 SOLUTION OROPHARYNGEAL ONCE
Status: COMPLETED | OUTPATIENT
Start: 2022-11-14 | End: 2022-11-14

## 2022-11-14 RX ADMIN — ALUMINUM HYDROXIDE, MAGNESIUM HYDROXIDE, AND DIMETHICONE 30 ML: 200; 20; 200 SUSPENSION ORAL at 06:11

## 2022-11-14 RX ADMIN — LIDOCAINE HYDROCHLORIDE 15 ML: 20 SOLUTION ORAL; TOPICAL at 06:11

## 2022-11-15 NOTE — ED PROVIDER NOTES
"Encounter Date: 11/14/2022       History     Chief Complaint   Patient presents with    Chest Pain     Pt c/o left side chest pain, radiating to bilateral shoulders since 10am, worsening over the last hour. Pt states intermittent episodes of pain shooting through her chest to her back. Pt also reports pain to "lower throat" area. Pt took baby aspirin pta. Denies trauma or injury.      42-year-old female presents with chest pain & anxiety today  Patient also with belching earlier today, long history of GERD   Normal sinus rhythm on EKG with no ST changes noted now  Patient no obvious cardiac history on ER interview this evening  Patient ate a fatty lunch with belching & gastric issues after  Pt has had issues with delayed gastric emptying/fibromyalgia      Review of patient's allergies indicates:  No Known Allergies    Past Medical History:   Diagnosis Date    Asthma     Bulging lumbar disc 2015    Chronic pelvic pain in female     Delayed gastric emptying     Fibromyalgia     GERD (gastroesophageal reflux disease)     Lumbosacral radiculopathy     Osteoarthritis      Past Surgical History:   Procedure Laterality Date    ADENOIDECTOMY      APPENDECTOMY      laparoscopic    BACK SURGERY      infusion/lift    co 2 laser      to cervix about 5 years ago    COLONOSCOPY N/A 12/21/2016    Procedure: COLONOSCOPY;  Surgeon: Aylin García MD;  Location: [facility];  Service: Endoscopy;  Laterality: N/A;    COLPOSCOPY      LAPAROSCOPIC SALPINGO-OOPHORECTOMY Bilateral 8/23/2022    Procedure: SALPINGO-OOPHORECTOMY, LAPAROSCOPIC;  Surgeon: Izaiah Murphy MD;  Location: [facility];  Service: OB/GYN;  Laterality: Bilateral;    LAPAROSCOPIC TOTAL HYSTERECTOMY N/A 8/23/2022    Procedure: HYSTERECTOMY, TOTAL, LAPAROSCOPIC;  Surgeon: Izaiah Murphy MD;  Location: [facility];  Service: OB/GYN;  Laterality: N/A;    PELVIC LAPAROSCOPY  8/11/14    normal    TONSILLECTOMY      TUBAL LIGATION      UPPER GASTROINTESTINAL ENDOSCOPY   "     Family History   Problem Relation Age of Onset    Lupus Mother     Hypertension Father      Social History     Tobacco Use    Smoking status: Never    Smokeless tobacco: Never   Substance Use Topics    Alcohol use: No     Alcohol/week: 0.0 standard drinks    Drug use: No       Review of Systems   Constitutional: Negative.    HENT: Negative.     Eyes: Negative.    Respiratory:  Positive for chest tightness.    Cardiovascular:  Positive for chest pain.   Gastrointestinal:         (+) GERD   Genitourinary: Negative.    Musculoskeletal: Negative.    Skin: Negative.    Neurological: Negative.    Psychiatric/Behavioral: Negative.       Physical Exam     Initial Vitals [11/14/22 1620]   BP Pulse Resp Temp SpO2   137/78 73 17 96.7 °F (35.9 °C) 100 %      MAP       --         Physical Exam    Nursing note and vitals reviewed.  Constitutional: Vital signs are normal. She appears well-developed and well-nourished. She is cooperative.   HENT:   Head: Normocephalic and atraumatic.   Right Ear: External ear normal.   Left Ear: External ear normal.   Nose: Nose normal.   Mouth/Throat: Oropharynx is clear and moist.   Eyes: Conjunctivae, EOM and lids are normal. Pupils are equal, round, and reactive to light.   Neck: Trachea normal and phonation normal. Neck supple. No JVD present.   Normal range of motion.   Full passive range of motion without pain.     Cardiovascular:  Normal rate, regular rhythm, S1 normal, S2 normal, normal heart sounds, intact distal pulses and normal pulses.           Pulmonary/Chest: Effort normal and breath sounds normal.   Abdominal: Abdomen is soft and flat. Bowel sounds are normal.   Musculoskeletal:         General: Normal range of motion.      Cervical back: Full passive range of motion without pain, normal range of motion and neck supple.     Neurological: She is alert and oriented to person, place, and time. She has normal strength.   Skin: Skin is warm, dry and intact. Capillary refill takes  less than 2 seconds.       ED Course   Procedures  Labs Reviewed   CBC W/ AUTO DIFFERENTIAL - Abnormal; Notable for the following components:       Result Value    Hemoglobin 11.6 (*)     Hematocrit 35.1 (*)     MCV 78 (*)     MCH 25.7 (*)     RDW 17.7 (*)     All other components within normal limits   URINALYSIS, REFLEX TO URINE CULTURE - Abnormal; Notable for the following components:    Specific Gravity, UA <=1.005 (*)     Ketones, UA 1+ (*)     All other components within normal limits    Narrative:     Specimen Source->Urine   D DIMER, QUANTITATIVE   B-TYPE NATRIURETIC PEPTIDE   TROPONIN I   COMPREHENSIVE METABOLIC PANEL   CK   DRUG SCREEN PANEL, URINE EMERGENCY    Narrative:     Specimen Source->Urine   PREGNANCY TEST, URINE RAPID    Narrative:     Specimen Source->Urine   TROPONIN I     EKG Readings: (Independently Interpreted)   No STEMI  Normal sinus rhythm  No ectopy  Normal conduction  Normal ST segments  Normal T-wave  Normal axis  Heart rate in the 80s   ECG Results              EKG 12-lead (Final result)  Result time 11/15/22 00:19:07      Final result by Interface, Lab In [facility] (11/15/22 00:19:07)                   Narrative:    Test Reason : R07.9    Vent. Rate : 072 BPM     Atrial Rate : 072 BPM     P-R Int : 152 ms          QRS Dur : 072 ms      QT Int : 396 ms       P-R-T Axes : 014 056 040 degrees     QTc Int : 433 ms    Normal sinus rhythm  Normal ECG  When compared with ECG of 11-OCT-2020 11:31,  No significant change was found  Confirmed by Jonas Leung MD ([id number]) on 11/15/2022 12:18:58 AM    Referred By: [email or web address]   SELF           Confirmed By:Jonas Leung MD                                  Imaging Results              X-Ray Chest 1 View (Final result)  Result time 11/14/22 17:26:15      Final result by Baltazar Queen Jr., MD (11/14/22 17:26:15)                   Impression:      No acute abnormality.      Electronically signed by: Baltazar Queen MD  Date:    11/14/2022  Time:    17:26                Narrative:    EXAMINATION:  XR CHEST 1 VIEW    CLINICAL HISTORY:  CP;    TECHNIQUE:  Single frontal view of the chest was performed.    COMPARISON:  Chest of 2022    FINDINGS:  The lungs are clear, with normal appearance of pulmonary vasculature and no pleural effusion or pneumothorax.    The cardiac silhouette is normal in size. The hilar and mediastinal contours are unremarkable.    Bones are intact.                                       Medications   aluminum-magnesium hydroxide-simethicone 200-200-20 mg/5 mL suspension 30 mL (30 mLs Oral Given 22)     And   LIDOcaine HCl 2% oral solution 15 mL (15 mLs Oral Given 22)     Medical Decision Makin-year-old female presents with belching & GERD symptoms with chest pain  Differential includes GERD, angina, STEMI, non-STEMI, musculoskeletal  Differential diagnosis also includes fibromyalgia, delayed gastric emptying  (-) workup including EKG, D-dimer 2- troponins in the ER this afternoon  GI cocktail completely dissipated all of her symptoms in the ER today  Follow-up with Cardiology & GI, follow-up with PCP in the next 48 hours  Return to the ER with any concerning signs symptoms after discharge                         Clinical Impression:   Final diagnoses:  [R07.89] Non-cardiac chest pain (Primary)        ED Disposition Condition    Discharge Stable          ED Prescriptions       Medication Sig Dispense Start Date End Date Auth. Provider    pantoprazole (PROTONIX) 40 MG tablet Take 1 tablet (40 mg total) by mouth once daily. 30 tablet 2022 Antonino Erickson MD          Follow-up Information       Follow up With Specialties Details Why Contact Info    Trinity Rangel NP Family Medicine Schedule an appointment as soon as possible for a visit in 2 days   [facility] [address]  [phone]      Praveen Kelly MD Cardiology Schedule an appointment as soon as possible for a visit in 2 days   [address] [place] [address]  [phone]      Tamir Arauz MD Internal Medicine, Gastroenterology Go in 2 days  [address]  [address]  [place] [address]  [phone]                    Antonino Erickson MD  12/02/22 0826

## 2022-12-05 PROBLEM — R55 NEAR SYNCOPE: Status: ACTIVE | Noted: 2022-12-05

## 2022-12-06 PROBLEM — G25.0 ESSENTIAL TREMOR: Status: ACTIVE | Noted: 2022-12-06

## 2022-12-06 PROBLEM — R55 NEAR SYNCOPE: Status: RESOLVED | Noted: 2022-12-05 | Resolved: 2022-12-06

## 2022-12-06 NOTE — ED PROVIDER NOTES
Encounter Date: 12/27/2018       History     Chief Complaint   Patient presents with    Headache     The history is provided by the patient.   Headache    This is a recurrent problem. The current episode started in the past 7 days (reports started 12/24/2018). The problem occurs constantly. The problem has been gradually worsening. The pain is located in the right unilateral region. The pain does not radiate. The pain quality is similar to prior headaches. The quality of the pain is described as aching and throbbing. The pain is at a severity of 10/10. Associated symptoms include dizziness and photophobia. Pertinent negatives include no abdominal pain, abnormal behavior, coughing, ear pain, facial sweating, numbness, phonophobia, sinus pressure, sore throat, tingling or visual change. The symptoms are aggravated by bright light. Treatments tried: ibuprofen. The treatment provided no relief. Her past medical history is significant for migraine headaches.     Review of patient's allergies indicates:  No Known Allergies  Past Medical History:   Diagnosis Date    Asthma     Bulging lumbar disc 2015    Chronic pelvic pain in female     Delayed gastric emptying     Fibromyalgia     GERD (gastroesophageal reflux disease)     Lumbosacral radiculopathy     Osteoarthritis      Past Surgical History:   Procedure Laterality Date    ADENOIDECTOMY      APPENDECTOMY      laparoscopic    co 2 laser      to cervix about 5 years ago    COLONOSCOPY N/A 12/21/2016    Performed [facility]in [facility] at [facility] ENDO    COLPOSCOPY      ESOPHAGOGASTRODUODENOSCOPY (EGD) N/A 12/21/2016    Performed by [facility]ylin García [facility] at [facility] ENDO    LAPAROSCOPY-DIAGNOSTIC N/A 8/11/2014    Performed by Emmett Macedo MD at [facility] OR    PELVIC LAPAROSCOPY  8/11/14    normal    TONSILLECTOMY      TUBAL LIGATION      UPPER GASTROINTESTINAL ENDOSCOPY       Family History   Problem Relation Age of Onset    Lupus Mother     Hypertension  Father      Social History     Tobacco Use    Smoking status: Never Smoker    Smokeless tobacco: Never Used   Substance Use Topics    Alcohol use: No     Alcohol/week: 0.0 oz    Drug use: No     Review of Systems   Constitutional: Negative.  Negative for appetite change and fatigue.   HENT: Negative for congestion, ear discharge, ear pain, facial swelling, postnasal drip, sinus pressure and sore throat.    Eyes: Positive for photophobia.   Respiratory: Negative.  Negative for cough.    Cardiovascular: Negative.    Gastrointestinal: Negative.  Negative for abdominal pain.   Endocrine: Negative.    Genitourinary: Negative.         LMP 12/26/2018. Hx BTL   Musculoskeletal: Negative.    Skin: Negative.    Allergic/Immunologic: Negative.    Neurological: Positive for dizziness and headaches. Negative for tingling and numbness.   Hematological: Negative.    Psychiatric/Behavioral: Negative.        Physical Exam     Initial Vitals [12/27/18 1840]   BP Pulse Resp Temp SpO2   (!) 164/89 75 20 97 °F (36.1 °C) 98 %      MAP       --         Physical Exam    Nursing note and vitals reviewed.  Constitutional: She appears well-developed and well-nourished. She appears distressed.   HENT:   Head: Normocephalic and atraumatic.   Nose: Nose normal.   Eyes: Conjunctivae and EOM are normal. Pupils are equal, round, and reactive to light.   Neck: Neck supple.   Cardiovascular: Normal rate, regular rhythm, normal heart sounds and intact distal pulses.   Pulmonary/Chest: Effort normal and breath sounds normal.   Abdominal: Soft. Bowel sounds are normal.   Musculoskeletal: Normal range of motion.   Neurological: She is alert and oriented to person, place, and time. She has normal strength. No cranial nerve deficit or sensory deficit.   Skin: Skin is warm and dry.   Psychiatric: She has a normal mood and affect. Her behavior is normal. Judgment and thought content normal.         ED Course   Procedures  Labs Reviewed - No data to  display       Imaging Results          CT Head Without Contrast (Final result)  Result time 12/27/18 19:54:01    Final result by Mateusz Disla MD (12/27/18 19:54:01)                 Impression:      No acute abnormality.    All CT scans at this facility use dose modulation, iterative reconstruction, and/or weight based dosing when appropriate to reduce radiation dose to as low as reasonably achievable.      Electronically signed by: Mateusz Disla  Date:    12/27/2018  Time:    19:54             Narrative:    EXAMINATION:  CT HEAD WITHOUT CONTRAST    CLINICAL HISTORY:  headache;    TECHNIQUE:  Low dose axial CT images obtained throughout the head without intravenous contrast. Sagittal and coronal reconstructions were performed.    COMPARISON:  12/22/2016    FINDINGS:  Intracranial compartment:    Ventricles and sulci are normal in size for age without evidence of hydrocephalus. No extra-axial blood or fluid collections.    The brain parenchyma appears normal. No parenchymal mass, hemorrhage, edema or major vascular distribution infarct.    Skull/extracranial contents (limited evaluation): No fracture. Mastoid air cells and paranasal sinuses are essentially clear.                            CT reviewed with MD. CT without concerning findings.          Medications   morphine injection 4 mg (not administered)   ondansetron injection 4 mg (not administered)   ketorolac injection 60 mg (60 mg Intramuscular Given 12/27/18 1923)         Pain not relieved with Toradol. Morphine and zofran ordered for pain relief.     Discharged with rx for fiorcet.    The patient acknowledges that close follow up with medical provider is required. Instructed to follow up with PCP within 2 days. Patient was given specific return precautions. The patient agrees to comply with all instruction and directions given in the ER.               Clinical Impression:   The encounter diagnosis was Intractable migraine with status migrainosus, unspecified  migraine type.      Disposition:   Disposition: Discharged  Condition: Stable                        Kandy Teague NP  12/27/18 2018     No

## 2022-12-07 ENCOUNTER — HOSPITAL ENCOUNTER (EMERGENCY)
Facility: HOSPITAL | Age: 42
Discharge: HOME | End: 2022-12-07
Attending: SURGERY
Payer: COMMERCIAL

## 2022-12-07 VITALS
HEART RATE: 63 BPM | WEIGHT: 203 LBS | RESPIRATION RATE: 20 BRPM | TEMPERATURE: 98 F | OXYGEN SATURATION: 100 % | SYSTOLIC BLOOD PRESSURE: 139 MMHG | DIASTOLIC BLOOD PRESSURE: 81 MMHG | BODY MASS INDEX: 33.78 KG/M2

## 2022-12-07 DIAGNOSIS — R00.2 PALPITATIONS: ICD-10-CM

## 2022-12-07 DIAGNOSIS — F41.9 ANXIETY: Primary | ICD-10-CM

## 2022-12-07 DIAGNOSIS — R20.2 TINGLING: ICD-10-CM

## 2022-12-07 LAB
ALBUMIN SERPL BCP-MCNC: 3.8 G/DL (ref 3.5–5.2)
ALP SERPL-CCNC: 74 U/L (ref 55–135)
ALT SERPL W/O P-5'-P-CCNC: 16 U/L (ref 10–44)
AMPHET+METHAMPHET UR QL: NEGATIVE
ANION GAP SERPL CALC-SCNC: 10 MMOL/L (ref 8–16)
AST SERPL-CCNC: 14 U/L (ref 10–40)
BACTERIA #/AREA URNS HPF: ABNORMAL /HPF
BARBITURATES UR QL SCN>200 NG/ML: NEGATIVE
BASOPHILS # BLD AUTO: 0.02 K/UL (ref 0–0.2)
BASOPHILS NFR BLD: 0.4 % (ref 0–1.9)
BENZODIAZ UR QL SCN>200 NG/ML: NEGATIVE
BILIRUB SERPL-MCNC: 0.3 MG/DL (ref 0.1–1)
BILIRUB UR QL STRIP: NEGATIVE
BUN SERPL-MCNC: 8 MG/DL (ref 6–20)
BZE UR QL SCN: NEGATIVE
CALCIUM SERPL-MCNC: 9.5 MG/DL (ref 8.7–10.5)
CANNABINOIDS UR QL SCN: ABNORMAL
CHLORIDE SERPL-SCNC: 111 MMOL/L (ref 95–110)
CK SERPL-CCNC: 67 U/L (ref 20–180)
CLARITY UR: CLEAR
CO2 SERPL-SCNC: 20 MMOL/L (ref 23–29)
COLOR UR: YELLOW
CREAT SERPL-MCNC: 0.8 MG/DL (ref 0.5–1.4)
CREAT UR-MCNC: 104.7 MG/DL (ref 15–325)
DIFFERENTIAL METHOD: ABNORMAL
EOSINOPHIL # BLD AUTO: 0.4 K/UL (ref 0–0.5)
EOSINOPHIL NFR BLD: 7.1 % (ref 0–8)
ERYTHROCYTE [DISTWIDTH] IN BLOOD BY AUTOMATED COUNT: 17.2 % (ref 11.5–14.5)
EST. GFR  (NO RACE VARIABLE): >60 ML/MIN/1.73 M^2
ETHANOL SERPL-MCNC: <10 MG/DL
GLUCOSE SERPL-MCNC: 92 MG/DL (ref 70–110)
GLUCOSE UR QL STRIP: NEGATIVE
HCT VFR BLD AUTO: 36.6 % (ref 37–48.5)
HGB BLD-MCNC: 11.7 G/DL (ref 12–16)
HGB UR QL STRIP: NEGATIVE
IMM GRANULOCYTES # BLD AUTO: 0 K/UL (ref 0–0.04)
IMM GRANULOCYTES NFR BLD AUTO: 0 % (ref 0–0.5)
KETONES UR QL STRIP: NEGATIVE
LEUKOCYTE ESTERASE UR QL STRIP: ABNORMAL
LYMPHOCYTES # BLD AUTO: 2 K/UL (ref 1–4.8)
LYMPHOCYTES NFR BLD: 38 % (ref 18–48)
MAGNESIUM SERPL-MCNC: 1.8 MG/DL (ref 1.6–2.6)
MCH RBC QN AUTO: 25 PG (ref 27–31)
MCHC RBC AUTO-ENTMCNC: 32 G/DL (ref 32–36)
MCV RBC AUTO: 78 FL (ref 82–98)
METHADONE UR QL SCN>300 NG/ML: NEGATIVE
MICROSCOPIC COMMENT: ABNORMAL
MONOCYTES # BLD AUTO: 0.5 K/UL (ref 0.3–1)
MONOCYTES NFR BLD: 9 % (ref 4–15)
NEUTROPHILS # BLD AUTO: 2.4 K/UL (ref 1.8–7.7)
NEUTROPHILS NFR BLD: 45.5 % (ref 38–73)
NITRITE UR QL STRIP: NEGATIVE
NRBC BLD-RTO: 0 /100 WBC
OPIATES UR QL SCN: NEGATIVE
PCP UR QL SCN>25 NG/ML: NEGATIVE
PH UR STRIP: 6 [PH] (ref 5–8)
PHOSPHATE SERPL-MCNC: 3.7 MG/DL (ref 2.7–4.5)
PLATELET # BLD AUTO: 310 K/UL (ref 150–450)
PMV BLD AUTO: 9.4 FL (ref 9.2–12.9)
POTASSIUM SERPL-SCNC: 3.7 MMOL/L (ref 3.5–5.1)
PROT SERPL-MCNC: 8 G/DL (ref 6–8.4)
PROT UR QL STRIP: NEGATIVE
RBC # BLD AUTO: 4.68 M/UL (ref 4–5.4)
SODIUM SERPL-SCNC: 141 MMOL/L (ref 136–145)
SP GR UR STRIP: 1.02 (ref 1–1.03)
T4 FREE SERPL-MCNC: 0.93 NG/DL (ref 0.71–1.51)
TOXICOLOGY INFORMATION: ABNORMAL
TROPONIN I SERPL DL<=0.01 NG/ML-MCNC: <0.006 NG/ML (ref 0–0.03)
TSH SERPL DL<=0.005 MIU/L-ACNC: 0.22 UIU/ML (ref 0.4–4)
URN SPEC COLLECT METH UR: ABNORMAL
UROBILINOGEN UR STRIP-ACNC: ABNORMAL EU/DL
WBC # BLD AUTO: 5.34 K/UL (ref 3.9–12.7)
WBC #/AREA URNS HPF: 6 /HPF (ref 0–5)

## 2022-12-07 PROCEDURE — 84484 ASSAY OF TROPONIN QUANT: CPT | Performed by: SURGERY

## 2022-12-07 PROCEDURE — 83735 ASSAY OF MAGNESIUM: CPT | Performed by: SURGERY

## 2022-12-07 PROCEDURE — 84439 ASSAY OF FREE THYROXINE: CPT | Performed by: SURGERY

## 2022-12-07 PROCEDURE — 93005 ELECTROCARDIOGRAM TRACING: CPT

## 2022-12-07 PROCEDURE — 80053 COMPREHEN METABOLIC PANEL: CPT | Performed by: SURGERY

## 2022-12-07 PROCEDURE — 84443 ASSAY THYROID STIM HORMONE: CPT | Performed by: SURGERY

## 2022-12-07 PROCEDURE — 82746 ASSAY OF FOLIC ACID SERUM: CPT | Performed by: SURGERY

## 2022-12-07 PROCEDURE — 85025 COMPLETE CBC W/AUTO DIFF WBC: CPT | Performed by: SURGERY

## 2022-12-07 PROCEDURE — 84425 ASSAY OF VITAMIN B-1: CPT | Performed by: SURGERY

## 2022-12-07 PROCEDURE — 84100 ASSAY OF PHOSPHORUS: CPT | Performed by: SURGERY

## 2022-12-07 PROCEDURE — 80307 DRUG TEST PRSMV CHEM ANLYZR: CPT | Performed by: SURGERY

## 2022-12-07 PROCEDURE — 93010 ELECTROCARDIOGRAM REPORT: CPT | Mod: ,,, | Performed by: INTERNAL MEDICINE

## 2022-12-07 PROCEDURE — 99285 EMERGENCY DEPT VISIT HI MDM: CPT | Mod: 25

## 2022-12-07 PROCEDURE — 82077 ASSAY SPEC XCP UR&BREATH IA: CPT | Performed by: SURGERY

## 2022-12-07 PROCEDURE — 81000 URINALYSIS NONAUTO W/SCOPE: CPT | Mod: 59 | Performed by: SURGERY

## 2022-12-07 PROCEDURE — 82607 VITAMIN B-12: CPT | Performed by: SURGERY

## 2022-12-07 PROCEDURE — 93010 EKG 12-LEAD: ICD-10-PCS | Mod: ,,, | Performed by: INTERNAL MEDICINE

## 2022-12-07 PROCEDURE — 82550 ASSAY OF CK (CPK): CPT | Performed by: SURGERY

## 2022-12-07 RX ORDER — ESCITALOPRAM OXALATE 20 MG/1
20 TABLET ORAL DAILY
Qty: 30 TABLET | Refills: 11 | Status: SHIPPED | OUTPATIENT
Start: 2022-12-07 | End: 2022-12-12

## 2022-12-07 RX ORDER — HYDROXYZINE PAMOATE 25 MG/1
25 CAPSULE ORAL EVERY 4 HOURS PRN
Qty: 20 CAPSULE | Refills: 0 | Status: SHIPPED | OUTPATIENT
Start: 2022-12-07 | End: 2022-12-12

## 2022-12-08 LAB
FOLATE SERPL-MCNC: 18.3 NG/ML (ref 4–24)
VIT B12 SERPL-MCNC: 662 PG/ML (ref 210–950)
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
## 2022-12-12 ENCOUNTER — OFFICE VISIT (OUTPATIENT)
Dept: NEUROLOGY | Facility: CLINIC | Age: 42
End: 2022-12-12
Payer: COMMERCIAL

## 2022-12-12 ENCOUNTER — TELEPHONE (OUTPATIENT)
Dept: NEUROLOGY | Facility: CLINIC | Age: 42
End: 2022-12-12

## 2022-12-12 VITALS
HEIGHT: 65 IN | WEIGHT: 201.5 LBS | DIASTOLIC BLOOD PRESSURE: 92 MMHG | HEART RATE: 72 BPM | SYSTOLIC BLOOD PRESSURE: 132 MMHG | BODY MASS INDEX: 33.57 KG/M2 | RESPIRATION RATE: 16 BRPM

## 2022-12-12 DIAGNOSIS — R25.1 TREMOR: ICD-10-CM

## 2022-12-12 DIAGNOSIS — F44.4 FUNCTIONAL NEUROLOGICAL SYMPTOM DISORDER (CONVERSION DISORDER), WITH ABNORMAL MOVEMENT: ICD-10-CM

## 2022-12-12 DIAGNOSIS — F41.0 PANIC ATTACKS: ICD-10-CM

## 2022-12-12 DIAGNOSIS — R55 SYNCOPE, UNSPECIFIED SYNCOPE TYPE: Primary | ICD-10-CM

## 2022-12-12 PROCEDURE — 99999 PR PBB SHADOW E&M-EST. PATIENT-LVL IV: CPT | Mod: PBBFAC,,, | Performed by: PSYCHIATRY & NEUROLOGY

## 2022-12-12 PROCEDURE — 4010F ACE/ARB THERAPY RXD/TAKEN: CPT | Mod: CPTII,S$GLB,, | Performed by: PSYCHIATRY & NEUROLOGY

## 2022-12-12 PROCEDURE — 4010F PR ACE/ARB THEARPY RXD/TAKEN: ICD-10-PCS | Mod: CPTII,S$GLB,, | Performed by: PSYCHIATRY & NEUROLOGY

## 2022-12-12 PROCEDURE — 3075F PR MOST RECENT SYSTOLIC BLOOD PRESS GE 130-139MM HG: ICD-10-PCS | Mod: CPTII,S$GLB,, | Performed by: PSYCHIATRY & NEUROLOGY

## 2022-12-12 PROCEDURE — 1159F PR MEDICATION LIST DOCUMENTED IN MEDICAL RECORD: ICD-10-PCS | Mod: CPTII,S$GLB,, | Performed by: PSYCHIATRY & NEUROLOGY

## 2022-12-12 PROCEDURE — 1159F MED LIST DOCD IN RCRD: CPT | Mod: CPTII,S$GLB,, | Performed by: PSYCHIATRY & NEUROLOGY

## 2022-12-12 PROCEDURE — 3075F SYST BP GE 130 - 139MM HG: CPT | Mod: CPTII,S$GLB,, | Performed by: PSYCHIATRY & NEUROLOGY

## 2022-12-12 PROCEDURE — 1160F RVW MEDS BY RX/DR IN RCRD: CPT | Mod: CPTII,S$GLB,, | Performed by: PSYCHIATRY & NEUROLOGY

## 2022-12-12 PROCEDURE — 99204 PR OFFICE/OUTPT VISIT, NEW, LEVL IV, 45-59 MIN: ICD-10-PCS | Mod: S$GLB,,, | Performed by: PSYCHIATRY & NEUROLOGY

## 2022-12-12 PROCEDURE — 3008F BODY MASS INDEX DOCD: CPT | Mod: CPTII,S$GLB,, | Performed by: PSYCHIATRY & NEUROLOGY

## 2022-12-12 PROCEDURE — 3080F DIAST BP >= 90 MM HG: CPT | Mod: CPTII,S$GLB,, | Performed by: PSYCHIATRY & NEUROLOGY

## 2022-12-12 PROCEDURE — 1160F PR REVIEW ALL MEDS BY PRESCRIBER/CLIN PHARMACIST DOCUMENTED: ICD-10-PCS | Mod: CPTII,S$GLB,, | Performed by: PSYCHIATRY & NEUROLOGY

## 2022-12-12 PROCEDURE — 99999 PR PBB SHADOW E&M-EST. PATIENT-LVL IV: ICD-10-PCS | Mod: PBBFAC,,, | Performed by: PSYCHIATRY & NEUROLOGY

## 2022-12-12 PROCEDURE — 3008F PR BODY MASS INDEX (BMI) DOCUMENTED: ICD-10-PCS | Mod: CPTII,S$GLB,, | Performed by: PSYCHIATRY & NEUROLOGY

## 2022-12-12 PROCEDURE — 99204 OFFICE O/P NEW MOD 45 MIN: CPT | Mod: S$GLB,,, | Performed by: PSYCHIATRY & NEUROLOGY

## 2022-12-12 PROCEDURE — 3080F PR MOST RECENT DIASTOLIC BLOOD PRESSURE >= 90 MM HG: ICD-10-PCS | Mod: CPTII,S$GLB,, | Performed by: PSYCHIATRY & NEUROLOGY
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
## 2022-12-13 LAB — VIT B1 BLD-MCNC: 57 UG/L (ref 38–122)

## 2022-12-15 ENCOUNTER — CLINICAL SUPPORT (OUTPATIENT)
Dept: REHABILITATION | Facility: HOSPITAL | Age: 42
End: 2022-12-15
Payer: COMMERCIAL

## 2022-12-15 DIAGNOSIS — R55 SYNCOPE, UNSPECIFIED SYNCOPE TYPE: ICD-10-CM

## 2022-12-15 DIAGNOSIS — R73.9 HYPERGLYCEMIA: ICD-10-CM

## 2022-12-15 DIAGNOSIS — G25.0 ESSENTIAL TREMOR: ICD-10-CM

## 2022-12-15 PROCEDURE — 97530 THERAPEUTIC ACTIVITIES: CPT | Mod: PN

## 2022-12-15 PROCEDURE — 97110 THERAPEUTIC EXERCISES: CPT | Mod: PN

## 2022-12-15 PROCEDURE — 97161 PT EVAL LOW COMPLEX 20 MIN: CPT | Mod: PN
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
## 2022-12-19 ENCOUNTER — CLINICAL SUPPORT (OUTPATIENT)
Dept: REHABILITATION | Facility: HOSPITAL | Age: 42
End: 2022-12-19
Payer: COMMERCIAL

## 2022-12-19 DIAGNOSIS — G25.0 ESSENTIAL TREMOR: Primary | ICD-10-CM

## 2022-12-19 DIAGNOSIS — R55 SYNCOPE, UNSPECIFIED SYNCOPE TYPE: ICD-10-CM

## 2022-12-19 PROCEDURE — 97110 THERAPEUTIC EXERCISES: CPT | Mod: PN

## 2022-12-19 PROCEDURE — 97112 NEUROMUSCULAR REEDUCATION: CPT | Mod: PN

## 2022-12-19 NOTE — PROGRESS NOTES
"[facility]   Physical Therapy Treatment Note     Name: Epifanio Wallace  Clinic Number: [id number]    Therapy Diagnosis:   Encounter Diagnoses   Name Primary?    Essential tremor Yes    Syncope, unspecified syncope type      Physician: Bernardo Nicholson MD    Visit Date: 12/19/2022    [copy and paste header from amena here]  Visit #: 2/8  PTA Visit #: 0/5     Time In: 1100  Time Out: 1147  Total Billable Time: 47 minutes    SUBJECTIVE     Pt reports: she continues to get better. Her tremors continue to happen but  are more intermittent than before.   She was compliant with home exercise program.  Response to previous treatment: improved  Functional change: less tremors     Pain: 5/10  Location: right arms and lower legs     OBJECTIVE     Objective Measures updated at progress report unless specified.     Treatment     Epifanio received the treatments listed below:      therapeutic exercises to develop strength and endurance for 20 minutes including:  Step up on 6" step with tap of opposite foot 2 x 10 one a LE.   Lateral step up on 6" step 2 x 10 in ea direction   LE bike x 10 min at level 3, position 3       neuromuscular re-education activities to improve: Balance, Coordination, and Proprioception for 27 minutes. The following activities were included:  Side stepping 2 x 20' in ea direction without UE support   Airex foam squats with 5#  x 10, with squat with overhead press x 10   Bosu ball trunk rotations with single leg on, reaching with 5# across body 2 x 10 in ea direction  Bosu ball standing weight shifts lateral 2 x 10 in ea direction  Bosu ball standing forward reach with overhand  x 10, with supinated  x 10     therapeutic activities to improve functional performance for 0  minutes, including:      gait training to improve functional mobility and safety for 0  minutes, including:          Patient Education and Home Exercises     Home Exercises Provided and Patient " Education Provided     Education provided:   - importance of exercising, encouraged to start walking program    Written Home Exercises Provided: Patient instructed to cont prior HEP. Exercises were reviewed and Epifanio was able to demonstrate them prior to the end of the session.  Epifanio demonstrated good  understanding of the education provided. See EMR under Patient Instructions for exercises provided during therapy sessions    ASSESSMENT     Epifanio demonstrates improved motor control and knee stability. She demonstrated significant improved  strength to 60# on her right this date.     Epifanio Is progressing well towards her goals.   Pt prognosis is Good.     Pt will continue to benefit from skilled outpatient physical therapy to address the deficits listed in the problem list box on initial evaluation, provide pt/family education and to maximize pt's level of independence in the home and community environment.     Pt's spiritual, cultural and educational needs considered and pt agreeable to plan of care and goals.     Anticipated barriers to physical therapy: psych history     Goals: Short Term Goals: 3 weeks   Pt will be independent with home exercise program to improve LE strength and endurance  Pt will improve TUG score by 4 sec indicating improved functional mobility efficiency   Pt will improve 5 X STS by 2 sec indicating improved LE strength      Long Term Goals: 8 weeks   Pt will demonstrate normalized gait kinematics on a variety of surfaces > 1000'   Pt will improve TUG score to <13 sec indicating decrease fall risk.   Pt will improve 5XSTS to <12 sec indicating improved gait efficiency,     PLAN     Plan of care Certification: 12/15/2022 to 2/10/2023.     Outpatient Physical Therapy 1 times weekly for 8 weeks to include the following interventions: Gait Training, Neuromuscular Re-ed, Self Care, Therapeutic Activities, and Therapeutic Exercise.     Araceli Mann, PT, NCS

## 2022-12-23 ENCOUNTER — OFFICE VISIT (OUTPATIENT)
Dept: CARDIOLOGY | Facility: CLINIC | Age: 42
End: 2022-12-23
Payer: COMMERCIAL

## 2022-12-23 ENCOUNTER — HOSPITAL ENCOUNTER (OUTPATIENT)
Dept: PULMONOLOGY | Facility: HOSPITAL | Age: 42
Discharge: HOME | End: 2022-12-23
Attending: INTERNAL MEDICINE
Payer: COMMERCIAL

## 2022-12-23 VITALS
BODY MASS INDEX: 33.98 KG/M2 | HEART RATE: 92 BPM | HEIGHT: 65 IN | OXYGEN SATURATION: 97 % | DIASTOLIC BLOOD PRESSURE: 86 MMHG | SYSTOLIC BLOOD PRESSURE: 125 MMHG | WEIGHT: 203.94 LBS

## 2022-12-23 DIAGNOSIS — R55 SYNCOPE, UNSPECIFIED SYNCOPE TYPE: ICD-10-CM

## 2022-12-23 DIAGNOSIS — I10 PRIMARY HYPERTENSION: Primary | ICD-10-CM

## 2022-12-23 DIAGNOSIS — R00.2 PALPITATIONS: ICD-10-CM

## 2022-12-23 DIAGNOSIS — J45.20 MILD INTERMITTENT ASTHMA WITHOUT COMPLICATION: ICD-10-CM

## 2022-12-23 PROCEDURE — 3008F BODY MASS INDEX DOCD: CPT | Mod: CPTII,S$GLB,, | Performed by: INTERNAL MEDICINE

## 2022-12-23 PROCEDURE — 1160F RVW MEDS BY RX/DR IN RCRD: CPT | Mod: CPTII,S$GLB,, | Performed by: INTERNAL MEDICINE

## 2022-12-23 PROCEDURE — 99204 PR OFFICE/OUTPT VISIT, NEW, LEVL IV, 45-59 MIN: ICD-10-PCS | Mod: S$GLB,,, | Performed by: INTERNAL MEDICINE

## 2022-12-23 PROCEDURE — 93227 XTRNL ECG REC<48 HR R&I: CPT | Mod: ,,, | Performed by: INTERNAL MEDICINE

## 2022-12-23 PROCEDURE — 1159F PR MEDICATION LIST DOCUMENTED IN MEDICAL RECORD: ICD-10-PCS | Mod: CPTII,S$GLB,, | Performed by: INTERNAL MEDICINE

## 2022-12-23 PROCEDURE — 99999 PR PBB SHADOW E&M-EST. PATIENT-LVL IV: CPT | Mod: PBBFAC,,, | Performed by: INTERNAL MEDICINE

## 2022-12-23 PROCEDURE — 3079F DIAST BP 80-89 MM HG: CPT | Mod: CPTII,S$GLB,, | Performed by: INTERNAL MEDICINE

## 2022-12-23 PROCEDURE — 99999 PR PBB SHADOW E&M-EST. PATIENT-LVL IV: ICD-10-PCS | Mod: PBBFAC,,, | Performed by: INTERNAL MEDICINE

## 2022-12-23 PROCEDURE — 93227 HOLTER MONITOR - 24 HOUR (CUPID ONLY): ICD-10-PCS | Mod: ,,, | Performed by: INTERNAL MEDICINE

## 2022-12-23 PROCEDURE — 4010F PR ACE/ARB THEARPY RXD/TAKEN: ICD-10-PCS | Mod: CPTII,S$GLB,, | Performed by: INTERNAL MEDICINE

## 2022-12-23 PROCEDURE — 3008F PR BODY MASS INDEX (BMI) DOCUMENTED: ICD-10-PCS | Mod: CPTII,S$GLB,, | Performed by: INTERNAL MEDICINE

## 2022-12-23 PROCEDURE — 4010F ACE/ARB THERAPY RXD/TAKEN: CPT | Mod: CPTII,S$GLB,, | Performed by: INTERNAL MEDICINE

## 2022-12-23 PROCEDURE — 3079F PR MOST RECENT DIASTOLIC BLOOD PRESSURE 80-89 MM HG: ICD-10-PCS | Mod: CPTII,S$GLB,, | Performed by: INTERNAL MEDICINE

## 2022-12-23 PROCEDURE — 3074F SYST BP LT 130 MM HG: CPT | Mod: CPTII,S$GLB,, | Performed by: INTERNAL MEDICINE

## 2022-12-23 PROCEDURE — 1160F PR REVIEW ALL MEDS BY PRESCRIBER/CLIN PHARMACIST DOCUMENTED: ICD-10-PCS | Mod: CPTII,S$GLB,, | Performed by: INTERNAL MEDICINE

## 2022-12-23 PROCEDURE — 93226 XTRNL ECG REC<48 HR SCAN A/R: CPT

## 2022-12-23 PROCEDURE — 99204 OFFICE O/P NEW MOD 45 MIN: CPT | Mod: S$GLB,,, | Performed by: INTERNAL MEDICINE

## 2022-12-23 PROCEDURE — 3074F PR MOST RECENT SYSTOLIC BLOOD PRESSURE < 130 MM HG: ICD-10-PCS | Mod: CPTII,S$GLB,, | Performed by: INTERNAL MEDICINE

## 2022-12-23 PROCEDURE — 1159F MED LIST DOCD IN RCRD: CPT | Mod: CPTII,S$GLB,, | Performed by: INTERNAL MEDICINE

## 2022-12-23 RX ORDER — LOSARTAN POTASSIUM 25 MG/1
25 TABLET ORAL DAILY
Qty: 45 TABLET | Refills: 3
Start: 2022-12-23 | End: 2023-08-08 | Stop reason: SDUPTHER

## 2022-12-23 NOTE — PROGRESS NOTES
[facility]     Subjective:    Patient ID:  Epifanio Grubbs is a 42 y.o. female who presents for evaluation of Loss of Consciousness and Hypertension    Review of patient's allergies indicates:  No Known Allergies   She experienced a near syncopal episode while driving.  She was taken to [facility] ED.  She was admitted.  She is never had syncope before.  She has a history of anxiety.  She had sinus tachycardia while hospitalized.  Her workup for syncope was negative.  She had an echo showing normal ejection fraction on December 6th 2022.  There was no LVH, mild MR TR.    She has been fine since hospital discharge.  She was started on propranolol 40 mg 2 times daily.  She has a history of hypertension.  She is on losartan 12.5 mg per day.  She sees high readings at home intermittently.  Today's blood pressure 125/86 mm Hg.  She does complain of palpitations intermittently which is why they gave her propranolol.    Her syncopal episode occurred in the middle of the afternoon.  She had eaten that day.      Review of Systems   Constitutional: Negative for chills, decreased appetite, diaphoresis, fever, malaise/fatigue, night sweats, weight gain and weight loss.   HENT:  Negative for congestion, ear discharge, ear pain, hearing loss, hoarse voice, nosebleeds, odynophagia, sore throat, stridor and tinnitus.    Eyes:  Negative for blurred vision, discharge, double vision, pain, photophobia, redness, vision loss in left eye, vision loss in right eye, visual disturbance and visual halos.   Cardiovascular:  Positive for palpitations and syncope. Negative for chest pain, claudication, cyanosis, dyspnea on exertion, irregular heartbeat, leg swelling, near-syncope, orthopnea and paroxysmal nocturnal dyspnea.   Respiratory:  Negative for cough, hemoptysis, shortness of breath, sleep disturbances due to breathing, snoring, sputum  "production and wheezing.    Endocrine: Negative for cold intolerance, heat intolerance, polydipsia, polyphagia and polyuria.   Hematologic/Lymphatic: Negative for adenopathy and bleeding problem. Does not bruise/bleed easily.   Skin:  Negative for color change, dry skin, flushing, itching, nail changes, poor wound healing, rash, skin cancer, suspicious lesions and unusual hair distribution.   Musculoskeletal:  Positive for back pain and joint pain. Negative for arthritis, falls, gout, joint swelling, muscle cramps, muscle weakness, myalgias, neck pain and stiffness.   Gastrointestinal:  Positive for constipation. Negative for bloating, abdominal pain, anorexia, change in bowel habit, bowel incontinence, diarrhea, dysphagia, excessive appetite, flatus, heartburn, hematemesis, hematochezia, hemorrhoids, jaundice, melena, nausea and vomiting.   Genitourinary:  Negative for bladder incontinence, decreased libido, dysuria, flank pain, frequency, genital sores, hematuria, hesitancy, incomplete emptying, nocturia and urgency.   Neurological:  Negative for aphonia, brief paralysis, difficulty with concentration, disturbances in coordination, excessive daytime sleepiness, dizziness, focal weakness, headaches, light-headedness, loss of balance, numbness, paresthesias, seizures, sensory change, tremors, vertigo and weakness.   Psychiatric/Behavioral:  Negative for altered mental status, depression, hallucinations, memory loss, substance abuse, suicidal ideas and thoughts of violence. The patient is nervous/anxious. The patient does not have insomnia.    Allergic/Immunologic: Negative for hives and persistent infections.      Objective:       Vitals:    12/23/22 0914   BP: 125/86   BP Location: Left arm   Patient Position: Sitting   BP Method: Large (Automatic)   Pulse: 92   SpO2: 97%   Weight: 92.5 kg (203 lb 14.8 oz)   Height: 5' 5" (1.651 m)    Physical Exam  Constitutional:       General: She is not in acute distress.     " Appearance: She is well-developed. She is not diaphoretic.   HENT:      Head: Normocephalic and atraumatic.      Nose: Nose normal.   Eyes:      General: No scleral icterus.        Right eye: No discharge.      Conjunctiva/sclera: Conjunctivae normal.      Pupils: Pupils are equal, round, and reactive to light.   Neck:      Thyroid: No thyromegaly.      Vascular: No JVD.      Trachea: No tracheal deviation.   Cardiovascular:      Rate and Rhythm: Normal rate and regular rhythm.      Pulses:           Carotid pulses are 2+ on the right side and 2+ on the left side.       Radial pulses are 2+ on the right side and 2+ on the left side.        Dorsalis pedis pulses are 2+ on the right side and 2+ on the left side.        Posterior tibial pulses are 2+ on the right side and 2+ on the left side.      Heart sounds: Normal heart sounds. No murmur heard.    No friction rub. No gallop.   Pulmonary:      Effort: Pulmonary effort is normal. No respiratory distress.      Breath sounds: Normal breath sounds. No stridor. No wheezing or rales.   Chest:      Chest wall: No tenderness.   Abdominal:      General: Bowel sounds are normal. There is no distension.      Palpations: Abdomen is soft. There is no mass.      Tenderness: There is no abdominal tenderness. There is no guarding or rebound.   Musculoskeletal:         General: No tenderness. Normal range of motion.      Cervical back: Normal range of motion and neck supple.   Lymphadenopathy:      Cervical: No cervical adenopathy.   Skin:     General: Skin is warm and dry.      Coloration: Skin is not pale.      Findings: No erythema or rash.   Neurological:      Mental Status: She is alert and oriented to person, place, and time.      Cranial Nerves: No cranial nerve deficit.      Coordination: Coordination normal.   Psychiatric:         Behavior: Behavior normal.         Thought Content: Thought content normal.         Judgment: Judgment normal.         Assessment:       1.  Primary hypertension    2. Syncope, unspecified syncope type    3. Palpitations    4. Mild intermittent asthma without complication      Results for orders placed or performed during the hospital encounter of 12/07/22   Comprehensive Metabolic Panel   Result Value Ref Range    Sodium 141 136 - 145 mmol/L    Potassium 3.7 3.5 - 5.1 mmol/L    Chloride 111 (H) 95 - 110 mmol/L    CO2 20 (L) 23 - 29 mmol/L    Glucose 92 70 - 110 mg/dL    BUN 8 6 - 20 mg/dL    Creatinine 0.8 0.5 - 1.4 mg/dL    Calcium 9.5 8.7 - 10.5 mg/dL    Total Protein 8.0 6.0 - 8.4 g/dL    Albumin 3.8 3.5 - 5.2 g/dL    Total Bilirubin 0.3 0.1 - 1.0 mg/dL    Alkaline Phosphatase 74 55 - 135 U/L    AST 14 10 - 40 U/L    ALT 16 10 - 44 U/L    Anion Gap 10 8 - 16 mmol/L    eGFR >60 >60 mL/min/1.73 m^2   CBC Auto Differential   Result Value Ref Range    WBC 5.34 3.90 - 12.70 K/uL    RBC 4.68 4.00 - 5.40 M/uL    Hemoglobin 11.7 (L) 12.0 - 16.0 g/dL    Hematocrit 36.6 (L) 37.0 - 48.5 %    MCV 78 (L) 82 - 98 fL    MCH 25.0 (L) 27.0 - 31.0 pg    MCHC 32.0 32.0 - 36.0 g/dL    RDW 17.2 (H) 11.5 - 14.5 %    Platelets 310 150 - 450 K/uL    MPV 9.4 9.2 - 12.9 fL    Immature Granulocytes 0.0 0.0 - 0.5 %    Gran # (ANC) 2.4 1.8 - 7.7 K/uL    Immature Grans (Abs) 0.00 0.00 - 0.04 K/uL    Lymph # 2.0 1.0 - 4.8 K/uL    Mono # 0.5 0.3 - 1.0 K/uL    Eos # 0.4 0.0 - 0.5 K/uL    Baso # 0.02 0.00 - 0.20 K/uL    nRBC 0 0 /100 WBC    Gran % 45.5 38.0 - 73.0 %    Lymph % 38.0 18.0 - 48.0 %    Mono % 9.0 4.0 - 15.0 %    Eosinophil % 7.1 0.0 - 8.0 %    Basophil % 0.4 0.0 - 1.9 %    Differential Method Automated    Troponin I   Result Value Ref Range    Troponin I <0.006 0.000 - 0.026 ng/mL   CK   Result Value Ref Range    CPK 67 20 - 180 U/L   TSH   Result Value Ref Range    TSH 0.216 (L) 0.400 - 4.000 uIU/mL   Phosphorus   Result Value Ref Range    Phosphorus 3.7 2.7 - 4.5 mg/dL   Magnesium   Result Value Ref Range    Magnesium 1.8 1.6 - 2.6 mg/dL   Vitamin B1   Result Value Ref  Range    Thiamine 57 38 - 122 ug/L   Folate   Result Value Ref Range    Folate 18.3 4.0 - 24.0 ng/mL   Vitamin B12   Result Value Ref Range    Vitamin B-12 662 210 - 950 pg/mL   T4, Free   Result Value Ref Range    Free T4 0.93 0.71 - 1.51 ng/dL   Ethanol   Result Value Ref Range    Alcohol, Serum <10 <10 mg/dL   Drug screen panel, in-house   Result Value Ref Range    Benzodiazepines Negative Negative    Methadone metabolites Negative Negative    Cocaine (Metab.) Negative Negative    Opiate Scrn, Ur Negative Negative    Barbiturate Screen, Ur Negative Negative    Amphetamine Screen, Ur Negative Negative    THC Presumptive Positive (A) Negative    Phencyclidine Negative Negative    Creatinine, Urine 104.7 15.0 - 325.0 mg/dL    Toxicology Information SEE COMMENT    Urinalysis, Reflex to Urine Culture Urine, Clean Catch    Specimen: Urine   Result Value Ref Range    Specimen UA Urine, Clean Catch     Color, UA Yellow Yellow, Straw, Cece    Appearance, UA Clear Clear    pH, UA 6.0 5.0 - 8.0    Specific Gravity, UA 1.020 1.005 - 1.030    Protein, UA Negative Negative    Glucose, UA Negative Negative    Ketones, UA Negative Negative    Bilirubin (UA) Negative Negative    Occult Blood UA Negative Negative    Nitrite, UA Negative Negative    Urobilinogen, UA 2.0-3.0 (A) <2.0 EU/dL    Leukocytes, UA 2+ (A) Negative   Urinalysis Microscopic   Result Value Ref Range    WBC, UA 6 (H) 0 - 5 /hpf    Bacteria Moderate (A) None-Occ /hpf    Microscopic Comment SEE COMMENT          Current Outpatient Medications:     albuterol (PROVENTIL HFA) 90 mcg/actuation inhaler, Inhale 2 puffs into the lungs every 6 (six) hours as needed for Wheezing. Rescue, Disp: 18 g, Rfl: 0    aspirin (ECOTRIN) 81 MG EC tablet, Take 81 mg by mouth once daily., Disp: , Rfl:     cetirizine (ZYRTEC) 10 MG tablet, Take 1 tablet (10 mg total) by mouth once daily., Disp: 30 tablet, Rfl: 0    LINZESS 290 mcg Cap capsule, TAKE 1 CAPSULE(290 MCG) BY MOUTH EVERY  DAY, Disp: 90 capsule, Rfl: 3    propranoloL (INDERAL) 40 MG tablet, Take 1 tablet (40 mg total) by mouth 2 (two) times daily., Disp: 60 tablet, Rfl: 2    losartan (COZAAR) 25 MG tablet, Take 1 tablet (25 mg total) by mouth once daily., Disp: 45 tablet, Rfl: 3     Lab Results   Component Value Date    WBC 5.34 12/07/2022    RBC 4.68 12/07/2022    HGB 11.7 (L) 12/07/2022    HCT 36.6 (L) 12/07/2022    MCV 78 (L) 12/07/2022    MCH 25.0 (L) 12/07/2022    MCHC 32.0 12/07/2022    RDW 17.2 (H) 12/07/2022     12/07/2022    MPV 9.4 12/07/2022    GRAN 2.4 12/07/2022    GRAN 45.5 12/07/2022    LYMPH 2.0 12/07/2022    LYMPH 38.0 12/07/2022    MONO 0.5 12/07/2022    MONO 9.0 12/07/2022    EOS 0.4 12/07/2022    BASO 0.02 12/07/2022    EOSINOPHIL 7.1 12/07/2022    BASOPHIL 0.4 12/07/2022    MG 1.8 12/07/2022        CMP  Lab Results   Component Value Date     12/07/2022    K 3.7 12/07/2022     (H) 12/07/2022    CO2 20 (L) 12/07/2022    GLU 92 12/07/2022    BUN 8 12/07/2022    CREATININE 0.8 12/07/2022    CALCIUM 9.5 12/07/2022    PROT 8.0 12/07/2022    ALBUMIN 3.8 12/07/2022    BILITOT 0.3 12/07/2022    ALKPHOS 74 12/07/2022    AST 14 12/07/2022    ALT 16 12/07/2022    ANIONGAP 10 12/07/2022    ESTGFRAFRICA >60 10/11/2020    EGFRNONAA >60 10/11/2020        Lab Results   Component Value Date    LABURIN No growth 08/16/2016            Results for orders placed or performed during the hospital encounter of 12/07/22   EKG 12-lead    Collection Time: 12/07/22  3:35 PM    Narrative    Test Reason : R00.2,    Vent. Rate : 067 BPM     Atrial Rate : 067 BPM     P-R Int : 156 ms          QRS Dur : 064 ms      QT Int : 402 ms       P-R-T Axes : 020 079 048 degrees     QTc Int : 424 ms    Normal sinus rhythm  Normal ECG  When compared with ECG of 05-DEC-2022 19:56,  No significant change was found  Confirmed by Aysha Hwang MD ([id number]) on 12/7/2022 3:51:36 PM    Referred By: [email or web address]   SELF           Confirmed By:Aysha  Eri PATEL        X-Ray Chest 1 View 12/07/2022 [id number] Final   MRI Brain Without Contrast 12/07/2022 [id number] Final   CT Head Without Contrast 12/06/2022 [id number] Final            Plan:       Problem List Items Addressed This Visit          Pulmonary    Mild intermittent asthma without complication     She uses an inhaler.  No recent exacerbation of symptoms.            Cardiac/Vascular    Primary hypertension - Primary     She was recently prescribed Inderal 40 mg and she has been taking it twice daily.  Losartan 25 mg added for better blood pressure control.  She has been monitoring at home and reports elevated readings.  Condition not ideally controlled.  Blood pressure today 125/85 mm Hg.         Relevant Medications    losartan (COZAAR) 25 MG tablet    Palpitations     Inderal to continue.  Holter monitor ordered.  I do not think her syncopal episode was related to arrhythmia.         Relevant Orders    Holter monitor - 24 hour       Other    Syncope     A Holter monitor is ordered.  She has had no recurrence and feels fine at this time.                 A Holter is ordered.  I will see her in 4 months.  Reassurance provided.  I think it was a vasovagal event.    Thank you for allowing me to participate in your patient's care.         Jonas Webber MD  12/24/2022   9:52 AM

## 2022-12-24 PROBLEM — I10 PRIMARY HYPERTENSION: Status: ACTIVE | Noted: 2022-12-24

## 2022-12-24 PROBLEM — R00.2 PALPITATIONS: Status: ACTIVE | Noted: 2022-12-24

## 2022-12-24 PROBLEM — J45.20 MILD INTERMITTENT ASTHMA WITHOUT COMPLICATION: Status: ACTIVE | Noted: 2022-12-24

## 2022-12-24 NOTE — ASSESSMENT & PLAN NOTE
She was recently prescribed Inderal 40 mg and she has been taking it twice daily.  Losartan 25 mg added for better blood pressure control.  She has been monitoring at home and reports elevated readings.  Condition not ideally controlled.  Blood pressure today 125/85 mm Hg.

## 2022-12-24 NOTE — ASSESSMENT & PLAN NOTE
Inderal to continue.  Holter monitor ordered.  I do not think her syncopal episode was related to arrhythmia.

## 2022-12-29 ENCOUNTER — CLINICAL SUPPORT (OUTPATIENT)
Dept: REHABILITATION | Facility: HOSPITAL | Age: 42
End: 2022-12-29
Payer: COMMERCIAL

## 2022-12-29 DIAGNOSIS — R55 SYNCOPE, UNSPECIFIED SYNCOPE TYPE: ICD-10-CM

## 2022-12-29 DIAGNOSIS — G25.0 ESSENTIAL TREMOR: Primary | ICD-10-CM

## 2022-12-29 PROCEDURE — 97530 THERAPEUTIC ACTIVITIES: CPT | Mod: PN

## 2022-12-29 PROCEDURE — 97112 NEUROMUSCULAR REEDUCATION: CPT | Mod: PN

## 2022-12-29 NOTE — PROGRESS NOTES
"[facility]   Physical Therapy Treatment Note     Name: Epifanio Garcia [facility]  Clinic Number: [id number]    Therapy Diagnosis:   Encounter Diagnoses   Name Primary?    Essential tremor Yes    Syncope, unspecified syncope type        Physician: Bernardo Nicholson MD    Visit Date: 12/29/2022    [copy and paste header from amena here]  Visit #: 3/8  PTA Visit #: 0/5     Time In: 1110  Time Out: 1156  Total Billable Time: 46 minutes    SUBJECTIVE     Pt reports: she continues to get better. Her tremors are becoming less and less. She reports doing 2 sets a day and is more active in her home.   She was compliant with home exercise program.  Response to previous treatment: improved strength and coordination   Functional change: more active in home, plan to start tracking steps     Pain: 2/10  Location: right arms and lower legs     OBJECTIVE     Objective Measures updated at progress report unless specified.     Treatment     Epifanio received the treatments listed below:      therapeutic exercises to develop strength and endurance for 20 minutes including:  Tap ups to 6" step 2 x 15 on ea LE with blue theraband   Back step downs on 6" step with blue theraband 2 x 10 on ea LE   Shuttle leg press with 2 bands x 10   Shuttle power press with 1 bands 2 x 10 for plyo  LE bike x 10 min at level 3, position 3 1.97mi  Not performed on this date:   Step up on 6" step with tap of opposite foot 2 x 10 one a LE.   Lateral step up on 6" step 2 x 10 in ea direction     neuromuscular re-education activities to improve: Balance, Coordination, and Proprioception for 26 minutes. The following activities were included:  Side stepping 3 x 20' in ea direction without UE support with blue theraband   Wide walking with blue theraband forward/backward 2 x 20' in ea direction   Bosu ball squats with 2 x 15, with squat with overhead press x 10   Bosu ball trunk rotations with single leg on, reaching with 5# across body " 2 x 15 in ea direction  Bosu ball standing weight shifts lateral 2 x 10 in ea direction  Bosu ball standing forward reach with overhand  x 15, with supinated  x 15    therapeutic activities to improve functional performance for 0  minutes, including:      gait training to improve functional mobility and safety for 0  minutes, including:          Patient Education and Home Exercises     Home Exercises Provided and Patient Education Provided     Education provided:   - importance of exercising, encouraged to start walking program aim for 6000 steps/day     Written Home Exercises Provided: Patient instructed to cont prior HEP. Exercises were reviewed and Epifanio was able to demonstrate them prior to the end of the session.  Epifanio demonstrated good  understanding of the education provided. See EMR under Patient Instructions for exercises provided during therapy sessions    ASSESSMENT     Epifanio demonstrates improved motor control and knee stability. Some tremoring was elicited with bosu squats but did not persist with any other activities and she was still able to complete those activities. She continues to make progress and seems open to starting exercise program.     Epifanio Is progressing well towards her goals.   Pt prognosis is Good.     Pt will continue to benefit from skilled outpatient physical therapy to address the deficits listed in the problem list box on initial evaluation, provide pt/family education and to maximize pt's level of independence in the home and community environment.     Pt's spiritual, cultural and educational needs considered and pt agreeable to plan of care and goals.     Anticipated barriers to physical therapy: psych history    Goals: Short Term Goals: 3 weeks   Pt will be independent with home exercise program to improve LE strength and endurance  Pt will improve TUG score by 4 sec indicating improved functional mobility efficiency   Pt will improve 5 X STS by 2 sec  indicating improved LE strength      Long Term Goals: 8 weeks   Pt will demonstrate normalized gait kinematics on a variety of surfaces > 1000'   Pt will improve TUG score to <13 sec indicating decrease fall risk.   Pt will improve 5XSTS to <12 sec indicating improved gait efficiency,     PLAN     Plan of care Certification: 12/15/2022 to 2/10/2023.     Outpatient Physical Therapy 1 times weekly for 8 weeks to include the following interventions: Gait Training, Neuromuscular Re-ed, Self Care, Therapeutic Activities, and Therapeutic Exercise.     Araceli Mann, PT, NCS

## 2023-01-05 ENCOUNTER — HOSPITAL ENCOUNTER (OUTPATIENT)
Dept: NEUROLOGY | Facility: HOSPITAL | Age: 43
Discharge: HOME | End: 2023-01-05
Attending: PSYCHIATRY & NEUROLOGY
Payer: COMMERCIAL

## 2023-01-05 ENCOUNTER — CLINICAL SUPPORT (OUTPATIENT)
Dept: REHABILITATION | Facility: HOSPITAL | Age: 43
End: 2023-01-05
Payer: COMMERCIAL

## 2023-01-05 DIAGNOSIS — R55 SYNCOPE, UNSPECIFIED SYNCOPE TYPE: ICD-10-CM

## 2023-01-05 DIAGNOSIS — G25.0 ESSENTIAL TREMOR: Primary | ICD-10-CM

## 2023-01-05 DIAGNOSIS — R55 SYNCOPE AND COLLAPSE: Primary | ICD-10-CM

## 2023-01-05 PROCEDURE — 97112 NEUROMUSCULAR REEDUCATION: CPT | Mod: PN

## 2023-01-05 PROCEDURE — 95819 EEG AWAKE AND ASLEEP: CPT

## 2023-01-05 PROCEDURE — 97530 THERAPEUTIC ACTIVITIES: CPT | Mod: PN

## 2023-01-05 PROCEDURE — 97164 PT RE-EVAL EST PLAN CARE: CPT | Mod: PN

## 2023-01-05 NOTE — PROGRESS NOTES
"[facility]   Physical Therapy Treatment Note     Name: Epifanio Garcia [facility]  Clinic Number: [id number]    Therapy Diagnosis:   Encounter Diagnoses   Name Primary?    Essential tremor Yes    Syncope, unspecified syncope type        Physician: Bernardo Nicholson MD    Visit Date: 1/5/2023    [copy and paste header from amena here]  Visit #: 4/8  PTA Visit #: 0/5     Time In: 1305  Time Out: 1350  Total Billable Time: 45 minutes    SUBJECTIVE     Pt reports: continues to progress, she states her pain has increased over the last couple days in arm and legs.   She was compliant with home exercise program.  Response to previous treatment: improved strength and coordination   Functional change: more active in home, plan to start tracking steps     Pain: 4/10  Location: right arms and lower legs     OBJECTIVE     Gait     Speed: improving               Pattern: antalgic with inconsistent R knee stability noted              Device: none              Assistance: independent     Posture: weight shifted to left side, maintains R hand supinated and R leg extended to side in sitting     Strength difficult to assess with inconsistent firing noted, with straight leg raise and side leg raise on right, she was not able to get more than 25% height but was able to hold for a prolonged period of time       Right Left Right Left     Strength Strength AROM/PROM AROM/PROM   Hip flexion 3+/5 5/5 Noted "temors" quad misfiring Within Normal Limits throughout    Extension 4+/5 5/5       Abduction 4/5 5/5       Adduction 5/5 5/5       Knee ext 4/5 5/5       Knee flexion 4/5 5/5       DF 3/5 5/5       PF 4/5 5/5        UE: 4/5 at R shoulder,  strength 45PP on RUE,         Balance/Proprioception:  Single leg stance:     R:  5 sec          L:  20sec  Tandem stance:        30 sec         Sensation: diminished throughout      Coordination:   Test Left Right Comment   Heel<>Shin 8.4sec  PN12.2 29sec  PN 18.09se  " "improved speed and targeting, grading continues to be an issue   Rapid Movements - ** Difficulty maintaining speed with R ankle over 10 sec durration      Special Tests:   TU.3 sec; 18 sec   PN: 10.7, 9.4  5 time sit to stand: 27sec  PN: 13.5  Treatment     Epifanio received the treatments listed below:      Re-assessment performed for 20 min     therapeutic exercises to develop strength and endurance for 10 minutes including:  LE bike x 10 min at level 3, position 3 1.76mi  Not performed on this date:   Tap ups to 6" step 2 x 15 on ea LE with blue theraband   Back step downs on 6" step with blue theraband 2 x 10 on ea LE   Shuttle leg press with 2 bands x 10   Shuttle power press with 1 bands 2 x 10 for plyoStep up on 6" step with tap of opposite foot 2 x 10 one a LE.   Lateral step up on 6" step 2 x 10 in ea direction     neuromuscular re-education activities to improve: Balance, Coordination, and Proprioception for 15 minutes. The following activities were included:  Towel slides lateral 2 x 10 on ea LE  Step up on 6" step with R LE only to improve muscle firing 2 x 10   Wide walking with 8# forward/backward 2 x 20' in ea direction   Not performed on this date:  Side stepping 3 x 20' in ea direction without UE support with blue theraband   Bosu ball squats with 2 x 15, with squat with overhead press x 10   Bosu ball trunk rotations with single leg on, reaching with 5# across body 2 x 15 in ea direction  Bosu ball standing weight shifts lateral 2 x 10 in ea direction  Bosu ball standing forward reach with overhand  x 15, with supinated  x 15    therapeutic activities to improve functional performance for 0  minutes, including:      gait training to improve functional mobility and safety for 0  minutes, including:          Patient Education and Home Exercises     Home Exercises Provided and Patient Education Provided     Education provided:   - importance of exercising, encouraged to start walking " program aim for 6000 steps/day   - adding towel slides to home exercise program     Written Home Exercises Provided: Patient instructed to cont prior HEP. Exercises were reviewed and Epifanio was able to demonstrate them prior to the end of the session.  Epifanio demonstrated good  understanding of the education provided. See EMR under Patient Instructions for exercises provided during therapy sessions    ASSESSMENT     Epifanio demonstrates improved motor control and knee stability. She has met all short term goals. Tremoring of her right quad can still be elicited during straight leg raise and single leg stance activity. Her symptoms are steadily improving but still present. Her strength is significantly better, but has trouble maintaining muscle firing on her right side.     Epifanio Is progressing well towards her goals.   Pt prognosis is Good.     Pt will continue to benefit from skilled outpatient physical therapy to address the deficits listed in the problem list box on initial evaluation, provide pt/family education and to maximize pt's level of independence in the home and community environment.     Pt's spiritual, cultural and educational needs considered and pt agreeable to plan of care and goals.     Anticipated barriers to physical therapy: psych history    Goals: Short Term Goals: 3 weeks   Pt will be independent with home exercise program to improve LE strength and endurance GOAL MET  Pt will improve TUG score by 4 sec indicating improved functional mobility efficiency GOAL MET  Pt will improve 5 X STS by 2 sec indicating improved LE strength GOAL MET     Long Term Goals: 8 weeks   Pt will demonstrate normalized gait kinematics on a variety of surfaces > 1000'   Pt will improve TUG score to <13 sec indicating decrease fall risk.   Pt will improve 5XSTS to <12 sec indicating improved gait efficiency,     PLAN     Plan of care Certification: 12/15/2022 to 2/10/2023.     Outpatient Physical Therapy 1  times weekly for 8 weeks to include the following interventions: Gait Training, Neuromuscular Re-ed, Self Care, Therapeutic Activities, and Therapeutic Exercise.     Araceli Mann, PT, NCS

## 2023-01-06 ENCOUNTER — PATIENT MESSAGE (OUTPATIENT)
Dept: CARDIOLOGY | Facility: CLINIC | Age: 43
End: 2023-01-06
Payer: COMMERCIAL

## 2023-01-06 ENCOUNTER — PATIENT MESSAGE (OUTPATIENT)
Dept: CARDIOLOGY | Facility: HOSPITAL | Age: 43
End: 2023-01-06
Payer: COMMERCIAL

## 2023-01-06 PROBLEM — R55 SYNCOPE AND COLLAPSE: Status: ACTIVE | Noted: 2023-01-06

## 2023-01-06 LAB
OHS CV EVENT MONITOR DAY: 0
OHS CV HOLTER LENGTH DECIMAL HOURS: 24
OHS CV HOLTER LENGTH HOURS: 24
OHS CV HOLTER LENGTH MINUTES: 0
OHS CV HOLTER SINUS AVERAGE HR: 97
OHS CV HOLTER SINUS MAX HR: 152
OHS CV HOLTER SINUS MIN HR: 71

## 2023-01-12 ENCOUNTER — TELEPHONE (OUTPATIENT)
Dept: NEUROLOGY | Facility: CLINIC | Age: 43
End: 2023-01-12
Payer: COMMERCIAL

## 2023-01-26 ENCOUNTER — CLINICAL SUPPORT (OUTPATIENT)
Dept: REHABILITATION | Facility: HOSPITAL | Age: 43
End: 2023-01-26
Attending: INTERNAL MEDICINE
Payer: COMMERCIAL

## 2023-01-26 DIAGNOSIS — R55 SYNCOPE, UNSPECIFIED SYNCOPE TYPE: ICD-10-CM

## 2023-01-26 DIAGNOSIS — G25.0 ESSENTIAL TREMOR: Primary | ICD-10-CM

## 2023-01-26 PROCEDURE — 97110 THERAPEUTIC EXERCISES: CPT | Mod: PN

## 2023-01-26 PROCEDURE — 97112 NEUROMUSCULAR REEDUCATION: CPT | Mod: PN

## 2023-01-26 NOTE — PROGRESS NOTES
"[facility]  Physical Therapy Discharge Note    Name: Epifanio Garcia [facility]  Clinic Number: [id number]    Therapy Diagnosis:   Encounter Diagnoses   Name Primary?    Essential tremor Yes    Syncope, unspecified syncope type      Physician: Bernardo Nicholson MD    Physician Orders: eval and treat   Medical Diagnosis: syncope  Evaluation Date: 12/15/2023      Date of Last visit: 23  Total Visits Received: 5    ASSESSMENT        Gait     Speed: 10 m walk = 1.0m/s at self selected   1.15m/s at fast paced               Pattern: antalgic with inconsistent R knee stability noted              Device: none              Assistance: independent     Posture: weight shifted to left side, maintains R hand supinated and R leg extended to side in sitting     Strength difficult to assess with inconsistent firing noted, with straight leg raise and side leg raise on right, she was not able to get more than 25% height but was able to hold for a prolonged period of time       Right Left Right Left     Strength Strength AROM/PROM AROM/PROM   Hip flexion 4+/5 5/5 Noted "temors" quad misfiring Within Normal Limits throughout    Extension 4+/5 5/5       Abduction 4/5 5/5       Adduction 5/5 5/5       Knee ext 4+/5 5/5       Knee flexion 4+/5 5/5       DF 4+/5 5/5       PF 4+/5 5/5        UE: 4/5 at R shoulder,  strength 60 PP on RUE,         Balance/Proprioception:  Single leg stance:     R:  22 sec          L:  30 s ec  Tandem stance:        30 sec         Sensation: diminished throughout      Coordination:   Test Left Right Comment   Heel<>Shin 8.4sec  PN12.2 29sec  PN 18.09se  improved speed and targeting, grading continues to be an issue   Rapid Movements - ** Difficulty maintaining speed with R ankle over 10 sec durration      Special Tests:   TU.3 sec; 18 sec    PN: 10.7, 9.4  5 time sit to stand: 27sec   PN: 13.5  : 12.2 sec  Treatment       Discharge reason: Patient has met all of " his/her goals. Epifanio has made significant improvement with all her functional mobility and Upper extremity/ Lower Extremity strength. She continues to have some misfiring in her quad with single leg support and open chain activities, but has significant improvement in control and even able to perform single limb support in right for > 15 sec. Based on her mobility, once she is cleared her intentions are to return to work, which should happen in the next week. Therefore, we are discharging at this time.     Discharge FOTO Score: n/a    Goals: Short Term Goals: 3 weeks   Pt will be independent with home exercise program to improve LE strength and endurance  MET  Pt will improve TUG score by 4 sec indicating improved functional mobility efficiency MET  Pt will improve 5 X STS by 2 sec indicating improved LE strength MET     Long Term Goals: 8 weeks   Pt will demonstrate normalized gait kinematics on a variety of surfaces > 1000' MET  Pt will improve TUG score to <13 sec indicating decrease fall risk. MET  Pt will improve 5XSTS to <12 sec indicating improved gait efficiency, MET    PLAN   This patient is discharged from Physical Therapy      Araceli Mann, PT

## 2023-02-09 ENCOUNTER — OFFICE VISIT (OUTPATIENT)
Dept: NEUROLOGY | Facility: CLINIC | Age: 43
End: 2023-02-09
Payer: COMMERCIAL

## 2023-02-09 VITALS
WEIGHT: 205.94 LBS | SYSTOLIC BLOOD PRESSURE: 142 MMHG | DIASTOLIC BLOOD PRESSURE: 94 MMHG | RESPIRATION RATE: 16 BRPM | HEART RATE: 86 BPM | BODY MASS INDEX: 34.31 KG/M2 | HEIGHT: 65 IN

## 2023-02-09 DIAGNOSIS — R55 SYNCOPE, UNSPECIFIED SYNCOPE TYPE: ICD-10-CM

## 2023-02-09 DIAGNOSIS — F44.4 FUNCTIONAL NEUROLOGICAL SYMPTOM DISORDER (CONVERSION DISORDER), WITH ABNORMAL MOVEMENT: Primary | ICD-10-CM

## 2023-02-09 DIAGNOSIS — F41.0 PANIC ATTACKS: ICD-10-CM

## 2023-02-09 PROCEDURE — 3080F PR MOST RECENT DIASTOLIC BLOOD PRESSURE >= 90 MM HG: ICD-10-PCS | Mod: CPTII,S$GLB,, | Performed by: PSYCHIATRY & NEUROLOGY

## 2023-02-09 PROCEDURE — 3077F SYST BP >= 140 MM HG: CPT | Mod: CPTII,S$GLB,, | Performed by: PSYCHIATRY & NEUROLOGY

## 2023-02-09 PROCEDURE — 99214 PR OFFICE/OUTPT VISIT, EST, LEVL IV, 30-39 MIN: ICD-10-PCS | Mod: S$GLB,,, | Performed by: PSYCHIATRY & NEUROLOGY

## 2023-02-09 PROCEDURE — 1159F PR MEDICATION LIST DOCUMENTED IN MEDICAL RECORD: ICD-10-PCS | Mod: CPTII,S$GLB,, | Performed by: PSYCHIATRY & NEUROLOGY

## 2023-02-09 PROCEDURE — 3077F PR MOST RECENT SYSTOLIC BLOOD PRESSURE >= 140 MM HG: ICD-10-PCS | Mod: CPTII,S$GLB,, | Performed by: PSYCHIATRY & NEUROLOGY

## 2023-02-09 PROCEDURE — 1159F MED LIST DOCD IN RCRD: CPT | Mod: CPTII,S$GLB,, | Performed by: PSYCHIATRY & NEUROLOGY

## 2023-02-09 PROCEDURE — 99999 PR PBB SHADOW E&M-EST. PATIENT-LVL IV: CPT | Mod: PBBFAC,,, | Performed by: PSYCHIATRY & NEUROLOGY

## 2023-02-09 PROCEDURE — 3008F BODY MASS INDEX DOCD: CPT | Mod: CPTII,S$GLB,, | Performed by: PSYCHIATRY & NEUROLOGY

## 2023-02-09 PROCEDURE — 99214 OFFICE O/P EST MOD 30 MIN: CPT | Mod: S$GLB,,, | Performed by: PSYCHIATRY & NEUROLOGY

## 2023-02-09 PROCEDURE — 3080F DIAST BP >= 90 MM HG: CPT | Mod: CPTII,S$GLB,, | Performed by: PSYCHIATRY & NEUROLOGY

## 2023-02-09 PROCEDURE — 3008F PR BODY MASS INDEX (BMI) DOCUMENTED: ICD-10-PCS | Mod: CPTII,S$GLB,, | Performed by: PSYCHIATRY & NEUROLOGY

## 2023-02-09 PROCEDURE — 99999 PR PBB SHADOW E&M-EST. PATIENT-LVL IV: ICD-10-PCS | Mod: PBBFAC,,, | Performed by: PSYCHIATRY & NEUROLOGY
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
## 2023-04-03 ENCOUNTER — HOSPITAL ENCOUNTER (EMERGENCY)
Facility: HOSPITAL | Age: 43
Discharge: HOME | End: 2023-04-03
Attending: STUDENT IN AN ORGANIZED HEALTH CARE EDUCATION/TRAINING PROGRAM
Payer: COMMERCIAL

## 2023-04-03 VITALS
SYSTOLIC BLOOD PRESSURE: 131 MMHG | HEART RATE: 82 BPM | WEIGHT: 207.88 LBS | OXYGEN SATURATION: 96 % | RESPIRATION RATE: 18 BRPM | DIASTOLIC BLOOD PRESSURE: 97 MMHG | BODY MASS INDEX: 34.6 KG/M2 | TEMPERATURE: 98 F
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
## 2023-04-03 DIAGNOSIS — S63.502A WRIST SPRAIN, LEFT, INITIAL ENCOUNTER: Primary | ICD-10-CM

## 2023-04-03 PROCEDURE — 63600175 PHARM REV CODE 636 W HCPCS: Performed by: NURSE PRACTITIONER

## 2023-04-03 PROCEDURE — 99284 EMERGENCY DEPT VISIT MOD MDM: CPT

## 2023-04-03 PROCEDURE — 96372 THER/PROPH/DIAG INJ SC/IM: CPT | Performed by: NURSE PRACTITIONER

## 2023-04-03 RX ORDER — KETOROLAC TROMETHAMINE 30 MG/ML
15 INJECTION, SOLUTION INTRAMUSCULAR; INTRAVENOUS
Status: COMPLETED | OUTPATIENT
Start: 2023-04-03 | End: 2023-04-03

## 2023-04-03 RX ORDER — KETOROLAC TROMETHAMINE 10 MG/1
10 TABLET, FILM COATED ORAL EVERY 6 HOURS
Qty: 20 TABLET | Refills: 0 | Status: SHIPPED | OUTPATIENT
Start: 2023-04-03 | End: 2023-04-08

## 2023-04-03 RX ADMIN — KETOROLAC TROMETHAMINE 15 MG: 30 INJECTION, SOLUTION INTRAMUSCULAR; INTRAVENOUS at 03:04

## 2023-04-03 NOTE — ED PROVIDER NOTES
Encounter Date: 4/3/2023       History     Chief Complaint   Patient presents with    Wrist Injury     Patient to ER CC of pain to her left wrist and right ankle after stepping in a hole and falling 4 days ago, denies LOC, denies blood thinners      Chief complaint:  Left wrist and right ankle pain  42-year-old female with a history of asthma fibromyalgia GERD back pain osteoarthritis presents to be evaluated after she tripped after stepping in a hole and landing on her left wrist 4 days prior.  Also reports intermittent pain her right ankle.  Currently states she has 8/10 deep aching pain exacerbated with touch motion has alleviating factors.  Patient able to ambulate and bear weight on her ankle without significant difficulty or pain.  Patient states pain is more severe to the wrist.  She is able to flex and extend her wrist but she does have significant pain with movement.  Denies numbness or tingling she has intact sensation distally.  She is not taking any medications for symptoms    Review of patient's allergies indicates:  No Known Allergies  Past Medical History:   Diagnosis Date    Asthma     Bulging lumbar disc 2015    Chronic pelvic pain in female     Delayed gastric emptying     Fibromyalgia     GERD (gastroesophageal reflux disease)     Lumbosacral radiculopathy     Osteoarthritis      Past Surgical History:   Procedure Laterality Date    ADENOIDECTOMY      APPENDECTOMY      laparoscopic    BACK SURGERY      infusion/lift    co 2 laser      to cervix about 5 years ago    COLONOSCOPY N/A 12/21/2016    Procedure: COLONOSCOPY;  Surgeon: Aylin García MD;  Location: [facility];  Service: Endoscopy;  Laterality: N/A;    COLPOSCOPY      HYSTERECTOMY      LAPAROSCOPIC SALPINGO-OOPHORECTOMY Bilateral 08/23/2022    Procedure: SALPINGO-OOPHORECTOMY, LAPAROSCOPIC;  Surgeon: Izaiah Murphy MD;  Location: [facility];  Service: OB/GYN;  Laterality: Bilateral;    LAPAROSCOPIC TOTAL HYSTERECTOMY N/A 08/23/2022     Procedure: HYSTERECTOMY, TOTAL, LAPAROSCOPIC;  Surgeon: Izaiah Murphy MD;  Location: [facility];  Service: OB/GYN;  Laterality: N/A;    PELVIC LAPAROSCOPY  08/11/2014    normal    TONSILLECTOMY      TUBAL LIGATION      UPPER GASTROINTESTINAL ENDOSCOPY       Family History   Problem Relation Age of Onset    Lupus Mother     Hypertension Father      Social History     Tobacco Use    Smoking status: Never    Smokeless tobacco: Never   Substance Use Topics    Alcohol use: No     Alcohol/week: 0.0 standard drinks    Drug use: No     Review of Systems   Musculoskeletal:  Positive for arthralgias, joint swelling and myalgias.   Skin:  Negative for color change.   Neurological:  Negative for weakness and numbness.     Physical Exam     Initial Vitals [04/03/23 1307]   BP Pulse Resp Temp SpO2   (!) 131/97 82 18 98.2 °F (36.8 °C) 96 %      MAP       --         Physical Exam    Nursing note and vitals reviewed.  Constitutional: She appears well-developed and well-nourished.   HENT:   Head: Normocephalic and atraumatic.   Cardiovascular:  Normal rate.     Exam reveals no gallop and no friction rub.       No murmur heard.  Pulmonary/Chest: Breath sounds normal. She has no wheezes. She has no rhonchi. She has no rales.   Musculoskeletal:         General: Tenderness present. No edema.      Comments: Pain with flexion and extension of the left wrist good radial pulse no scaphoid tenderness.  Tenderness to the right lateral aspect of the right ankle full range of motion     Neurological: She is alert and oriented to person, place, and time.   Skin: Skin is warm and dry.       ED Course   Procedures  Labs Reviewed - No data to display       Imaging Results              X-Ray Ankle Complete Right (Final result)  Result time 04/03/23 15:05:38   Procedure changed from X-Ray Ankle Complete Left     Final result by Phil Weems MD (04/03/23 15:05:38)                   Impression:      1. No radiographic abnormality of the right  ankle.  2. Small calcaneal osteophyte.      Electronically signed by: Phil Weems MD  Date:    04/03/2023  Time:    15:05               Narrative:    EXAMINATION:  XR ANKLE COMPLETE 3 VIEW RIGHT    CLINICAL HISTORY:  Right ankle pain; Unspecified fall, initial encounter    TECHNIQUE:  AP, lateral, and oblique images of the right ankle were performed.    COMPARISON:  None    FINDINGS:  No fracture or subluxation are identified.  The ankle mortise is intact and well aligned.  The tibiotalar and subtalar joints are well maintained.  There is no significant soft tissue swelling.  There are no signs of joint effusion.  There is a small osteophyte arising from the posterior calcaneus at the Achilles tendon insertion.                                       X-Ray Wrist Complete Left (Final result)  Result time 04/03/23 14:44:45      Final result by Ben Velasquez MD (04/03/23 14:44:45)                   Impression:      No acute finding.      Electronically signed by: Ben Velasquez MD  Date:    04/03/2023  Time:    14:44               Narrative:    EXAMINATION:  XR WRIST COMPLETE 3 VIEWS LEFT    CLINICAL HISTORY:  Fall    COMPARISON:  None    FINDINGS:  No fracture or dislocation identified.  Soft tissues are unremarkable.                                       Medications   ketorolac injection 15 mg (15 mg Intramuscular Given 4/3/23 1501)     Medical Decision Making:   Differential Diagnosis:   Ankle sprain, wrist sprain, strain, ligamentous injury  Clinical Tests:   Radiological Study: Reviewed  ED Management:  Patient with tenderness to the dorsal aspect of the left wrist with no visible deformity   Patient has tenderness to the lateral aspect of the right ankle full range of motion no swelling no warmth no crepitus no erythema   Imaging done in the ED with no acute findings on ankle or wrist. Given wrist brace for support  Will treat symptomatically for mild sprain with anti-inflammatories and RICE protocol   Stable for  DC with close follow-up given return precautions                        Clinical Impression:   Final diagnoses:  [W19.XXXA] Fall  [S63.502A] Wrist sprain, left, initial encounter (Primary)        ED Disposition Condition    Discharge Stable          ED Prescriptions       Medication Sig Dispense Start Date End Date Auth. Provider    ketorolac (TORADOL) 10 mg tablet Take 1 tablet (10 mg total) by mouth every 6 (six) hours. for 5 days 20 tablet 4/3/2023 4/8/2023 Katharine Osorio NP          Follow-up Information    None          Katharine Osorio NP  04/03/23 152

## 2023-04-03 NOTE — Clinical Note
"Epifanio Desouzakhadar"JeremiasRomie was seen and treated in our emergency department on 4/3/2023.  She may return to work on 04/03/2023.       If you have any questions or concerns, please don't hesitate to call.      Katharine Osorio NP"

## 2023-04-25 ENCOUNTER — PATIENT MESSAGE (OUTPATIENT)
Dept: CARDIOLOGY | Facility: CLINIC | Age: 43
End: 2023-04-25
Payer: COMMERCIAL

## 2023-05-01 ENCOUNTER — PATIENT MESSAGE (OUTPATIENT)
Dept: CARDIOLOGY | Facility: CLINIC | Age: 43
End: 2023-05-01
Payer: COMMERCIAL

## 2023-07-24 ENCOUNTER — HOSPITAL ENCOUNTER (EMERGENCY)
Facility: HOSPITAL | Age: 43
Discharge: HOME | End: 2023-07-24
Attending: SURGERY
Payer: COMMERCIAL

## 2023-07-24 VITALS
DIASTOLIC BLOOD PRESSURE: 76 MMHG | WEIGHT: 207.88 LBS | OXYGEN SATURATION: 97 % | RESPIRATION RATE: 20 BRPM | TEMPERATURE: 99 F | BODY MASS INDEX: 34.6 KG/M2 | HEART RATE: 83 BPM | SYSTOLIC BLOOD PRESSURE: 141 MMHG

## 2023-07-24 DIAGNOSIS — M79.676 TOE PAIN: ICD-10-CM

## 2023-07-24 LAB
ALBUMIN SERPL BCP-MCNC: 3.8 G/DL (ref 3.5–5.2)
ALP SERPL-CCNC: 93 U/L (ref 55–135)
ALT SERPL W/O P-5'-P-CCNC: 12 U/L (ref 10–44)
ANION GAP SERPL CALC-SCNC: 9 MMOL/L (ref 8–16)
AST SERPL-CCNC: 12 U/L (ref 10–40)
BASOPHILS # BLD AUTO: 0.03 K/UL (ref 0–0.2)
BASOPHILS NFR BLD: 0.5 % (ref 0–1.9)
BILIRUB SERPL-MCNC: 0.3 MG/DL (ref 0.1–1)
BUN SERPL-MCNC: 8 MG/DL (ref 6–20)
CALCIUM SERPL-MCNC: 9.5 MG/DL (ref 8.7–10.5)
CHLORIDE SERPL-SCNC: 107 MMOL/L (ref 95–110)
CO2 SERPL-SCNC: 25 MMOL/L (ref 23–29)
CREAT SERPL-MCNC: 0.8 MG/DL (ref 0.5–1.4)
DIFFERENTIAL METHOD: ABNORMAL
EOSINOPHIL # BLD AUTO: 0.2 K/UL (ref 0–0.5)
EOSINOPHIL NFR BLD: 3.9 % (ref 0–8)
ERYTHROCYTE [DISTWIDTH] IN BLOOD BY AUTOMATED COUNT: 17 % (ref 11.5–14.5)
EST. GFR  (NO RACE VARIABLE): >60 ML/MIN/1.73 M^2
GLUCOSE SERPL-MCNC: 99 MG/DL (ref 70–110)
HCT VFR BLD AUTO: 38.7 % (ref 37–48.5)
HGB BLD-MCNC: 12.3 G/DL (ref 12–16)
IMM GRANULOCYTES # BLD AUTO: 0.01 K/UL (ref 0–0.04)
IMM GRANULOCYTES NFR BLD AUTO: 0.2 % (ref 0–0.5)
LYMPHOCYTES # BLD AUTO: 2.2 K/UL (ref 1–4.8)
LYMPHOCYTES NFR BLD: 35.3 % (ref 18–48)
MCH RBC QN AUTO: 25.9 PG (ref 27–31)
MCHC RBC AUTO-ENTMCNC: 31.8 G/DL (ref 32–36)
MCV RBC AUTO: 82 FL (ref 82–98)
MONOCYTES # BLD AUTO: 0.4 K/UL (ref 0.3–1)
MONOCYTES NFR BLD: 6.8 % (ref 4–15)
NEUTROPHILS # BLD AUTO: 3.3 K/UL (ref 1.8–7.7)
NEUTROPHILS NFR BLD: 53.3 % (ref 38–73)
NRBC BLD-RTO: 0 /100 WBC
PLATELET # BLD AUTO: 288 K/UL (ref 150–450)
PMV BLD AUTO: 9.4 FL (ref 9.2–12.9)
POTASSIUM SERPL-SCNC: 3.9 MMOL/L (ref 3.5–5.1)
PROT SERPL-MCNC: 7.6 G/DL (ref 6–8.4)
RBC # BLD AUTO: 4.74 M/UL (ref 4–5.4)
SODIUM SERPL-SCNC: 141 MMOL/L (ref 136–145)
URATE SERPL-MCNC: 4.6 MG/DL (ref 2.4–5.7)
WBC # BLD AUTO: 6.17 K/UL (ref 3.9–12.7)

## 2023-07-24 PROCEDURE — 80053 COMPREHEN METABOLIC PANEL: CPT | Performed by: SURGERY

## 2023-07-24 PROCEDURE — 85025 COMPLETE CBC W/AUTO DIFF WBC: CPT | Performed by: SURGERY

## 2023-07-24 PROCEDURE — 36415 COLL VENOUS BLD VENIPUNCTURE: CPT | Performed by: SURGERY

## 2023-07-24 PROCEDURE — 99284 EMERGENCY DEPT VISIT MOD MDM: CPT

## 2023-07-24 PROCEDURE — 84550 ASSAY OF BLOOD/URIC ACID: CPT | Performed by: SURGERY

## 2023-07-24 RX ORDER — IBUPROFEN 800 MG/1
800 TABLET ORAL EVERY 6 HOURS PRN
Qty: 20 TABLET | Refills: 0 | Status: SHIPPED | OUTPATIENT
Start: 2023-07-24 | End: 2023-08-08

## 2023-07-24 NOTE — Clinical Note
"Epifanio Desouzakhadar" Romie was seen and treated in our emergency department on 7/24/2023.  She may return to work on 07/27/2023.       If you have any questions or concerns, please don't hesitate to call.      Antonino Erickson MD"

## 2023-07-24 NOTE — ED PROVIDER NOTES
Encounter Date: 7/24/2023       History     Chief Complaint   Patient presents with    Foot Pain     Patient to ER CC of pain to her right great toe since this morning, denies injury      Epifanio Grubbs is a 42 y.o. female that presents with right foot pain  Patient states her right 2nd toe hurts, denies any injury or fall on ED interview  Denies any gout history, denies any previous toe injury on ED interview today  Patient has a completely normal right foot exam, completely normal toe exam  Good distal pulses, good capillary refill, worse pain with any weight-bearing    Review of patient's allergies indicates:  No Known Allergies  Past Medical History:   Diagnosis Date    Asthma     Bulging lumbar disc 2015    Chronic pelvic pain in female     Delayed gastric emptying     Fibromyalgia     GERD (gastroesophageal reflux disease)     Lumbosacral radiculopathy     Osteoarthritis      Past Surgical History:   Procedure Laterality Date    ADENOIDECTOMY      APPENDECTOMY      laparoscopic    BACK SURGERY      infusion/lift    co 2 laser      to cervix about 5 years ago    COLONOSCOPY N/A 12/21/2016    Procedure: COLONOSCOPY;  Surgeon: Aylin García MD;  Location: [facility];  Service: Endoscopy;  Laterality: N/A;    COLPOSCOPY      HYSTERECTOMY      LAPAROSCOPIC SALPINGO-OOPHORECTOMY Bilateral 08/23/2022    Procedure: SALPINGO-OOPHORECTOMY, LAPAROSCOPIC;  Surgeon: Izaiah Murphy MD;  Location: [facility] OR;  Service: OB/GYN;  Laterality: Bilateral;    LAPAROSCOPIC TOTAL HYSTERECTOMY N/A 08/23/2022    Procedure: HYSTERECTOMY, TOTAL, LAPAROSCOPIC;  Surgeon: Izaiah Murphy MD;  Location: [facility] OR;  Service: OB/GYN;  Laterality: N/A;    PELVIC LAPAROSCOPY  08/11/2014    normal    TONSILLECTOMY      TUBAL LIGATION      UPPER GASTROINTESTINAL ENDOSCOPY       Family History   Problem Relation Age of Onset    Lupus Mother     Hypertension Father      Social History     Tobacco Use    Smoking status: Never     Smokeless tobacco: Never   Substance Use Topics    Alcohol use: No     Alcohol/week: 0.0 standard drinks    Drug use: No     Review of Systems   Constitutional:  Negative for fever.   HENT:  Negative for sore throat.    Respiratory:  Negative for shortness of breath.    Cardiovascular:  Negative for chest pain.   Gastrointestinal:  Negative for nausea.   Genitourinary:  Negative for dysuria.   Musculoskeletal:  Negative for back pain.        (+) Right 2nd toe pain   Skin:  Negative for rash.   Neurological:  Negative for weakness.   Hematological:  Does not bruise/bleed easily.     Physical Exam     Initial Vitals [07/24/23 1727]   BP Pulse Resp Temp SpO2   (!) 164/78 93 18 98 °F (36.7 °C) 99 %      MAP       --         Physical Exam    Nursing note and vitals reviewed.  Constitutional: Vital signs are normal. She appears well-developed and well-nourished. She is cooperative.   HENT:   Head: Normocephalic and atraumatic.   Right Ear: External ear normal.   Left Ear: External ear normal.   Nose: Nose normal.   Mouth/Throat: Oropharynx is clear and moist.   Eyes: Conjunctivae, EOM and lids are normal. Pupils are equal, round, and reactive to light.   Neck: Trachea normal and phonation normal. Neck supple. No JVD present.   Normal range of motion.   Full passive range of motion without pain.     Cardiovascular:  Normal rate, regular rhythm, S1 normal, S2 normal, normal heart sounds, intact distal pulses and normal pulses.           Pulmonary/Chest: Effort normal and breath sounds normal.   Abdominal: Abdomen is soft and flat. Bowel sounds are normal.   Musculoskeletal:         General: Normal range of motion.      Cervical back: Full passive range of motion without pain, normal range of motion and neck supple.     Neurological: She is alert and oriented to person, place, and time. She has normal strength.   Skin: Skin is warm, dry and intact. Capillary refill takes less than 2 seconds.       ED Course    Procedures  Labs Reviewed   CBC W/ AUTO DIFFERENTIAL - Abnormal; Notable for the following components:       Result Value    MCH 25.9 (*)     MCHC 31.8 (*)     RDW 17.0 (*)     All other components within normal limits   URIC ACID   COMPREHENSIVE METABOLIC PANEL          Imaging Results              X-Ray Foot Complete Right (Final result)  Result time 23 17:54:57      Final result by Quinten Valentin MD (23 17:54:57)                   Impression:      No acute fracture or dislocation.      Electronically signed by: Quinten Valentin  Date:    2023  Time:    17:54               Narrative:    EXAMINATION:  XR FOOT COMPLETE 3 VIEW RIGHT    CLINICAL HISTORY:  . Pain in unspecified toe(s)    TECHNIQUE:  AP, lateral, and oblique views of the right foot were performed.    COMPARISON:  None    FINDINGS:  No acute fracture, dislocation, or traumatic malalignment.  Joint spaces are maintained.  Mild plantar calcaneal and Achilles enthesopathy.                                       Medications - No data to display    Medical Decision Makin-year-old female with right 2nd toe pain after waking up this morning  Went to work & the pain was worse, denies any trauma fall or injury  Differential includes pain, strain, fracture, dislocation, gout, malingering  Lab work within normal limits, (-) x-ray in the emergency room today  Nonweightbearing with crutches going forward with Motrin prescribed for pain  Ambulatory referral to Podiatry on discharge, follow-up with PCP on DC  This patient does not need to be hospitalized on ER evaluation today  The patient's diagnosis is not limited by social determinants of health  Does not require surgery or procedure (major or minor), no risk factors  Patient counseled to return to the ER with any concerning symptoms                         Clinical Impression:   Final diagnoses:  [M79.676] Toe pain        ED Disposition Condition    Discharge Stable          ED  Prescriptions       Medication Sig Dispense Start Date End Date Auth. Provider    ibuprofen (ADVIL,MOTRIN) 800 MG tablet Take 1 tablet (800 mg total) by mouth every 6 (six) hours as needed for Pain. 20 tablet 7/24/2023 -- Antonino Erickson MD          Follow-up Information       Follow up With Specialties Details Why Contact Info    Trinity Rangel NP Family Medicine Schedule an appointment as soon as possible for a visit in 2 days  [address] [facility] [address]  [phone]               Antonino Erickson MD  07/24/23 1946

## 2023-08-09 ENCOUNTER — HOSPITAL ENCOUNTER (EMERGENCY)
Facility: HOSPITAL | Age: 43
Discharge: HOME | End: 2023-08-09
Attending: STUDENT IN AN ORGANIZED HEALTH CARE EDUCATION/TRAINING PROGRAM
Payer: COMMERCIAL

## 2023-08-09 VITALS
RESPIRATION RATE: 20 BRPM | SYSTOLIC BLOOD PRESSURE: 154 MMHG | WEIGHT: 207 LBS | TEMPERATURE: 99 F | BODY MASS INDEX: 34.45 KG/M2 | OXYGEN SATURATION: 99 % | HEART RATE: 94 BPM | DIASTOLIC BLOOD PRESSURE: 71 MMHG

## 2023-08-09 DIAGNOSIS — R20.0 RIGHT SIDED NUMBNESS: ICD-10-CM

## 2023-08-09 DIAGNOSIS — R53.1 ACUTE RIGHT-SIDED WEAKNESS: Primary | ICD-10-CM

## 2023-08-09 DIAGNOSIS — I10 PRIMARY HYPERTENSION: ICD-10-CM

## 2023-08-09 LAB
ALBUMIN SERPL BCP-MCNC: 3.8 G/DL (ref 3.5–5.2)
ALP SERPL-CCNC: 105 U/L (ref 55–135)
ALT SERPL W/O P-5'-P-CCNC: 14 U/L (ref 10–44)
ANION GAP SERPL CALC-SCNC: 11 MMOL/L (ref 8–16)
APTT PPP: 23.8 SEC (ref 21–32)
AST SERPL-CCNC: 14 U/L (ref 10–40)
BASOPHILS # BLD AUTO: 0.02 K/UL (ref 0–0.2)
BASOPHILS NFR BLD: 0.3 % (ref 0–1.9)
BILIRUB SERPL-MCNC: 0.3 MG/DL (ref 0.1–1)
BUN SERPL-MCNC: 11 MG/DL (ref 6–20)
CALCIUM SERPL-MCNC: 10 MG/DL (ref 8.7–10.5)
CHLORIDE SERPL-SCNC: 111 MMOL/L (ref 95–110)
CHOLEST SERPL-MCNC: 170 MG/DL (ref 120–199)
CHOLEST/HDLC SERPL: 3.5 {RATIO} (ref 2–5)
CO2 SERPL-SCNC: 22 MMOL/L (ref 23–29)
CREAT SERPL-MCNC: 0.8 MG/DL (ref 0.5–1.4)
DIFFERENTIAL METHOD: ABNORMAL
EOSINOPHIL # BLD AUTO: 0.4 K/UL (ref 0–0.5)
EOSINOPHIL NFR BLD: 5.8 % (ref 0–8)
ERYTHROCYTE [DISTWIDTH] IN BLOOD BY AUTOMATED COUNT: 17 % (ref 11.5–14.5)
EST. GFR  (NO RACE VARIABLE): >60 ML/MIN/1.73 M^2
ESTIMATED AVG GLUCOSE: 103 MG/DL (ref 68–131)
GLUCOSE SERPL-MCNC: 96 MG/DL (ref 70–110)
HBA1C MFR BLD: 5.2 % (ref 4–5.6)
HCT VFR BLD AUTO: 41.2 % (ref 37–48.5)
HDLC SERPL-MCNC: 48 MG/DL (ref 40–75)
HDLC SERPL: 28.2 % (ref 20–50)
HGB BLD-MCNC: 13 G/DL (ref 12–16)
IMM GRANULOCYTES # BLD AUTO: 0.02 K/UL (ref 0–0.04)
IMM GRANULOCYTES NFR BLD AUTO: 0.3 % (ref 0–0.5)
INR PPP: 1 (ref 0.8–1.2)
LDLC SERPL CALC-MCNC: 109.6 MG/DL (ref 63–159)
LYMPHOCYTES # BLD AUTO: 2 K/UL (ref 1–4.8)
LYMPHOCYTES NFR BLD: 30.8 % (ref 18–48)
MCH RBC QN AUTO: 26.1 PG (ref 27–31)
MCHC RBC AUTO-ENTMCNC: 31.6 G/DL (ref 32–36)
MCV RBC AUTO: 83 FL (ref 82–98)
MONOCYTES # BLD AUTO: 0.5 K/UL (ref 0.3–1)
MONOCYTES NFR BLD: 7.2 % (ref 4–15)
NEUTROPHILS # BLD AUTO: 3.6 K/UL (ref 1.8–7.7)
NEUTROPHILS NFR BLD: 55.6 % (ref 38–73)
NONHDLC SERPL-MCNC: 122 MG/DL
NRBC BLD-RTO: 0 /100 WBC
PLATELET # BLD AUTO: 302 K/UL (ref 150–450)
PMV BLD AUTO: 10 FL (ref 9.2–12.9)
POTASSIUM SERPL-SCNC: 4.1 MMOL/L (ref 3.5–5.1)
PROT SERPL-MCNC: 7.9 G/DL (ref 6–8.4)
PROTHROMBIN TIME: 10.2 SEC (ref 9–12.5)
RBC # BLD AUTO: 4.98 M/UL (ref 4–5.4)
SODIUM SERPL-SCNC: 144 MMOL/L (ref 136–145)
TRIGL SERPL-MCNC: 62 MG/DL (ref 30–150)
TSH SERPL DL<=0.005 MIU/L-ACNC: 1.39 UIU/ML (ref 0.4–4)
WBC # BLD AUTO: 6.52 K/UL (ref 3.9–12.7)

## 2023-08-09 PROCEDURE — 93010 ELECTROCARDIOGRAM REPORT: CPT | Mod: ,,, | Performed by: INTERNAL MEDICINE

## 2023-08-09 PROCEDURE — 96374 THER/PROPH/DIAG INJ IV PUSH: CPT | Mod: 59

## 2023-08-09 PROCEDURE — 63600175 PHARM REV CODE 636 W HCPCS: Performed by: STUDENT IN AN ORGANIZED HEALTH CARE EDUCATION/TRAINING PROGRAM

## 2023-08-09 PROCEDURE — 25500020 PHARM REV CODE 255: Performed by: STUDENT IN AN ORGANIZED HEALTH CARE EDUCATION/TRAINING PROGRAM

## 2023-08-09 PROCEDURE — 84443 ASSAY THYROID STIM HORMONE: CPT | Performed by: STUDENT IN AN ORGANIZED HEALTH CARE EDUCATION/TRAINING PROGRAM

## 2023-08-09 PROCEDURE — 96376 TX/PRO/DX INJ SAME DRUG ADON: CPT | Mod: 59

## 2023-08-09 PROCEDURE — 85025 COMPLETE CBC W/AUTO DIFF WBC: CPT | Performed by: STUDENT IN AN ORGANIZED HEALTH CARE EDUCATION/TRAINING PROGRAM

## 2023-08-09 PROCEDURE — 99900035 HC TECH TIME PER 15 MIN (STAT)

## 2023-08-09 PROCEDURE — 80053 COMPREHEN METABOLIC PANEL: CPT | Performed by: STUDENT IN AN ORGANIZED HEALTH CARE EDUCATION/TRAINING PROGRAM

## 2023-08-09 PROCEDURE — 80061 LIPID PANEL: CPT | Performed by: STUDENT IN AN ORGANIZED HEALTH CARE EDUCATION/TRAINING PROGRAM

## 2023-08-09 PROCEDURE — 85730 THROMBOPLASTIN TIME PARTIAL: CPT | Performed by: STUDENT IN AN ORGANIZED HEALTH CARE EDUCATION/TRAINING PROGRAM

## 2023-08-09 PROCEDURE — 85610 PROTHROMBIN TIME: CPT | Performed by: STUDENT IN AN ORGANIZED HEALTH CARE EDUCATION/TRAINING PROGRAM

## 2023-08-09 PROCEDURE — 93010 EKG 12-LEAD: ICD-10-PCS | Mod: ,,, | Performed by: INTERNAL MEDICINE

## 2023-08-09 PROCEDURE — 93005 ELECTROCARDIOGRAM TRACING: CPT

## 2023-08-09 PROCEDURE — 99285 EMERGENCY DEPT VISIT HI MDM: CPT | Mod: 25

## 2023-08-09 PROCEDURE — 96375 TX/PRO/DX INJ NEW DRUG ADDON: CPT

## 2023-08-09 PROCEDURE — 83036 HEMOGLOBIN GLYCOSYLATED A1C: CPT | Performed by: STUDENT IN AN ORGANIZED HEALTH CARE EDUCATION/TRAINING PROGRAM

## 2023-08-09 RX ORDER — ONDANSETRON 2 MG/ML
4 INJECTION INTRAMUSCULAR; INTRAVENOUS
Status: COMPLETED | OUTPATIENT
Start: 2023-08-09 | End: 2023-08-09

## 2023-08-09 RX ORDER — HYDRALAZINE HYDROCHLORIDE 20 MG/ML
10 INJECTION INTRAMUSCULAR; INTRAVENOUS
Status: COMPLETED | OUTPATIENT
Start: 2023-08-09 | End: 2023-08-09

## 2023-08-09 RX ADMIN — ONDANSETRON HYDROCHLORIDE 4 MG: 2 SOLUTION INTRAMUSCULAR; INTRAVENOUS at 02:08

## 2023-08-09 RX ADMIN — HYDRALAZINE HYDROCHLORIDE 10 MG: 20 INJECTION, SOLUTION INTRAMUSCULAR; INTRAVENOUS at 01:08

## 2023-08-09 RX ADMIN — IOHEXOL 75 ML: 350 INJECTION, SOLUTION INTRAVENOUS at 08:08

## 2023-08-09 RX ADMIN — HYDRALAZINE HYDROCHLORIDE 10 MG: 20 INJECTION, SOLUTION INTRAMUSCULAR; INTRAVENOUS at 12:08
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
## 2023-08-09 NOTE — Clinical Note
"Epifanio Grubbs ([place]) was seen and treated in our emergency department on 8/9/2023.  She may return to work on 08/18/2023.       If you have any questions or concerns, please don't hesitate to call.       RN    "

## 2023-08-16 ENCOUNTER — OFFICE VISIT (OUTPATIENT)
Dept: CARDIOLOGY | Facility: CLINIC | Age: 43
End: 2023-08-16
Payer: COMMERCIAL

## 2023-08-16 VITALS
HEIGHT: 65 IN | WEIGHT: 205 LBS | OXYGEN SATURATION: 97 % | RESPIRATION RATE: 18 BRPM | HEART RATE: 74 BPM | DIASTOLIC BLOOD PRESSURE: 82 MMHG | SYSTOLIC BLOOD PRESSURE: 140 MMHG | BODY MASS INDEX: 34.16 KG/M2

## 2023-08-16 DIAGNOSIS — R55 VASOVAGAL SYNCOPE: ICD-10-CM

## 2023-08-16 DIAGNOSIS — I10 PRIMARY HYPERTENSION: ICD-10-CM

## 2023-08-16 DIAGNOSIS — J45.20 MILD INTERMITTENT ASTHMA WITHOUT COMPLICATION: ICD-10-CM

## 2023-08-16 DIAGNOSIS — E66.09 CLASS 1 OBESITY DUE TO EXCESS CALORIES WITHOUT SERIOUS COMORBIDITY WITH BODY MASS INDEX (BMI) OF 33.0 TO 33.9 IN ADULT: ICD-10-CM

## 2023-08-16 DIAGNOSIS — R00.2 PALPITATIONS: Primary | ICD-10-CM

## 2023-08-16 PROCEDURE — 99999 PR PBB SHADOW E&M-EST. PATIENT-LVL IV: CPT | Mod: PBBFAC,,, | Performed by: INTERNAL MEDICINE

## 2023-08-16 PROCEDURE — 99214 PR OFFICE/OUTPT VISIT, EST, LEVL IV, 30-39 MIN: ICD-10-PCS | Mod: S$GLB,,, | Performed by: INTERNAL MEDICINE

## 2023-08-16 PROCEDURE — 4010F PR ACE/ARB THEARPY RXD/TAKEN: ICD-10-PCS | Mod: CPTII,S$GLB,, | Performed by: INTERNAL MEDICINE

## 2023-08-16 PROCEDURE — 99999 PR PBB SHADOW E&M-EST. PATIENT-LVL IV: ICD-10-PCS | Mod: PBBFAC,,, | Performed by: INTERNAL MEDICINE

## 2023-08-16 PROCEDURE — 3079F PR MOST RECENT DIASTOLIC BLOOD PRESSURE 80-89 MM HG: ICD-10-PCS | Mod: CPTII,S$GLB,, | Performed by: INTERNAL MEDICINE

## 2023-08-16 PROCEDURE — 3077F SYST BP >= 140 MM HG: CPT | Mod: CPTII,S$GLB,, | Performed by: INTERNAL MEDICINE

## 2023-08-16 PROCEDURE — 3008F BODY MASS INDEX DOCD: CPT | Mod: CPTII,S$GLB,, | Performed by: INTERNAL MEDICINE

## 2023-08-16 PROCEDURE — 3044F PR MOST RECENT HEMOGLOBIN A1C LEVEL <7.0%: ICD-10-PCS | Mod: CPTII,S$GLB,, | Performed by: INTERNAL MEDICINE

## 2023-08-16 PROCEDURE — 3008F PR BODY MASS INDEX (BMI) DOCUMENTED: ICD-10-PCS | Mod: CPTII,S$GLB,, | Performed by: INTERNAL MEDICINE

## 2023-08-16 PROCEDURE — 3077F PR MOST RECENT SYSTOLIC BLOOD PRESSURE >= 140 MM HG: ICD-10-PCS | Mod: CPTII,S$GLB,, | Performed by: INTERNAL MEDICINE

## 2023-08-16 PROCEDURE — 3079F DIAST BP 80-89 MM HG: CPT | Mod: CPTII,S$GLB,, | Performed by: INTERNAL MEDICINE

## 2023-08-16 PROCEDURE — 4010F ACE/ARB THERAPY RXD/TAKEN: CPT | Mod: CPTII,S$GLB,, | Performed by: INTERNAL MEDICINE

## 2023-08-16 PROCEDURE — 3044F HG A1C LEVEL LT 7.0%: CPT | Mod: CPTII,S$GLB,, | Performed by: INTERNAL MEDICINE

## 2023-08-16 PROCEDURE — 99214 OFFICE O/P EST MOD 30 MIN: CPT | Mod: S$GLB,,, | Performed by: INTERNAL MEDICINE

## 2023-08-16 RX ORDER — LOSARTAN POTASSIUM 25 MG/1
25 TABLET ORAL 2 TIMES DAILY
Qty: 45 TABLET | Refills: 3
Start: 2023-08-16 | End: 2023-08-16

## 2023-08-16 RX ORDER — LOSARTAN POTASSIUM 100 MG/1
100 TABLET ORAL DAILY
Qty: 90 TABLET | Refills: 3 | Status: SHIPPED | OUTPATIENT
Start: 2023-08-16 | End: 2024-08-15

## 2023-08-16 RX ORDER — METOPROLOL SUCCINATE 25 MG/1
25 TABLET, EXTENDED RELEASE ORAL DAILY
Qty: 90 TABLET | Refills: 3 | Status: SHIPPED | OUTPATIENT
Start: 2023-08-16

## 2023-08-16 NOTE — PROGRESS NOTES
[facility] Cardiology     Subjective:    Patient ID:  Epifanio Grubbs is a 42 y.o. female who presents for follow-up of Hypertension and Palpitations    Review of patient's allergies indicates:  No Known Allergies   We establish care in December 2023 after a syncopal episode that sounded vasovagal.  Her echo in December showed normal ejection fraction without LVH.  She has a history of hypertension.  Inderal was added for palpitation complaints.  Her Holter showed mild ectopy-PVCs.    She returns after a visit to the emergency room on August 9, 2023 with blood pressure 174/124 mm Hg.  She had right-sided arm weakness.  Her CTA was negative for carotid disease.  Her MRI of the brain was normal.  She states that her right arm weakness did improve but it took several hours.  She is not had recurrence.    She states her blood pressures have been poorly controlled.  She evaluates them regularly at home.  She is still having episodes of palpitations on a near daily basis.  They are intermittent.  She has been taking her Inderal.  She has had some blood pressures in the 130 systolic range at home but most of them have been quite high in the 190 range.  She has a history of asthma.  She has normal renal function.  Her LDL is 110 mg % range.    She is scheduled to see neurology in the near future.  She was taken off work until her medical evaluation is completed.        Review of Systems   Constitutional: Negative for chills, decreased appetite, diaphoresis, fever, malaise/fatigue, night sweats, weight gain and weight loss.   HENT:  Negative for congestion, ear discharge, ear pain, hearing loss, hoarse voice, nosebleeds, odynophagia, sore throat, stridor and tinnitus.    Eyes:  Negative for blurred vision, discharge, double vision, pain, photophobia, redness, vision loss in left eye, vision loss in right eye, visual disturbance and visual halos.    Cardiovascular:  Positive for dyspnea on exertion and palpitations. Negative for chest pain, claudication, cyanosis, irregular heartbeat, leg swelling, near-syncope, orthopnea, paroxysmal nocturnal dyspnea and syncope.   Respiratory:  Positive for shortness of breath. Negative for cough, hemoptysis, sleep disturbances due to breathing, snoring, sputum production and wheezing.    Endocrine: Negative for cold intolerance, heat intolerance, polydipsia, polyphagia and polyuria.   Hematologic/Lymphatic: Negative for adenopathy and bleeding problem. Does not bruise/bleed easily.   Skin:  Negative for color change, dry skin, flushing, itching, nail changes, poor wound healing, rash, skin cancer, suspicious lesions and unusual hair distribution.   Musculoskeletal:  Negative for arthritis, back pain, falls, gout, joint pain, joint swelling, muscle cramps, muscle weakness, myalgias, neck pain and stiffness.   Gastrointestinal:  Negative for bloating, abdominal pain, anorexia, change in bowel habit, bowel incontinence, constipation, diarrhea, dysphagia, excessive appetite, flatus, heartburn, hematemesis, hematochezia, hemorrhoids, jaundice, melena, nausea and vomiting.   Genitourinary:  Negative for bladder incontinence, decreased libido, dysuria, flank pain, frequency, genital sores, hematuria, hesitancy, incomplete emptying, nocturia and urgency.   Neurological:  Positive for focal weakness. Negative for aphonia, brief paralysis, difficulty with concentration, disturbances in coordination, excessive daytime sleepiness, dizziness, headaches, light-headedness, loss of balance, numbness, paresthesias, seizures, sensory change, tremors, vertigo and weakness.   Psychiatric/Behavioral:  Negative for altered mental status, depression, hallucinations, memory loss, substance abuse, suicidal ideas and thoughts of violence. The patient does not have insomnia and is not nervous/anxious.    Allergic/Immunologic: Negative for hives and  "persistent infections.        Objective:       Vitals:    08/16/23 1353   BP: (!) 140/82   Pulse: 74   Resp: 18   SpO2: 97%   Weight: 93 kg (205 lb 0.4 oz)   Height: 5' 5" (1.651 m)    Physical Exam  Constitutional:       General: She is not in acute distress.     Appearance: She is well-developed. She is not diaphoretic.   HENT:      Head: Normocephalic and atraumatic.      Nose: Nose normal.   Eyes:      General: No scleral icterus.        Right eye: No discharge.      Conjunctiva/sclera: Conjunctivae normal.      Pupils: Pupils are equal, round, and reactive to light.   Neck:      Thyroid: No thyromegaly.      Vascular: No JVD.      Trachea: No tracheal deviation.   Cardiovascular:      Rate and Rhythm: Normal rate and regular rhythm.      Pulses:           Carotid pulses are 2+ on the right side and 2+ on the left side.       Radial pulses are 2+ on the right side and 2+ on the left side.        Dorsalis pedis pulses are 2+ on the right side and 2+ on the left side.        Posterior tibial pulses are 2+ on the right side and 2+ on the left side.      Heart sounds: Normal heart sounds. No murmur heard.     No friction rub. No gallop.   Pulmonary:      Effort: Pulmonary effort is normal. No respiratory distress.      Breath sounds: Normal breath sounds. No stridor. No wheezing or rales.   Chest:      Chest wall: No tenderness.   Abdominal:      General: Bowel sounds are normal. There is no distension.      Palpations: Abdomen is soft. There is no mass.      Tenderness: There is no abdominal tenderness. There is no guarding or rebound.   Musculoskeletal:         General: No tenderness. Normal range of motion.      Cervical back: Normal range of motion and neck supple.   Lymphadenopathy:      Cervical: No cervical adenopathy.   Skin:     General: Skin is warm and dry.      Coloration: Skin is not pale.      Findings: No erythema or rash.   Neurological:      Mental Status: She is alert and oriented to person, place, " and time.      Cranial Nerves: No cranial nerve deficit.      Coordination: Coordination normal.   Psychiatric:         Behavior: Behavior normal.         Thought Content: Thought content normal.         Judgment: Judgment normal.           Assessment:       1. Palpitations    2. Primary hypertension    3. Mild intermittent asthma without complication    4. Vasovagal syncope    5. Class 1 obesity due to excess calories without serious comorbidity with body mass index (BMI) of 33.0 to 33.9 in adult      Results for orders placed or performed during the hospital encounter of 08/09/23   CBC W/ AUTO DIFFERENTIAL   Result Value Ref Range    WBC 6.52 3.90 - 12.70 K/uL    RBC 4.98 4.00 - 5.40 M/uL    Hemoglobin 13.0 12.0 - 16.0 g/dL    Hematocrit 41.2 37.0 - 48.5 %    MCV 83 82 - 98 fL    MCH 26.1 (L) 27.0 - 31.0 pg    MCHC 31.6 (L) 32.0 - 36.0 g/dL    RDW 17.0 (H) 11.5 - 14.5 %    Platelets 302 150 - 450 K/uL    MPV 10.0 9.2 - 12.9 fL    Immature Granulocytes 0.3 0.0 - 0.5 %    Gran # (ANC) 3.6 1.8 - 7.7 K/uL    Immature Grans (Abs) 0.02 0.00 - 0.04 K/uL    Lymph # 2.0 1.0 - 4.8 K/uL    Mono # 0.5 0.3 - 1.0 K/uL    Eos # 0.4 0.0 - 0.5 K/uL    Baso # 0.02 0.00 - 0.20 K/uL    nRBC 0 0 /100 WBC    Gran % 55.6 38.0 - 73.0 %    Lymph % 30.8 18.0 - 48.0 %    Mono % 7.2 4.0 - 15.0 %    Eosinophil % 5.8 0.0 - 8.0 %    Basophil % 0.3 0.0 - 1.9 %    Differential Method Automated    Comprehensive metabolic panel   Result Value Ref Range    Sodium 144 136 - 145 mmol/L    Potassium 4.1 3.5 - 5.1 mmol/L    Chloride 111 (H) 95 - 110 mmol/L    CO2 22 (L) 23 - 29 mmol/L    Glucose 96 70 - 110 mg/dL    BUN 11 6 - 20 mg/dL    Creatinine 0.8 0.5 - 1.4 mg/dL    Calcium 10.0 8.7 - 10.5 mg/dL    Total Protein 7.9 6.0 - 8.4 g/dL    Albumin 3.8 3.5 - 5.2 g/dL    Total Bilirubin 0.3 0.1 - 1.0 mg/dL    Alkaline Phosphatase 105 55 - 135 U/L    AST 14 10 - 40 U/L    ALT 14 10 - 44 U/L    eGFR >60 >60 mL/min/1.73 m^2    Anion Gap 11 8 - 16 mmol/L    Protime-INR   Result Value Ref Range    Prothrombin Time 10.2 9.0 - 12.5 sec    INR 1.0 0.8 - 1.2   TSH   Result Value Ref Range    TSH 1.394 0.400 - 4.000 uIU/mL   LDL - Lipid Panel   Result Value Ref Range    Cholesterol 170 120 - 199 mg/dL    Triglycerides 62 30 - 150 mg/dL    HDL 48 40 - 75 mg/dL    LDL Cholesterol 109.6 63.0 - 159.0 mg/dL    HDL/Cholesterol Ratio 28.2 20.0 - 50.0 %    Total Cholesterol/HDL Ratio 3.5 2.0 - 5.0    Non-HDL Cholesterol 122 mg/dL   Hemoglobin A1c   Result Value Ref Range    Hemoglobin A1C 5.2 4.0 - 5.6 %    Estimated Avg Glucose 103 68 - 131 mg/dL   APTT   Result Value Ref Range    aPTT 23.8 21.0 - 32.0 sec         Current Outpatient Medications:     albuterol (PROVENTIL HFA) 90 mcg/actuation inhaler, Inhale 2 puffs into the lungs every 6 (six) hours as needed for Wheezing. Rescue, Disp: 18 g, Rfl: 0    aspirin (ECOTRIN) 81 MG EC tablet, Take 81 mg by mouth once daily., Disp: , Rfl:     cetirizine (ZYRTEC) 10 MG tablet, Take 1 tablet (10 mg total) by mouth once daily., Disp: 30 tablet, Rfl: 0    LINZESS 290 mcg Cap capsule, TAKE 1 CAPSULE(290 MCG) BY MOUTH EVERY DAY, Disp: 90 capsule, Rfl: 3    venlafaxine (EFFEXOR-XR) 37.5 MG 24 hr capsule, Take 1 capsule (37.5 mg total) by mouth once daily., Disp: 14 capsule, Rfl: 0    losartan (COZAAR) 100 MG tablet, Take 1 tablet (100 mg total) by mouth once daily., Disp: 90 tablet, Rfl: 3    metoprolol succinate (TOPROL-XL) 25 MG 24 hr tablet, Take 1 tablet (25 mg total) by mouth once daily., Disp: 90 tablet, Rfl: 3     Lab Results   Component Value Date    WBC 6.52 08/09/2023    RBC 4.98 08/09/2023    HGB 13.0 08/09/2023    HCT 41.2 08/09/2023    MCV 83 08/09/2023    MCH 26.1 (L) 08/09/2023    MCHC 31.6 (L) 08/09/2023    RDW 17.0 (H) 08/09/2023     08/09/2023    MPV 10.0 08/09/2023    GRAN 3.6 08/09/2023    GRAN 55.6 08/09/2023    LYMPH 2.0 08/09/2023    LYMPH 30.8 08/09/2023    MONO 0.5 08/09/2023    MONO 7.2 08/09/2023    EOS 0.4  08/09/2023    BASO 0.02 08/09/2023    EOSINOPHIL 5.8 08/09/2023    BASOPHIL 0.3 08/09/2023    MG 1.8 12/07/2022        CMP  Lab Results   Component Value Date     08/09/2023    K 4.1 08/09/2023     (H) 08/09/2023    CO2 22 (L) 08/09/2023    GLU 96 08/09/2023    BUN 11 08/09/2023    CREATININE 0.8 08/09/2023    CALCIUM 10.0 08/09/2023    PROT 7.9 08/09/2023    ALBUMIN 3.8 08/09/2023    BILITOT 0.3 08/09/2023    ALKPHOS 105 08/09/2023    AST 14 08/09/2023    ALT 14 08/09/2023    ANIONGAP 11 08/09/2023    ESTGFRAFRICA >60 10/11/2020    EGFRNONAA >60 10/11/2020        Lab Results   Component Value Date    LABURIN No growth 08/16/2016            Results for orders placed or performed during the hospital encounter of 08/09/23   ECG 12 lead    Collection Time: 08/09/23  8:34 AM    Narrative    Test Reason : I63.9    Vent. Rate : 077 BPM     Atrial Rate : 077 BPM     P-R Int : 158 ms          QRS Dur : 078 ms      QT Int : 384 ms       P-R-T Axes : 029 026 013 degrees     QTc Int : 434 ms    Normal sinus rhythm  Normal ECG  When compared with ECG of 07-DEC-2022 15:35,  T wave inversion now evident in Inferior leads  T wave amplitude has increased in Lateral leads  Confirmed by Akbar PATEL, Jonas KITCHEN ([id number]) on 8/10/2023 9:38:37 AM    Referred By: [email or web address]   SELF           Confirmed By:Jonas Webber MD                  Plan:       Problem List Items Addressed This Visit          Pulmonary    Mild intermittent asthma without complication     Condition unchanged.            Cardiac/Vascular    Primary hypertension     Her readings are still elevated.  Losartan dosing increased Toprol ordered.  Inderal discontinued.  She had marked elevation of blood pressure 174/124 mm Hg on recent emergency room visit.         Relevant Medications    metoprolol succinate (TOPROL-XL) 25 MG 24 hr tablet    losartan (COZAAR) 100 MG tablet    Palpitations - Primary     Holter monitor placed.  Today's heart rate is normal without ectopy.   Her previous Holter showed PVCs.         Relevant Medications    metoprolol succinate (TOPROL-XL) 25 MG 24 hr tablet    Other Relevant Orders    Holter monitor - 48 hour       Endocrine    Class 1 obesity due to excess calories without serious comorbidity in adult     Weight loss encouraged.            Other    Syncope     No recurrence, vasovagal etiology.  Condition stable.                 I advised a 1 month follow-up.  A repeat Holter was placed.  I increased her losartan to 100 mg daily.  Toprol 25 mg started.  Inderal discontinued.    No recurrence syncope reported.           Jonas Webber MD  08/17/2023   2:25 PM

## 2023-08-17 ENCOUNTER — OFFICE VISIT (OUTPATIENT)
Dept: NEUROLOGY | Facility: CLINIC | Age: 43
End: 2023-08-17
Payer: COMMERCIAL

## 2023-08-17 VITALS
WEIGHT: 206.56 LBS | HEART RATE: 92 BPM | HEIGHT: 66 IN | DIASTOLIC BLOOD PRESSURE: 86 MMHG | RESPIRATION RATE: 16 BRPM | BODY MASS INDEX: 33.2 KG/M2 | SYSTOLIC BLOOD PRESSURE: 116 MMHG

## 2023-08-17 DIAGNOSIS — R25.1 TREMOR: ICD-10-CM

## 2023-08-17 DIAGNOSIS — F44.4 FUNCTIONAL NEUROLOGICAL SYMPTOM DISORDER (CONVERSION DISORDER), WITH ABNORMAL MOVEMENT: Primary | ICD-10-CM

## 2023-08-17 DIAGNOSIS — F41.0 PANIC ATTACKS: ICD-10-CM

## 2023-08-17 DIAGNOSIS — I77.1 SUBCLAVIAN ARTERIAL STENOSIS: ICD-10-CM

## 2023-08-17 PROBLEM — R55 SYNCOPE AND COLLAPSE: Status: RESOLVED | Noted: 2023-01-06 | Resolved: 2023-08-17

## 2023-08-17 PROBLEM — E66.09 CLASS 1 OBESITY DUE TO EXCESS CALORIES WITHOUT SERIOUS COMORBIDITY IN ADULT: Status: ACTIVE | Noted: 2023-08-17

## 2023-08-17 PROBLEM — E66.811 CLASS 1 OBESITY DUE TO EXCESS CALORIES WITHOUT SERIOUS COMORBIDITY IN ADULT: Status: ACTIVE | Noted: 2023-08-17

## 2023-08-17 PROCEDURE — 4010F PR ACE/ARB THEARPY RXD/TAKEN: ICD-10-PCS | Mod: CPTII,S$GLB,, | Performed by: PSYCHIATRY & NEUROLOGY

## 2023-08-17 PROCEDURE — 99214 OFFICE O/P EST MOD 30 MIN: CPT | Mod: S$GLB,,, | Performed by: PSYCHIATRY & NEUROLOGY

## 2023-08-17 PROCEDURE — 99999 PR PBB SHADOW E&M-EST. PATIENT-LVL IV: CPT | Mod: PBBFAC,,, | Performed by: PSYCHIATRY & NEUROLOGY

## 2023-08-17 PROCEDURE — 99999 PR PBB SHADOW E&M-EST. PATIENT-LVL IV: ICD-10-PCS | Mod: PBBFAC,,, | Performed by: PSYCHIATRY & NEUROLOGY

## 2023-08-17 PROCEDURE — 3074F PR MOST RECENT SYSTOLIC BLOOD PRESSURE < 130 MM HG: ICD-10-PCS | Mod: CPTII,S$GLB,, | Performed by: PSYCHIATRY & NEUROLOGY

## 2023-08-17 PROCEDURE — 99214 PR OFFICE/OUTPT VISIT, EST, LEVL IV, 30-39 MIN: ICD-10-PCS | Mod: S$GLB,,, | Performed by: PSYCHIATRY & NEUROLOGY

## 2023-08-17 PROCEDURE — 3044F HG A1C LEVEL LT 7.0%: CPT | Mod: CPTII,S$GLB,, | Performed by: PSYCHIATRY & NEUROLOGY

## 2023-08-17 PROCEDURE — 1159F PR MEDICATION LIST DOCUMENTED IN MEDICAL RECORD: ICD-10-PCS | Mod: CPTII,S$GLB,, | Performed by: PSYCHIATRY & NEUROLOGY

## 2023-08-17 PROCEDURE — 3079F DIAST BP 80-89 MM HG: CPT | Mod: CPTII,S$GLB,, | Performed by: PSYCHIATRY & NEUROLOGY

## 2023-08-17 PROCEDURE — 3074F SYST BP LT 130 MM HG: CPT | Mod: CPTII,S$GLB,, | Performed by: PSYCHIATRY & NEUROLOGY

## 2023-08-17 PROCEDURE — 1160F RVW MEDS BY RX/DR IN RCRD: CPT | Mod: CPTII,S$GLB,, | Performed by: PSYCHIATRY & NEUROLOGY

## 2023-08-17 PROCEDURE — 3044F PR MOST RECENT HEMOGLOBIN A1C LEVEL <7.0%: ICD-10-PCS | Mod: CPTII,S$GLB,, | Performed by: PSYCHIATRY & NEUROLOGY

## 2023-08-17 PROCEDURE — 3008F PR BODY MASS INDEX (BMI) DOCUMENTED: ICD-10-PCS | Mod: CPTII,S$GLB,, | Performed by: PSYCHIATRY & NEUROLOGY

## 2023-08-17 PROCEDURE — 1159F MED LIST DOCD IN RCRD: CPT | Mod: CPTII,S$GLB,, | Performed by: PSYCHIATRY & NEUROLOGY

## 2023-08-17 PROCEDURE — 3079F PR MOST RECENT DIASTOLIC BLOOD PRESSURE 80-89 MM HG: ICD-10-PCS | Mod: CPTII,S$GLB,, | Performed by: PSYCHIATRY & NEUROLOGY

## 2023-08-17 PROCEDURE — 3008F BODY MASS INDEX DOCD: CPT | Mod: CPTII,S$GLB,, | Performed by: PSYCHIATRY & NEUROLOGY

## 2023-08-17 PROCEDURE — 1160F PR REVIEW ALL MEDS BY PRESCRIBER/CLIN PHARMACIST DOCUMENTED: ICD-10-PCS | Mod: CPTII,S$GLB,, | Performed by: PSYCHIATRY & NEUROLOGY

## 2023-08-17 PROCEDURE — 4010F ACE/ARB THERAPY RXD/TAKEN: CPT | Mod: CPTII,S$GLB,, | Performed by: PSYCHIATRY & NEUROLOGY

## 2023-08-17 NOTE — ASSESSMENT & PLAN NOTE
Her readings are still elevated.  Losartan dosing increased Toprol ordered.  Inderal discontinued.  She had marked elevation of blood pressure 174/124 mm Hg on recent emergency room visit.
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
## 2023-08-17 NOTE — ASSESSMENT & PLAN NOTE
Holter monitor placed.  Today's heart rate is normal without ectopy.  Her previous Holter showed PVCs.

## 2023-08-18 ENCOUNTER — TELEPHONE (OUTPATIENT)
Dept: NEUROLOGY | Facility: CLINIC | Age: 43
End: 2023-08-18
Payer: COMMERCIAL

## 2023-08-18 NOTE — TELEPHONE ENCOUNTER
In-basket referral message sent to [facility] Psychiatry staff to contact patient to schedule appointment.  
yes

## 2023-08-21 ENCOUNTER — HOSPITAL ENCOUNTER (OUTPATIENT)
Dept: PULMONOLOGY | Facility: HOSPITAL | Age: 43
Discharge: HOME | End: 2023-08-21
Attending: INTERNAL MEDICINE
Payer: COMMERCIAL

## 2023-08-21 ENCOUNTER — TELEPHONE (OUTPATIENT)
Dept: PSYCHIATRY | Facility: CLINIC | Age: 43
End: 2023-08-21
Payer: COMMERCIAL

## 2023-08-21 DIAGNOSIS — R00.2 PALPITATIONS: ICD-10-CM

## 2023-08-21 PROCEDURE — 93227 HOLTER MONITOR - 48 HOUR (CUPID ONLY): ICD-10-PCS | Mod: ,,, | Performed by: INTERNAL MEDICINE

## 2023-08-21 PROCEDURE — 93225 XTRNL ECG REC<48 HRS REC: CPT

## 2023-08-21 PROCEDURE — 93227 XTRNL ECG REC<48 HR R&I: CPT | Mod: ,,, | Performed by: INTERNAL MEDICINE

## 2023-08-29 ENCOUNTER — PATIENT MESSAGE (OUTPATIENT)
Dept: CARDIOLOGY | Facility: CLINIC | Age: 43
End: 2023-08-29
Payer: COMMERCIAL

## 2023-08-29 LAB
OHS CV EVENT MONITOR DAY: 0
OHS CV HOLTER LENGTH DECIMAL HOURS: 48
OHS CV HOLTER LENGTH HOURS: 48
OHS CV HOLTER LENGTH MINUTES: 0
OHS CV HOLTER SINUS AVERAGE HR: 74
OHS CV HOLTER SINUS MAX HR: 126
OHS CV HOLTER SINUS MIN HR: 49

## 2023-12-18 ENCOUNTER — PATIENT MESSAGE (OUTPATIENT)
Dept: ADMINISTRATIVE | Facility: HOSPITAL | Age: 43
End: 2023-12-18
Payer: COMMERCIAL

## 2024-01-26 ENCOUNTER — HOSPITAL ENCOUNTER (EMERGENCY)
Facility: HOSPITAL | Age: 44
Discharge: HOME | End: 2024-01-27
Attending: EMERGENCY MEDICINE
Payer: COMMERCIAL

## 2024-01-26 VITALS
RESPIRATION RATE: 18 BRPM | OXYGEN SATURATION: 99 % | WEIGHT: 204.38 LBS | DIASTOLIC BLOOD PRESSURE: 92 MMHG | TEMPERATURE: 97 F | SYSTOLIC BLOOD PRESSURE: 138 MMHG | HEART RATE: 84 BPM | BODY MASS INDEX: 34.01 KG/M2

## 2024-01-26 DIAGNOSIS — R10.9 ABDOMINAL PAIN, UNSPECIFIED ABDOMINAL LOCATION: ICD-10-CM

## 2024-01-26 DIAGNOSIS — R10.9 ABDOMINAL PAIN: ICD-10-CM

## 2024-01-26 DIAGNOSIS — K44.9 HIATAL HERNIA: ICD-10-CM

## 2024-01-26 DIAGNOSIS — R11.2 NAUSEA AND VOMITING, UNSPECIFIED VOMITING TYPE: Primary | ICD-10-CM

## 2024-01-26 LAB
ALBUMIN SERPL BCP-MCNC: 3.9 G/DL (ref 3.5–5.2)
ALP SERPL-CCNC: 106 U/L (ref 55–135)
ALT SERPL W/O P-5'-P-CCNC: 15 U/L (ref 10–44)
ANION GAP SERPL CALC-SCNC: 8 MMOL/L (ref 8–16)
AST SERPL-CCNC: 12 U/L (ref 10–40)
BASOPHILS # BLD AUTO: 0.02 K/UL (ref 0–0.2)
BASOPHILS NFR BLD: 0.3 % (ref 0–1.9)
BILIRUB SERPL-MCNC: 0.3 MG/DL (ref 0.1–1)
BUN SERPL-MCNC: 9 MG/DL (ref 6–20)
CALCIUM SERPL-MCNC: 9.5 MG/DL (ref 8.7–10.5)
CHLORIDE SERPL-SCNC: 108 MMOL/L (ref 95–110)
CO2 SERPL-SCNC: 27 MMOL/L (ref 23–29)
CREAT SERPL-MCNC: 0.8 MG/DL (ref 0.5–1.4)
DIFFERENTIAL METHOD BLD: ABNORMAL
EOSINOPHIL # BLD AUTO: 0.5 K/UL (ref 0–0.5)
EOSINOPHIL NFR BLD: 8.3 % (ref 0–8)
ERYTHROCYTE [DISTWIDTH] IN BLOOD BY AUTOMATED COUNT: 15.3 % (ref 11.5–14.5)
EST. GFR  (NO RACE VARIABLE): >60 ML/MIN/1.73 M^2
GLUCOSE SERPL-MCNC: 105 MG/DL (ref 70–110)
HCT VFR BLD AUTO: 44 % (ref 37–48.5)
HGB BLD-MCNC: 13.9 G/DL (ref 12–16)
IMM GRANULOCYTES # BLD AUTO: 0.02 K/UL (ref 0–0.04)
IMM GRANULOCYTES NFR BLD AUTO: 0.3 % (ref 0–0.5)
LIPASE SERPL-CCNC: 21 U/L (ref 4–60)
LYMPHOCYTES # BLD AUTO: 1.5 K/UL (ref 1–4.8)
LYMPHOCYTES NFR BLD: 23.6 % (ref 18–48)
MCH RBC QN AUTO: 27.1 PG (ref 27–31)
MCHC RBC AUTO-ENTMCNC: 31.6 G/DL (ref 32–36)
MCV RBC AUTO: 86 FL (ref 82–98)
MONOCYTES # BLD AUTO: 0.4 K/UL (ref 0.3–1)
MONOCYTES NFR BLD: 5.8 % (ref 4–15)
NEUTROPHILS # BLD AUTO: 4 K/UL (ref 1.8–7.7)
NEUTROPHILS NFR BLD: 61.7 % (ref 38–73)
NRBC BLD-RTO: 0 /100 WBC
PLATELET # BLD AUTO: 304 K/UL (ref 150–450)
PMV BLD AUTO: 9.4 FL (ref 9.2–12.9)
POTASSIUM SERPL-SCNC: 3.7 MMOL/L (ref 3.5–5.1)
PROT SERPL-MCNC: 8.1 G/DL (ref 6–8.4)
RBC # BLD AUTO: 5.13 M/UL (ref 4–5.4)
SODIUM SERPL-SCNC: 143 MMOL/L (ref 136–145)
WBC # BLD AUTO: 6.53 K/UL (ref 3.9–12.7)

## 2024-01-26 PROCEDURE — 85025 COMPLETE CBC W/AUTO DIFF WBC: CPT | Performed by: EMERGENCY MEDICINE

## 2024-01-26 PROCEDURE — 99900035 HC TECH TIME PER 15 MIN (STAT)

## 2024-01-26 PROCEDURE — 96374 THER/PROPH/DIAG INJ IV PUSH: CPT

## 2024-01-26 PROCEDURE — 93010 ELECTROCARDIOGRAM REPORT: CPT | Mod: ,,, | Performed by: INTERNAL MEDICINE

## 2024-01-26 PROCEDURE — 36415 COLL VENOUS BLD VENIPUNCTURE: CPT | Performed by: EMERGENCY MEDICINE

## 2024-01-26 PROCEDURE — 25000003 PHARM REV CODE 250: Performed by: EMERGENCY MEDICINE

## 2024-01-26 PROCEDURE — 96361 HYDRATE IV INFUSION ADD-ON: CPT

## 2024-01-26 PROCEDURE — 80053 COMPREHEN METABOLIC PANEL: CPT | Performed by: EMERGENCY MEDICINE

## 2024-01-26 PROCEDURE — 93005 ELECTROCARDIOGRAM TRACING: CPT

## 2024-01-26 PROCEDURE — 99285 EMERGENCY DEPT VISIT HI MDM: CPT | Mod: 25

## 2024-01-26 PROCEDURE — 63600175 PHARM REV CODE 636 W HCPCS: Performed by: EMERGENCY MEDICINE

## 2024-01-26 PROCEDURE — 83690 ASSAY OF LIPASE: CPT | Performed by: EMERGENCY MEDICINE

## 2024-01-26 PROCEDURE — 96375 TX/PRO/DX INJ NEW DRUG ADDON: CPT

## 2024-01-26 RX ORDER — KETOROLAC TROMETHAMINE 30 MG/ML
15 INJECTION, SOLUTION INTRAMUSCULAR; INTRAVENOUS
Status: COMPLETED | OUTPATIENT
Start: 2024-01-26 | End: 2024-01-26

## 2024-01-26 RX ORDER — ONDANSETRON HYDROCHLORIDE 2 MG/ML
4 INJECTION, SOLUTION INTRAVENOUS
Status: COMPLETED | OUTPATIENT
Start: 2024-01-26 | End: 2024-01-26

## 2024-01-26 RX ADMIN — KETOROLAC TROMETHAMINE 15 MG: 30 INJECTION, SOLUTION INTRAMUSCULAR; INTRAVENOUS at 10:01

## 2024-01-26 RX ADMIN — SODIUM CHLORIDE 1000 ML: 9 INJECTION, SOLUTION INTRAVENOUS at 10:01

## 2024-01-26 RX ADMIN — ONDANSETRON 4 MG: 2 INJECTION INTRAMUSCULAR; INTRAVENOUS at 10:01

## 2024-01-27 LAB
BILIRUB UR QL STRIP: NEGATIVE
CLARITY UR: CLEAR
COLOR UR: YELLOW
GLUCOSE UR QL STRIP: NEGATIVE
HGB UR QL STRIP: NEGATIVE
KETONES UR QL STRIP: NEGATIVE
LEUKOCYTE ESTERASE UR QL STRIP: NEGATIVE
NITRITE UR QL STRIP: NEGATIVE
PH UR STRIP: 8 [PH] (ref 5–8)
PROT UR QL STRIP: NEGATIVE
SP GR UR STRIP: 1.01 (ref 1–1.03)
URN SPEC COLLECT METH UR: NORMAL
UROBILINOGEN UR STRIP-ACNC: NEGATIVE EU/DL

## 2024-01-27 PROCEDURE — 81003 URINALYSIS AUTO W/O SCOPE: CPT | Performed by: EMERGENCY MEDICINE

## 2024-01-27 PROCEDURE — 25500020 PHARM REV CODE 255: Performed by: EMERGENCY MEDICINE

## 2024-01-27 PROCEDURE — 96376 TX/PRO/DX INJ SAME DRUG ADON: CPT

## 2024-01-27 PROCEDURE — 63600175 PHARM REV CODE 636 W HCPCS: Performed by: EMERGENCY MEDICINE

## 2024-01-27 PROCEDURE — 25000003 PHARM REV CODE 250: Performed by: EMERGENCY MEDICINE

## 2024-01-27 RX ORDER — ONDANSETRON 4 MG/1
4 TABLET, FILM COATED ORAL EVERY 6 HOURS
Qty: 12 TABLET | Refills: 0 | Status: SHIPPED | OUTPATIENT
Start: 2024-01-27 | End: 2024-04-09

## 2024-01-27 RX ORDER — PANTOPRAZOLE SODIUM 40 MG/1
40 TABLET, DELAYED RELEASE ORAL DAILY
Qty: 30 TABLET | Refills: 11 | Status: SHIPPED | OUTPATIENT
Start: 2024-01-27 | End: 2024-03-08

## 2024-01-27 RX ORDER — FAMOTIDINE 10 MG/ML
20 INJECTION INTRAVENOUS
Status: COMPLETED | OUTPATIENT
Start: 2024-01-27 | End: 2024-01-27

## 2024-01-27 RX ORDER — ONDANSETRON HYDROCHLORIDE 2 MG/ML
8 INJECTION, SOLUTION INTRAVENOUS
Status: DISCONTINUED | OUTPATIENT
Start: 2024-01-27 | End: 2024-01-27

## 2024-01-27 RX ORDER — ONDANSETRON HYDROCHLORIDE 2 MG/ML
8 INJECTION, SOLUTION INTRAVENOUS
Status: COMPLETED | OUTPATIENT
Start: 2024-01-27 | End: 2024-01-27

## 2024-01-27 RX ADMIN — ONDANSETRON 8 MG: 2 INJECTION INTRAMUSCULAR; INTRAVENOUS at 01:01

## 2024-01-27 RX ADMIN — IOHEXOL 100 ML: 350 INJECTION, SOLUTION INTRAVENOUS at 12:01

## 2024-01-27 RX ADMIN — FAMOTIDINE 20 MG: 10 INJECTION INTRAVENOUS at 01:01

## 2024-01-27 NOTE — ED NOTES
Pt provided a urine specimen cup, castile soap towelette wipe, and instructions for [facility]. Pt verbalizes understanding of a clean catch collection.

## 2024-01-27 NOTE — ED PROVIDER NOTES
Encounter Date: 1/26/2024       History     Chief Complaint   Patient presents with    Abdominal Pain    Vomiting     PT TO ER WITH C/O LUQ ABDOMINAL PAIN AND VOMITING THAT BEGAN THIS AM.      43-year-old female with past medical history significant for gastroesophageal reflux disease, fibromyalgia, status post hysterectomy, status post appendectomy, came in complaining of diffuse abdominal pain for about a week and vomiting for last 24 hours.    Patient says pain started about a week ago and progressively got worse and since morning she has been vomiting.    Patient denies any fever or chills.  Patient denies any blood in the vomitus or stool.  Patient denies any urinary symptoms.        Review of patient's allergies indicates:  No Known Allergies  Past Medical History:   Diagnosis Date    Bulging lumbar disc 2015    Chronic pelvic pain in female     Delayed gastric emptying     Fibromyalgia     GERD (gastroesophageal reflux disease)     Lumbosacral radiculopathy     Osteoarthritis      Past Surgical History:   Procedure Laterality Date    ADENOIDECTOMY      APPENDECTOMY      laparoscopic    BACK SURGERY      infusion/lift    co 2 laser      to cervix about 5 years ago    COLONOSCOPY N/A 12/21/2016    Procedure: COLONOSCOPY;  Surgeon: Aylin García MD;  Location: [facility];  Service: Endoscopy;  Laterality: N/A;    COLPOSCOPY      HYSTERECTOMY      LAPAROSCOPIC SALPINGO-OOPHORECTOMY Bilateral 08/23/2022    Procedure: SALPINGO-OOPHORECTOMY, LAPAROSCOPIC;  Surgeon: Izaiah Murphy MD;  Location: [facility] OR;  Service: OB/GYN;  Laterality: Bilateral;    LAPAROSCOPIC TOTAL HYSTERECTOMY N/A 08/23/2022    Procedure: HYSTERECTOMY, TOTAL, LAPAROSCOPIC;  Surgeon: Izaiah Murphy MD;  Location: [facility] OR;  Service: OB/GYN;  Laterality: N/A;    PELVIC LAPAROSCOPY  08/11/2014    normal    TONSILLECTOMY      TUBAL LIGATION      UPPER GASTROINTESTINAL ENDOSCOPY       Family History   Problem Relation Age of Onset     Lupus Mother     Hypertension Father      Social History     Tobacco Use    Smoking status: Never    Smokeless tobacco: Never   Substance Use Topics    Alcohol use: No     Alcohol/week: 0.0 standard drinks of alcohol    Drug use: No     Review of Systems   Constitutional:  Negative for diaphoresis, fatigue and fever.   HENT: Negative.     Eyes: Negative.    Respiratory: Negative.     Cardiovascular: Negative.    Gastrointestinal:  Positive for abdominal pain, nausea and vomiting. Negative for abdominal distention, anal bleeding, blood in stool, constipation and diarrhea.   Endocrine: Negative.    Genitourinary:  Negative for difficulty urinating, dyspareunia, dysuria, flank pain, frequency, hematuria and urgency.   Musculoskeletal: Negative.    Allergic/Immunologic: Negative.    Neurological: Negative.    Hematological: Negative.    Psychiatric/Behavioral: Negative.         Physical Exam     Initial Vitals [01/26/24 2202]   BP Pulse Resp Temp SpO2   (!) 138/92 84 18 97.1 °F (36.2 °C) 99 %      MAP       --         Physical Exam    Constitutional: She appears well-developed and well-nourished.   HENT:   Head: Normocephalic and atraumatic.   Eyes: EOM are normal. Pupils are equal, round, and reactive to light.   Neck: Neck supple.   Normal range of motion.  Cardiovascular:  Normal rate and regular rhythm.           No murmur heard.  Pulmonary/Chest: Breath sounds normal. No respiratory distress. She has no wheezes. She has no rhonchi. She has no rales.   Abdominal: Abdomen is soft. There is abdominal tenderness. There is no rebound and no guarding.   Musculoskeletal:         General: Normal range of motion.      Cervical back: Normal range of motion and neck supple.     Neurological: She is alert and oriented to person, place, and time.   Skin: Skin is warm and dry.         ED Course   Procedures  Labs Reviewed   CBC W/ AUTO DIFFERENTIAL - Abnormal; Notable for the following components:       Result Value    MCHC 31.6  (*)     RDW 15.3 (*)     Eosinophil % 8.3 (*)     All other components within normal limits   COMPREHENSIVE METABOLIC PANEL   LIPASE   URINALYSIS, REFLEX TO URINE CULTURE    Narrative:     Specimen Source->Urine   URINALYSIS     EKG Readings: (Independently Interpreted)   Normal sinus rhythm at 74, nonspecific ST-T changes, normal intervals.       Imaging Results              CT Abdomen Pelvis With IV Contrast NO Oral Contrast (Final result)  Result time 01/27/24 01:05:13      Final result by Declan Chaney MD (01/27/24 01:05:13)                   Impression:      No CT evidence of acute intra-abdominal abnormality.    Moderate-sized hiatal hernia.    Hysterectomy and appendectomy.    Additional findings as above.      Electronically signed by: Declan Chaney MD  Date:    01/27/2024  Time:    01:05               Narrative:    EXAMINATION:  CT ABDOMEN PELVIS WITH IV CONTRAST    CLINICAL HISTORY:  LLQ abdominal pain;    TECHNIQUE:  Low dose axial images, sagittal and coronal reformations were obtained from the lung bases to the pubic symphysis following the IV administration of 100 mL of Omnipaque 350 .  Oral contrast was not given.    COMPARISON:  CT 01/17/2024    FINDINGS:  There is mild dependent atelectasis at the lung bases.  The visualized portions of the heart appear normal.    There are two subcentimeter heterogeneously enhancing right hepatic lobe lesions, too small to accurately characterize, although favored to represent small hemangiomas.  The gallbladder shows no evidence of stones or pericholecystic fluid.  There is no intra-or extrahepatic biliary ductal dilatation.    There is a moderate-sized hiatal hernia present.  Stomach otherwise appears within normal limits.  The spleen, pancreas, and adrenal glands are unremarkable.    The kidneys are normal in size and location and enhance symmetrically.  There is no evidence of hydronephrosis. Urinary bladder appears within normal limits.  Uterus is  surgically absent.  There is trace free fluid in the pelvis.    The abdominal aorta is normal in course and caliber without significant atherosclerotic calcifications.    The visualized loops of small and large bowel show no evidence of obstruction or inflammation.  Appendix appears to be surgically absent.  There is no free intraperitoneal air or portal venous gas.  There are a few scattered shotty mesenteric and retroperitoneal lymph nodes, similar to prior examinations.    There is postoperative change of the lumbar spine at L5-S1. The extraperitoneal soft tissues are unremarkable.                                       Medications   ondansetron injection 8 mg (has no administration in time range)   famotidine (PF) injection 20 mg (has no administration in time range)   sodium chloride 0.9% bolus 1,000 mL 1,000 mL (0 mLs Intravenous Stopped 1/27/24 0000)   ketorolac injection 15 mg (15 mg Intravenous Given 1/26/24 2255)   ondansetron injection 4 mg (4 mg Intravenous Given 1/26/24 2255)   iohexoL (OMNIPAQUE 350) injection 100 mL (100 mLs Intravenous Given 1/27/24 0053)     Medical Decision Making  1:14 a.m.    Patient feels much better, no nausea or vomiting.  Repeat abdominal exam is soft nontender bowel sounds positive.    Patient workup done including CT of the abdomen and pelvis.  CT of the abdomen and pelvis shows moderate size hiatal hernia.  Will start patient on Protonix 40 mg p.o. q.d., also give Zofran as needed.    I discussed with patient and mother at length we will discharge patient home and she will follow up with PCP she is aware if any worsening symptoms she will return to ER promptly.    Amount and/or Complexity of Data Reviewed  Labs: ordered.  Radiology: ordered.    Risk  Prescription drug management.                                      Clinical Impression:  Final diagnoses:  [R10.9] Abdominal pain  [R11.2] Nausea and vomiting, unspecified vomiting type (Primary)  [R10.9] Abdominal pain,  unspecified abdominal location  [K44.9] Hiatal hernia                 Ashley Gross MD  01/27/24 0115

## 2024-01-27 NOTE — DISCHARGE INSTRUCTIONS
Observe closely, take medication as prescribed.  If any worsening symptoms please return to ER promptly.

## 2024-01-31 ENCOUNTER — PATIENT OUTREACH (OUTPATIENT)
Dept: EMERGENCY MEDICINE | Facility: HOSPITAL | Age: 44
End: 2024-01-31
Payer: COMMERCIAL

## 2024-01-31 NOTE — PROGRESS NOTES
Yu Spence Patient Care Assistant  ED Navigator  Emergency Department    Project: [facility] [place] [facility] ED Navigator  Role: Community Health Worker    Date: 01/31/2024  Patient Name: Epifanio Grubbs  MRN: [id number]  PCP: Trinity Rangel NP    Assessment:     Epifanio Grubbs is a 43 y.o. female who has presented to ED for abdominal pain. Patient has visited the ED 2 times in the past 3 months. Patient did not contact PCP.     ED Navigator Initial Assessment    ED Navigator Enrollment Documentation  Consent to Services  Does patient consent to completing the assessment?: Yes  Contact  Method of Initial Contact: Phone  Transportation  Does the patient have issues with Transportation?: No  Does the patient have transportation to and from healthcare appointments?: Yes  Insurance Coverage  Do you have coverage/adequate coverage?: Yes  Type/kind of coverage: [employer] [place]  Is patient able to afford co-pays/deductibles?: Yes  Is patient able to afford HME or supplies?: Yes  Does patient have an established [facility] PCP?: Yes  Able to access?: Yes  Does the patient have a lack of adequate coverage?: No  Specialist Appointment  Did the patient come to the ED to see a specialist?: No  Does the patient have a pending specialist referral?: No  Does the patient have a specialist appointment made?: Yes  Is the patient able to access a timely specialist appointment?: Yes  PCP Follow Up Appointment  Has the patient had an appointment with a primary care provider in the past year?: Yes  Approximate date: 1/17/24  Provider: Trinity Rangel NP  Does the patient have a follow up appontment with a PCP?: Yes  Upcoming appointment date: 1/22/25  Provider: Trinity Rangel NP  When was the last time you saw your PCP?: 1/17/24  Medications  Is patient able to afford medication?: Yes  Is patient unable to get medication due to lack of transportation?: No  Psychological  Does the patient have psycho-social concerns?: No  Food  Does the patient  have concerns about food?: No  Communication/Education  Does the patient have limited English proficiency/English not primary language?: No  Does patient have low literacy and/or low health literacy?: No  Does patient have concerns with care?: No  Other Financial Concerns  Does the patient have immediate financial distress?: No  Does the patient have general financial concerns?: Yes  Other Social Barriers/Concerns  Does the patient have any additional barriers or concerns?: Unable to afford utilities  Primary Barrier  Barriers identified: Structural barrier (service availability, waiting times, etc.)  Root Cause of ED Utilization: Chronic Conditions  Plan to address Chronic Conditions: Schedule appointment for patient with their PCP/specialist per ED discharge instructions, Remind patient of upcoming PCP/specialist appointment within 24 to 48 hours before scheduled appt (Comment: Sent message to GI clinic for pt.)  Next steps: Provided Education  Was education/educational materials provided surrounding PCP services/creating a medical home?: Yes Was education verbal or written?: Verbal, Written     Was education/educational materials provided surrounding low cost, healthy foods?: Yes Was education verbal or written?: Written     Was education/educational materials provided surrounding other items? If so, use comment to explain.: Yes Was education verbal or written?: Written   Plan: Utility payment assistance resource given for their region  Expected Date of Follow Up 1: 2/12/24  Additional Documentation: Pt expressed she is doing a little better and the only appointment she would like to schedule is with gastroenterology. Pt understands a message will be sent and she will be contacted. Pt additionally could use help with utilities. Pt confirmed [email or web address] is still her e-mail address. Pt is agreeable to a follow-up call soon.    ED navigator ensured to assist pt with her needs. ED navigator sent the  following to [facility]'s staff: Good afternoon, pt was referred on 1/17 by PCP, and also went to ER for abdominal pain recently. Pt would like to schedule an appointment and can be reached at [phone]. Thanks in advance! ED navigator provided patient with the following via e-mail: utility assistance resources, the [employer] On Call 24/7 Nurse triage line, [phone] or 1-866-[facility] ([phone]) contact information, and education on (The Right Care at the Right Level information, [facility] Virtual Visit information, and Heart healthy diet tips). ED navigator will follow-up with patient on/around 2/12/2024 to see how she is doing, ensure she received resources, and to assist as needed.    Yu Spence  ED Navigator- [facility]  [phone]            Social History     Socioeconomic History    Marital status:    Tobacco Use    Smoking status: Never    Smokeless tobacco: Never   Substance and Sexual Activity    Alcohol use: No     Alcohol/week: 0.0 standard drinks of alcohol    Drug use: No    Sexual activity: Yes     Partners: Male     Birth control/protection: Surgical     Social Determinants of Health     Financial Resource Strain: Medium Risk (1/31/2024)    Overall Financial Resource Strain (CARDIA)     Difficulty of Paying Living Expenses: Somewhat hard   Food Insecurity: No Food Insecurity (1/31/2024)    Hunger Vital Sign     Worried About Running Out of Food in the Last Year: Never true     Ran Out of Food in the Last Year: Never true   Transportation Needs: Unknown (1/31/2024)    PRAPARE - Transportation     Lack of Transportation (Medical): No   Physical Activity: Insufficiently Active (1/31/2024)    Exercise Vital Sign     Days of Exercise per Week: 2 days     Minutes of Exercise per Session: 30 min   Stress: No Stress Concern Present (1/31/2024)    [place] [employer] - Occupational Stress Questionnaire     Feeling of Stress : Only a little    Social Connections: Socially Integrated (1/31/2024)    Social Connection and Isolation Panel [NHANES]     Frequency of Communication with Friends and Family: More than three times a week     Frequency of Social Gatherings with Friends and Family: More than three times a week     Attends Rastafarian Services: More than 4 times per year     Active Member of Clubs or Organizations: Yes     Attends Club or Organization Meetings: More than 4 times per year     Marital Status:    Housing Stability: Low Risk  (1/31/2024)    Housing Stability Vital Sign     Unable to Pay for Housing in the Last Year: No     Number of Places Lived in the Last Year: 1     Unstable Housing in the Last Year: No       Plan:     Pt expressed she is doing a little better and the only appointment she would like to schedule is with gastroenterology. Pt understands a message will be sent and she will be contacted. Pt additionally could use help with utilities. Pt confirmed [email or web address] is still her e-mail address. Pt is agreeable to a follow-up call soon.    ED navigator ensured to assist pt with her needs. ED navigator sent the following to [facility]'s staff: Good afternoon, pt was referred on 1/17 by PCP, and also went to ER for abdominal pain recently. Pt would like to schedule an appointment and can be reached at [phone]. Thanks in advance! ED navigator provided patient with the following via e-mail: utility assistance resources, the [facility] On Call 24/7 Nurse triage line, [phone] or 1-866-[facility] ([phone]) contact information, and education on (The Right Care at the Right Level information, [facility] Virtual Visit information, and Heart healthy diet tips). ED navigator will follow-up with patient on/around 2/12/2024 to see how she is doing, ensure she received resources, and to assist as needed.    Yu Spence  ED Navigator- [place]  [phone]

## 2024-02-12 ENCOUNTER — PATIENT OUTREACH (OUTPATIENT)
Dept: EMERGENCY MEDICINE | Facility: HOSPITAL | Age: 44
End: 2024-02-12
Payer: COMMERCIAL

## 2024-02-12 NOTE — PROGRESS NOTES
Pt is unreachable for 1st F/U. ED navigator will follow-up with patient on/around 3/6/2024 for attempt at 2nd.    Yu Spence  ED Navigator- [place]  [phone]

## 2024-03-06 ENCOUNTER — PATIENT OUTREACH (OUTPATIENT)
Dept: EMERGENCY MEDICINE | Facility: HOSPITAL | Age: 44
End: 2024-03-06
Payer: COMMERCIAL

## 2024-03-06 NOTE — PROGRESS NOTES
Pt is unreachable for 2nd F/U. ED navigator will follow-up with patient on/around 4/12/2024 for attempt at 3rd.    Yu Spence  ED Navigator  [phone]

## 2024-04-12 ENCOUNTER — PATIENT OUTREACH (OUTPATIENT)
Dept: EMERGENCY MEDICINE | Facility: HOSPITAL | Age: 44
End: 2024-04-12
Payer: COMMERCIAL

## 2024-04-19 NOTE — PROGRESS NOTES
Pt is unreachable for 3rd F/U. ED navigator will follow-up with patient on/around 6/5/2024 for attempt at additional.     Yu Spence  ED Navigator  [phone]

## 2024-06-05 ENCOUNTER — PATIENT OUTREACH (OUTPATIENT)
Dept: EMERGENCY MEDICINE | Facility: HOSPITAL | Age: 44
End: 2024-06-05
Payer: COMMERCIAL

## 2024-06-05 NOTE — PROGRESS NOTES
ED navigator reminded patient about her appointment for Friday with Trent García MD at 9:00 a.m. through voicemail, as she did not answer the call. ED navigator asked patient to return the call, so she can know she did receive the message.    Yu Spence  ED Navigator  [phone]

## 2024-06-05 NOTE — PROGRESS NOTES
Pt has been unreachable. Encounter will be closed.    Yu Spence  ED Navigator  [phone]

## 2024-06-06 ENCOUNTER — HOSPITAL ENCOUNTER (EMERGENCY)
Facility: HOSPITAL | Age: 44
Discharge: HOME | End: 2024-06-06
Attending: EMERGENCY MEDICINE
Payer: COMMERCIAL

## 2024-06-06 VITALS
DIASTOLIC BLOOD PRESSURE: 95 MMHG | RESPIRATION RATE: 18 BRPM | HEART RATE: 89 BPM | WEIGHT: 212.75 LBS | HEIGHT: 65 IN | BODY MASS INDEX: 35.45 KG/M2 | TEMPERATURE: 99 F | OXYGEN SATURATION: 100 % | SYSTOLIC BLOOD PRESSURE: 181 MMHG

## 2024-06-06 DIAGNOSIS — J20.9 ACUTE BRONCHITIS, UNSPECIFIED ORGANISM: ICD-10-CM

## 2024-06-06 DIAGNOSIS — J06.9 VIRAL URI WITH COUGH: Primary | ICD-10-CM

## 2024-06-06 DIAGNOSIS — J01.90 ACUTE SINUSITIS, RECURRENCE NOT SPECIFIED, UNSPECIFIED LOCATION: ICD-10-CM

## 2024-06-06 LAB
GROUP A STREP, MOLECULAR: NEGATIVE
INFLUENZA A, MOLECULAR: NEGATIVE
INFLUENZA B, MOLECULAR: NEGATIVE
SARS-COV-2 RDRP RESP QL NAA+PROBE: NEGATIVE
SPECIMEN SOURCE: NORMAL

## 2024-06-06 PROCEDURE — 99284 EMERGENCY DEPT VISIT MOD MDM: CPT | Mod: 25

## 2024-06-06 PROCEDURE — 96372 THER/PROPH/DIAG INJ SC/IM: CPT | Performed by: NURSE PRACTITIONER

## 2024-06-06 PROCEDURE — 87502 INFLUENZA DNA AMP PROBE: CPT | Performed by: EMERGENCY MEDICINE

## 2024-06-06 PROCEDURE — 87651 STREP A DNA AMP PROBE: CPT | Performed by: EMERGENCY MEDICINE

## 2024-06-06 PROCEDURE — 63600175 PHARM REV CODE 636 W HCPCS: Performed by: NURSE PRACTITIONER

## 2024-06-06 PROCEDURE — U0002 COVID-19 LAB TEST NON-CDC: HCPCS | Performed by: EMERGENCY MEDICINE

## 2024-06-06 RX ORDER — CETIRIZINE HYDROCHLORIDE 10 MG/1
10 TABLET ORAL DAILY
Qty: 30 TABLET | Refills: 0 | Status: SHIPPED | OUTPATIENT
Start: 2024-06-06 | End: 2025-06-06

## 2024-06-06 RX ORDER — DEXAMETHASONE SODIUM PHOSPHATE 4 MG/ML
8 INJECTION, SOLUTION INTRA-ARTICULAR; INTRALESIONAL; INTRAMUSCULAR; INTRAVENOUS; SOFT TISSUE
Status: COMPLETED | OUTPATIENT
Start: 2024-06-06 | End: 2024-06-06

## 2024-06-06 RX ORDER — FLUTICASONE PROPIONATE 50 MCG
1 SPRAY, SUSPENSION (ML) NASAL DAILY PRN
Qty: 15 G | Refills: 0 | Status: SHIPPED | OUTPATIENT
Start: 2024-06-06 | End: 2024-06-13

## 2024-06-06 RX ORDER — PROMETHAZINE HYDROCHLORIDE AND DEXTROMETHORPHAN HYDROBROMIDE 6.25; 15 MG/5ML; MG/5ML
5 SYRUP ORAL EVERY 6 HOURS PRN
Qty: 100 ML | Refills: 0 | Status: SHIPPED | OUTPATIENT
Start: 2024-06-06

## 2024-06-06 RX ADMIN — DEXAMETHASONE SODIUM PHOSPHATE 8 MG: 4 INJECTION, SOLUTION INTRA-ARTICULAR; INTRALESIONAL; INTRAMUSCULAR; INTRAVENOUS; SOFT TISSUE at 09:06

## 2024-06-07 NOTE — ED PROVIDER NOTES
Encounter Date: 6/6/2024       History     Chief Complaint   Patient presents with    URI     Pt to ED with c/o headache, body aches and chills, sore throat, cough and congestion that began 2 days ago. Denies fevers at home.      Epifanio Grubbs is a 43 y.o. female  with PMH of fibromyalgia, GERD, osteoarthritis presenting to the ED for evaluation of URI symptoms.  Patient presents with a two-day history of generalized headache, body aches, sore throat, cough, and congestion.  Cough is loose and nonproductive with no associated chest pain or shortness of breath.  Denies fever or sick contacts at home.    The history is provided by the patient.     Review of patient's allergies indicates:  No Known Allergies  Past Medical History:   Diagnosis Date    Bulging lumbar disc 2015    Chronic pelvic pain in female     Delayed gastric emptying     Fibromyalgia     GERD (gastroesophageal reflux disease)     Lumbosacral radiculopathy     Osteoarthritis      Past Surgical History:   Procedure Laterality Date    ADENOIDECTOMY      APPENDECTOMY      laparoscopic    BACK SURGERY      infusion/lift    co 2 laser      to cervix about 5 years ago    COLONOSCOPY N/A 12/21/2016    Procedure: COLONOSCOPY;  Surgeon: Aylin García MD;  Location: [facility];  Service: Endoscopy;  Laterality: N/A;    COLPOSCOPY      HYSTERECTOMY      LAPAROSCOPIC SALPINGO-OOPHORECTOMY Bilateral 08/23/2022    Procedure: SALPINGO-OOPHORECTOMY, LAPAROSCOPIC;  Surgeon: Izaiah Murphy MD;  Location: [facility] OR;  Service: OB/GYN;  Laterality: Bilateral;    LAPAROSCOPIC TOTAL HYSTERECTOMY N/A 08/23/2022    Procedure: HYSTERECTOMY, TOTAL, LAPAROSCOPIC;  Surgeon: Izaiah Murphy MD;  Location: [facility] OR;  Service: OB/GYN;  Laterality: N/A;    PELVIC LAPAROSCOPY  08/11/2014    normal    TONSILLECTOMY      TUBAL LIGATION      UPPER GASTROINTESTINAL ENDOSCOPY       Family History   Problem Relation Name Age of Onset    Lupus Mother      Hypertension  Father       Social History     Tobacco Use    Smoking status: Never    Smokeless tobacco: Never   Substance Use Topics    Alcohol use: No     Alcohol/week: 0.0 standard drinks of alcohol    Drug use: No     Review of Systems   Constitutional:  Positive for chills. Negative for activity change and fever.   HENT:  Positive for congestion and sore throat. Negative for ear discharge, ear pain, postnasal drip, sinus pressure and sinus pain.    Respiratory:  Positive for cough. Negative for chest tightness and shortness of breath.    Cardiovascular:  Negative for chest pain.   Gastrointestinal:  Negative for abdominal distention, abdominal pain and nausea.   Genitourinary:  Negative for dysuria, frequency and urgency.   Musculoskeletal:  Positive for myalgias. Negative for back pain.   Skin:  Negative for rash.   Neurological:  Positive for headaches. Negative for dizziness, weakness, light-headedness and numbness.   Hematological:  Does not bruise/bleed easily.       Physical Exam     Initial Vitals [06/06/24 2021]   BP Pulse Resp Temp SpO2   (!) 181/95 89 18 98.5 °F (36.9 °C) 100 %      MAP       --         Physical Exam    Nursing note and vitals reviewed.  Constitutional: She appears well-developed and well-nourished.   HENT:   Head: Normocephalic and atraumatic.   Nose: Mucosal edema and rhinorrhea present. Right sinus exhibits maxillary sinus tenderness and frontal sinus tenderness. Left sinus exhibits maxillary sinus tenderness and frontal sinus tenderness.   Eyes: Conjunctivae and EOM are normal. Pupils are equal, round, and reactive to light.   Neck: Neck supple.   Cardiovascular:  Normal rate, regular rhythm, normal heart sounds and intact distal pulses.           Pulmonary/Chest: Breath sounds normal.   Abdominal: Abdomen is soft. Bowel sounds are normal.   Musculoskeletal:         General: Normal range of motion.      Cervical back: Neck supple.     Neurological: She is alert and oriented to person, place,  and time. She has normal strength.   Skin: Skin is warm and dry.   Psychiatric: She has a normal mood and affect. Her behavior is normal. Judgment and thought content normal.         ED Course   Procedures  Labs Reviewed   INFLUENZA A & B BY MOLECULAR   GROUP A STREP, MOLECULAR   SARS-COV-2 RNA AMPLIFICATION, QUAL          Imaging Results    None          Medications   dexAMETHasone injection 8 mg (has no administration in time range)     Medical Decision Making    Evaluation of a 43-year-old female with cough and cold symptoms for the past 2-3 days.  She presents with stable vital signs.  She does have mucosal edema with frontal and maxillary sinus tenderness with palpation.  Breath sounds are clear.  Differential diagnosis includes flu, strep, COVID, sinusitis, bronchitis, viral URI    Amount and/or Complexity of Data Reviewed  Labs: ordered. Decision-making details documented in ED Course.    Risk  OTC drugs.  Prescription drug management.  Risk Details:  Stable for discharge home.  Patient given a Decadron injection in the ED for  symptom control.  Will discharge home with symptomatic treatment for likely viral illness. Patient/caregiver voices understanding and feels comfortable with discharge plan.      The patient acknowledges that close follow up with medical provider is required. Instructed to follow up with PCP within 2 days. Patient was given specific return precautions. The patient agrees to comply with all instruction and directions given in the ER.                                        Clinical Impression:  Final diagnoses:  [J06.9] Viral URI with cough (Primary)  [J20.9] Acute bronchitis, unspecified organism  [J01.90] Acute sinusitis, recurrence not specified, unspecified location          ED Disposition Condition    Discharge Stable          ED Prescriptions       Medication Sig Dispense Start Date End Date Auth. Provider    promethazine-dextromethorphan (PROMETHAZINE-DM) 6.25-15 mg/5 mL Syrp Take  5 mLs by mouth every 6 (six) hours as needed (cough). 100 mL 6/6/2024 -- Kandy Teague NP    fluticasone propionate (FLONASE) 50 mcg/actuation nasal spray 1 spray (50 mcg total) by Each Nostril route daily as needed for Rhinitis or Allergies. 15 g 6/6/2024 6/13/2024 Kandy Teague NP    cetirizine (ZYRTEC) 10 MG tablet Take 1 tablet (10 mg total) by mouth once daily. 30 tablet 6/6/2024 6/6/2025 Kandy Teague NP          Follow-up Information       Follow up With Specialties Details Why Contact Info    Trinity Rangel NP Family Medicine Schedule an appointment as soon as possible for a visit in 2 days  [address] [facility] [address]  [phone]               Kandy Teague NP  06/06/24 0457

## 2024-09-06 ENCOUNTER — HOSPITAL ENCOUNTER (EMERGENCY)
Facility: HOSPITAL | Age: 44
Discharge: HOME | End: 2024-09-06
Payer: COMMERCIAL

## 2024-09-06 VITALS
HEART RATE: 102 BPM | RESPIRATION RATE: 17 BRPM | HEIGHT: 65 IN | SYSTOLIC BLOOD PRESSURE: 132 MMHG | WEIGHT: 215.75 LBS | DIASTOLIC BLOOD PRESSURE: 81 MMHG | OXYGEN SATURATION: 100 % | TEMPERATURE: 98 F | BODY MASS INDEX: 35.94 KG/M2

## 2024-09-06 DIAGNOSIS — J45.901 EXACERBATION OF ASTHMA, UNSPECIFIED ASTHMA SEVERITY, UNSPECIFIED WHETHER PERSISTENT: Primary | ICD-10-CM

## 2024-09-06 DIAGNOSIS — R06.02 SHORTNESS OF BREATH: ICD-10-CM

## 2024-09-06 LAB
INFLUENZA A, MOLECULAR: NEGATIVE
INFLUENZA B, MOLECULAR: NEGATIVE
SARS-COV-2 RDRP RESP QL NAA+PROBE: NEGATIVE
SPECIMEN SOURCE: NORMAL

## 2024-09-06 PROCEDURE — 93005 ELECTROCARDIOGRAM TRACING: CPT

## 2024-09-06 PROCEDURE — 99285 EMERGENCY DEPT VISIT HI MDM: CPT | Mod: 25

## 2024-09-06 PROCEDURE — 87502 INFLUENZA DNA AMP PROBE: CPT

## 2024-09-06 PROCEDURE — 94640 AIRWAY INHALATION TREATMENT: CPT | Mod: XB

## 2024-09-06 PROCEDURE — 25000003 PHARM REV CODE 250

## 2024-09-06 PROCEDURE — 63600175 PHARM REV CODE 636 W HCPCS

## 2024-09-06 PROCEDURE — U0002 COVID-19 LAB TEST NON-CDC: HCPCS

## 2024-09-06 PROCEDURE — 94761 N-INVAS EAR/PLS OXIMETRY MLT: CPT

## 2024-09-06 PROCEDURE — 25000242 PHARM REV CODE 250 ALT 637 W/ HCPCS

## 2024-09-06 PROCEDURE — 96372 THER/PROPH/DIAG INJ SC/IM: CPT

## 2024-09-06 PROCEDURE — 93010 ELECTROCARDIOGRAM REPORT: CPT | Mod: ,,, | Performed by: INTERNAL MEDICINE

## 2024-09-06 RX ORDER — ALBUTEROL SULFATE 90 UG/1
2 INHALANT RESPIRATORY (INHALATION) EVERY 6 HOURS PRN
Qty: 18 G | Refills: 0 | Status: SHIPPED | OUTPATIENT
Start: 2024-09-06 | End: 2025-09-06

## 2024-09-06 RX ORDER — METHYLPREDNISOLONE SOD SUCC 125 MG
125 VIAL (EA) INJECTION
Status: DISCONTINUED | OUTPATIENT
Start: 2024-09-06 | End: 2024-09-06

## 2024-09-06 RX ORDER — ALBUTEROL SULFATE 0.83 MG/ML
2.5 SOLUTION RESPIRATORY (INHALATION) EVERY 6 HOURS PRN
Qty: 30 EACH | Refills: 1 | Status: SHIPPED | OUTPATIENT
Start: 2024-09-06 | End: 2024-10-06

## 2024-09-06 RX ORDER — IPRATROPIUM BROMIDE AND ALBUTEROL SULFATE 2.5; .5 MG/3ML; MG/3ML
3 SOLUTION RESPIRATORY (INHALATION)
Status: COMPLETED | OUTPATIENT
Start: 2024-09-06 | End: 2024-09-06

## 2024-09-06 RX ORDER — BENZONATATE 100 MG/1
200 CAPSULE ORAL
Status: COMPLETED | OUTPATIENT
Start: 2024-09-06 | End: 2024-09-06

## 2024-09-06 RX ORDER — PREDNISONE 20 MG/1
40 TABLET ORAL DAILY
Qty: 10 TABLET | Refills: 0 | Status: SHIPPED | OUTPATIENT
Start: 2024-09-06 | End: 2024-09-11

## 2024-09-06 RX ORDER — METHYLPREDNISOLONE SOD SUCC 125 MG
125 VIAL (EA) INJECTION
Status: COMPLETED | OUTPATIENT
Start: 2024-09-06 | End: 2024-09-06

## 2024-09-06 RX ADMIN — IPRATROPIUM BROMIDE AND ALBUTEROL SULFATE 3 ML: 2.5; .5 SOLUTION RESPIRATORY (INHALATION) at 07:09

## 2024-09-06 RX ADMIN — IPRATROPIUM BROMIDE AND ALBUTEROL SULFATE 3 ML: 2.5; .5 SOLUTION RESPIRATORY (INHALATION) at 09:09

## 2024-09-06 RX ADMIN — METHYLPREDNISOLONE SODIUM SUCCINATE 125 MG: 125 INJECTION, POWDER, FOR SOLUTION INTRAMUSCULAR; INTRAVENOUS at 07:09

## 2024-09-06 RX ADMIN — BENZONATATE 200 MG: 100 CAPSULE ORAL at 07:09

## 2024-09-06 NOTE — ED PROVIDER NOTES
Encounter Date: 9/6/2024    Source of History:   Patient and medical record, without language barrier or      Chief complaint:  Cough and Shortness of Breath (Patient reports nonproductive cough, shortness of breath, and irritated throat - onset yesterday.  Denies any sick contacts.  No fever.  No nasal congestion/drainage.)    HPI:  Epifanio Grubbs is a 43 y.o. female with asthma presenting with chief complaint of shortness of breath and cough.  Patient states starting yesterday she developed a nonproductive cough with associated shortness of breath and sore throat.  She has not been exposed to anyone sick.  She feels that her asthma is acting up and has been using her rescue inhaler which has not been helping as much as it should.  She denies congestion, rhinorrhea, fever, chest pain    This is the extent to the patients complaints today here in the emergency department.    ROS: A review of systems was conducted with pertinent positive and negative findings documented in HPI with all other systems reviewed and negative.  ROS    Review of patient's allergies indicates:  No Known Allergies    PMH:  As per HPI and below:  Past Medical History:   Diagnosis Date    Bulging lumbar disc 2015    Chronic pelvic pain in female     Delayed gastric emptying     Fibromyalgia     GERD (gastroesophageal reflux disease)     Lumbosacral radiculopathy     Osteoarthritis      Past Surgical History:   Procedure Laterality Date    ADENOIDECTOMY      APPENDECTOMY      laparoscopic    BACK SURGERY      infusion/lift    co 2 laser      to cervix about 5 years ago    COLONOSCOPY N/A 12/21/2016    Procedure: COLONOSCOPY;  Surgeon: Aylin García MD;  Location: [facility];  Service: Endoscopy;  Laterality: N/A;    COLPOSCOPY      HYSTERECTOMY      LAPAROSCOPIC SALPINGO-OOPHORECTOMY Bilateral 08/23/2022    Procedure: SALPINGO-OOPHORECTOMY, LAPAROSCOPIC;  Surgeon: Izaiah Murphy MD;  Location: [facility];  Service:  "OB/GYN;  Laterality: Bilateral;    LAPAROSCOPIC TOTAL HYSTERECTOMY N/A 08/23/2022    Procedure: HYSTERECTOMY, TOTAL, LAPAROSCOPIC;  Surgeon: Izaiah Murphy MD;  Location: [facility] OR;  Service: OB/GYN;  Laterality: N/A;    PELVIC LAPAROSCOPY  08/11/2014    normal    TONSILLECTOMY      TUBAL LIGATION      UPPER GASTROINTESTINAL ENDOSCOPY       Social History     Socioeconomic History    Marital status:    Tobacco Use    Smoking status: Never    Smokeless tobacco: Never   Substance and Sexual Activity    Alcohol use: No     Alcohol/week: 0.0 standard drinks of alcohol    Drug use: No    Sexual activity: Yes     Partners: Male     Birth control/protection: Surgical     Social Determinants of Health     Financial Resource Strain: Medium Risk (7/2/2024)    Overall Financial Resource Strain (CARDIA)     Difficulty of Paying Living Expenses: Somewhat hard   Food Insecurity: Food Insecurity Present (7/2/2024)    Hunger Vital Sign     Worried About Running Out of Food in the Last Year: Sometimes true     Ran Out of Food in the Last Year: Sometimes true   Transportation Needs: Unknown (1/31/2024)    PRAPARE - Transportation     Lack of Transportation (Medical): No   Physical Activity: Insufficiently Active (7/2/2024)    Exercise Vital Sign     Days of Exercise per Week: 3 days     Minutes of Exercise per Session: 40 min   Stress: Stress Concern Present (7/2/2024)    [employer] [place] [employer] - Occupational Stress Questionnaire     Feeling of Stress : Very much   Housing Stability: High Risk (7/2/2024)    Housing Stability Vital Sign     Unable to Pay for Housing in the Last Year: Yes     Vitals:    /81   Pulse 102   Temp 97.8 °F (36.6 °C) (Oral)   Resp 17   Ht 5' 5" (1.651 m)   Wt 97.8 kg (215 lb 11.5 oz)   LMP 08/23/2022   SpO2 100%   Breastfeeding No   BMI 35.90 kg/m²     Physical Exam  Vitals and nursing note reviewed.   Constitutional:       General: She is not in acute distress.     " Appearance: Normal appearance. She is not toxic-appearing or diaphoretic.   HENT:      Head: Normocephalic and atraumatic.      Right Ear: External ear normal.      Left Ear: External ear normal.      Mouth/Throat:      Mouth: Mucous membranes are moist.   Eyes:      General: No scleral icterus.     Conjunctiva/sclera: Conjunctivae normal.   Cardiovascular:      Rate and Rhythm: Normal rate and regular rhythm.      Pulses: Normal pulses.      Heart sounds: Normal heart sounds. No murmur heard.     No friction rub. No gallop.   Pulmonary:      Effort: Pulmonary effort is normal. No respiratory distress.      Breath sounds: No stridor. Wheezing (inspiratory and expiratory) present. No rhonchi or rales.   Abdominal:      General: Abdomen is flat. There is no distension.   Musculoskeletal:         General: No swelling or deformity.      Cervical back: Normal range of motion and neck supple.   Skin:     General: Skin is warm and dry.      Coloration: Skin is not jaundiced.      Findings: No rash.   Neurological:      Mental Status: She is alert and oriented to person, place, and time. Mental status is at baseline.   Psychiatric:         Mood and Affect: Mood normal.         Behavior: Behavior normal.       Procedures    Laboratory Studies:  Labs that have been ordered have been independently reviewed and interpreted by myself.  Labs Reviewed   INFLUENZA A & B BY MOLECULAR       Result Value    Influenza A, Molecular Negative      Influenza B, Molecular Negative      Flu A & B Source Nasal swab     SARS-COV-2 RNA AMPLIFICATION, QUAL    SARS-CoV-2 RNA, Amplification, Qual Negative       Imaging Results              X-Ray Chest 1 View (Final result)  Result time 09/06/24 07:35:48      Final result by Alyssia Brower MD (09/06/24 07:35:48)                   Impression:      No acute abnormality.      Electronically signed by: Alyssia Brower MD  Date:    09/06/2024  Time:    07:35               Narrative:     EXAMINATION:  XR CHEST 1 VIEW    CLINICAL HISTORY:  shortness of breath;    TECHNIQUE:  Single frontal view of the chest was performed.    COMPARISON:  12/07/2022    FINDINGS:  The lungs are clear with normal appearance of pulmonary vasculature. No pleural effusion. No evident pneumothorax.    The cardiac silhouette is normal in size. The hilar and mediastinal contours are unremarkable.    Bones are intact.                                      EKG (independently interpreted by me): Rhythm:  NSR, rate of  83 BPM, no ST elevations or depressions, QTc we 444    Imaging (independently interpreted by me):  CXR: No cardiomegaly, pulmonary edema, or pneumothorax    I decided to obtain the patient's medical records.  Summary of Medical Records:    Medications   benzonatate capsule 200 mg (200 mg Oral Given 9/6/24 0719)   albuterol-ipratropium 2.5 mg-0.5 mg/3 mL nebulizer solution 3 mL (3 mLs Nebulization Given 9/6/24 0750)   methylPREDNISolone sodium succinate injection 125 mg (125 mg Intramuscular Given 9/6/24 0720)   albuterol-ipratropium 2.5 mg-0.5 mg/3 mL nebulizer solution 3 mL (3 mLs Nebulization Given 9/6/24 0915)     MDM:    43 y.o. female with cough and shortness of breath    Differential Dx:  Differential includes but is not limited to asthma exacerbation, COVID, flu, pneumonia    ED Management:  Viral swabs and chest x-ray ordered.  Patient has inspiratory and expiratory wheezing at this time, duo nebs x3 and Solu-Medrol ordered.  Tessalon Perles for cough.  On re-evaluation patient has had complete resolution of her wheezing.  We will administer 1 more DuoNeb to ensure her lungs are stay open.  Patient discharged home with refills for her nebulizer, albuterol inhaler refill, and prednisone burst.  Discussed results, diagnosis, and treatment plan with patient; advised close follow-up with PCP. Reviewed strict return precautions. Patient confirms understanding and ability to comply. Patient was given the  opportunity to ask questions prior to discharge and all questions answered.       Medical Decision Making  Amount and/or Complexity of Data Reviewed  Radiology: ordered.    Risk  Prescription drug management.            Diagnostic Impression:    Final diagnoses:  [R06.02] Shortness of breath  [J45.901] Exacerbation of asthma, unspecified asthma severity, unspecified whether persistent (Primary)     ED Disposition Condition    Discharge Stable          ED Prescriptions       Medication Sig Dispense Start Date End Date Auth. Provider    albuterol (PROVENTIL) 2.5 mg /3 mL (0.083 %) nebulizer solution Take 3 mLs (2.5 mg total) by nebulization every 6 (six) hours as needed for Wheezing or Shortness of Breath. Rescue 30 each 9/6/2024 10/6/2024 Kendell Melendrez MD    albuterol (PROVENTIL HFA) 90 mcg/actuation inhaler Inhale 2 puffs into the lungs every 6 (six) hours as needed for Wheezing. Rescue 18 g 9/6/2024 9/6/2025 Kendell Melendrez MD    predniSONE (DELTASONE) 20 MG tablet Take 2 tablets (40 mg total) by mouth once daily. for 5 days 10 tablet 9/6/2024 9/11/2024 Kendell Melendrez MD          Follow-up Information       Follow up With Specialties Details Why Contact Info    Trinity Rangel NP Family Medicine   [address] [facility] [address]  [phone]      [facility] - Emergency Dept Emergency Medicine Go to  As needed, If symptoms worsen [address] [id number] [phone]                Kendell Melendrez MD  09/06/24 [id number]

## 2024-09-06 NOTE — DISCHARGE INSTRUCTIONS
Thank you for coming to our Emergency Department today!     -take albuterol inhaler or nebulizer up to every 4 hours as needed for wheezing and shortness of breath  -take prednisone 40 mg once daily for 5 days  -Follow-up with primary care doctor within 3-7 days to discuss your recent ER visit and any additional concerns that you may have.    Return to the Emergency Department for symptoms including but not limited to: persistence or worsening of symptoms, shortness of breath or chest pain, inability to drink without vomiting, passing out/fainting/ loss of consciousness, or if you have other concerns.

## 2024-09-09 LAB
OHS QRS DURATION: 82 MS
OHS QTC CALCULATION: 444 MS

## 2024-09-16 ENCOUNTER — TELEPHONE (OUTPATIENT)
Dept: NEUROLOGY | Facility: CLINIC | Age: 44
End: 2024-09-16

## 2024-09-16 ENCOUNTER — OFFICE VISIT (OUTPATIENT)
Dept: NEUROLOGY | Facility: CLINIC | Age: 44
End: 2024-09-16
Payer: COMMERCIAL

## 2024-09-16 VITALS
HEART RATE: 92 BPM | DIASTOLIC BLOOD PRESSURE: 94 MMHG | SYSTOLIC BLOOD PRESSURE: 134 MMHG | RESPIRATION RATE: 16 BRPM | HEIGHT: 66 IN | WEIGHT: 219.81 LBS | BODY MASS INDEX: 35.32 KG/M2

## 2024-09-16 DIAGNOSIS — I77.1 SUBCLAVIAN ARTERIAL STENOSIS: ICD-10-CM

## 2024-09-16 DIAGNOSIS — F44.4 FUNCTIONAL NEUROLOGICAL SYMPTOM DISORDER (CONVERSION DISORDER), WITH ABNORMAL MOVEMENT: Primary | ICD-10-CM

## 2024-09-16 DIAGNOSIS — F41.0 PANIC ATTACKS: ICD-10-CM

## 2024-09-16 PROCEDURE — 99999 PR PBB SHADOW E&M-EST. PATIENT-LVL IV: CPT | Mod: PBBFAC,,, | Performed by: PSYCHIATRY & NEUROLOGY

## 2024-09-16 PROCEDURE — 4010F ACE/ARB THERAPY RXD/TAKEN: CPT | Mod: CPTII,S$GLB,, | Performed by: PSYCHIATRY & NEUROLOGY

## 2024-09-16 PROCEDURE — 99214 OFFICE O/P EST MOD 30 MIN: CPT | Mod: S$GLB,,, | Performed by: PSYCHIATRY & NEUROLOGY

## 2024-09-16 PROCEDURE — 3075F SYST BP GE 130 - 139MM HG: CPT | Mod: CPTII,S$GLB,, | Performed by: PSYCHIATRY & NEUROLOGY

## 2024-09-16 PROCEDURE — 3044F HG A1C LEVEL LT 7.0%: CPT | Mod: CPTII,S$GLB,, | Performed by: PSYCHIATRY & NEUROLOGY

## 2024-09-16 PROCEDURE — 3008F BODY MASS INDEX DOCD: CPT | Mod: CPTII,S$GLB,, | Performed by: PSYCHIATRY & NEUROLOGY

## 2024-09-16 PROCEDURE — 3080F DIAST BP >= 90 MM HG: CPT | Mod: CPTII,S$GLB,, | Performed by: PSYCHIATRY & NEUROLOGY

## 2024-09-16 PROCEDURE — 1159F MED LIST DOCD IN RCRD: CPT | Mod: CPTII,S$GLB,, | Performed by: PSYCHIATRY & NEUROLOGY
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
## 2024-09-16 NOTE — TELEPHONE ENCOUNTER
In-basket referral message sent to [facility] to contact patient to schedule appointment.

## 2024-10-01 DIAGNOSIS — I10 PRIMARY HYPERTENSION: ICD-10-CM

## 2024-10-01 DIAGNOSIS — R00.2 PALPITATIONS: ICD-10-CM

## 2024-10-01 RX ORDER — LOSARTAN POTASSIUM 100 MG/1
100 TABLET ORAL
Qty: 90 TABLET | Refills: 3 | Status: SHIPPED | OUTPATIENT
Start: 2024-10-01

## 2024-10-01 RX ORDER — METOPROLOL SUCCINATE 25 MG/1
25 TABLET, EXTENDED RELEASE ORAL
Qty: 90 TABLET | Refills: 3 | Status: SHIPPED | OUTPATIENT
Start: 2024-10-01

## 2025-06-10 ENCOUNTER — PATIENT MESSAGE (OUTPATIENT)
Dept: ADMINISTRATIVE | Facility: HOSPITAL | Age: 45
End: 2025-06-10
Payer: COMMERCIAL

## 2025-09-04 ENCOUNTER — PATIENT OUTREACH (OUTPATIENT)
Dept: ADMINISTRATIVE | Facility: HOSPITAL | Age: 45
End: 2025-09-04
Payer: COMMERCIAL

## 2025-09-05 ENCOUNTER — PATIENT OUTREACH (OUTPATIENT)
Dept: ADMINISTRATIVE | Facility: HOSPITAL | Age: 45
End: 2025-09-05
Payer: COMMERCIAL

## 2025-09-05 VITALS — SYSTOLIC BLOOD PRESSURE: 123 MMHG | DIASTOLIC BLOOD PRESSURE: 56 MMHG
